# Patient Record
Sex: MALE | Race: WHITE | NOT HISPANIC OR LATINO | Employment: OTHER | URBAN - METROPOLITAN AREA
[De-identification: names, ages, dates, MRNs, and addresses within clinical notes are randomized per-mention and may not be internally consistent; named-entity substitution may affect disease eponyms.]

---

## 2019-02-18 ENCOUNTER — OFFICE VISIT CONVERTED (OUTPATIENT)
Dept: ORTHOPEDIC SURGERY | Facility: CLINIC | Age: 39
End: 2019-02-18
Attending: PHYSICIAN ASSISTANT

## 2019-04-01 ENCOUNTER — OFFICE VISIT CONVERTED (OUTPATIENT)
Dept: NEUROSURGERY | Facility: CLINIC | Age: 39
End: 2019-04-01
Attending: PHYSICIAN ASSISTANT

## 2019-04-01 ENCOUNTER — CONVERSION ENCOUNTER (OUTPATIENT)
Dept: NEUROLOGY | Facility: CLINIC | Age: 39
End: 2019-04-01

## 2019-04-08 ENCOUNTER — HOSPITAL ENCOUNTER (OUTPATIENT)
Dept: CARDIOLOGY | Facility: HOSPITAL | Age: 39
Discharge: HOME | End: 2019-04-08
Attending: PHYSICIAN ASSISTANT

## 2019-08-14 ENCOUNTER — APPOINTMENT (OUTPATIENT)
Dept: GENERAL RADIOLOGY | Facility: HOSPITAL | Age: 39
End: 2019-08-14

## 2019-08-14 ENCOUNTER — HOSPITAL ENCOUNTER (EMERGENCY)
Facility: HOSPITAL | Age: 39
Discharge: HOME | End: 2019-08-14
Admitting: EMERGENCY MEDICINE

## 2019-08-14 VITALS
RESPIRATION RATE: 16 BRPM | OXYGEN SATURATION: 99 % | TEMPERATURE: 98.4 F | BODY MASS INDEX: 25.86 KG/M2 | WEIGHT: 174.6 LBS | DIASTOLIC BLOOD PRESSURE: 102 MMHG | SYSTOLIC BLOOD PRESSURE: 148 MMHG | HEART RATE: 73 BPM | HEIGHT: 69 IN

## 2019-08-14 DIAGNOSIS — T67.5XXA HEAT EXHAUSTION, INITIAL ENCOUNTER: Primary | ICD-10-CM

## 2019-08-14 LAB
ALBUMIN SERPL-MCNC: 4 G/DL (ref 3.5–4.8)
ALBUMIN/GLOB SERPL: 1.1 G/DL (ref 1–1.7)
ALP SERPL-CCNC: 71 U/L (ref 32–91)
ALT SERPL W P-5'-P-CCNC: 30 U/L (ref 17–63)
AMPHET+METHAMPHET UR QL: NEGATIVE
ANION GAP SERPL CALCULATED.3IONS-SCNC: 17.7 MMOL/L (ref 5–15)
AST SERPL-CCNC: 28 U/L (ref 15–41)
BARBITURATES UR QL SCN: NEGATIVE
BASOPHILS # BLD AUTO: 0.1 10*3/MM3 (ref 0–0.2)
BASOPHILS NFR BLD AUTO: 1 % (ref 0–1.5)
BENZODIAZ UR QL SCN: NEGATIVE
BILIRUB SERPL-MCNC: 0.8 MG/DL (ref 0.3–1.2)
BILIRUB UR QL STRIP: NEGATIVE
BUN BLD-MCNC: 7 MG/DL (ref 8–20)
BUN/CREAT SERPL: 7 (ref 6.2–20.3)
CALCIUM SPEC-SCNC: 9.3 MG/DL (ref 8.9–10.3)
CANNABINOIDS SERPL QL: NEGATIVE
CHLORIDE SERPL-SCNC: 105 MMOL/L (ref 101–111)
CK SERPL-CCNC: 94 U/L (ref 20–200)
CLARITY UR: CLEAR
CO2 SERPL-SCNC: 19 MMOL/L (ref 22–32)
COCAINE UR QL: NEGATIVE
COLOR UR: ABNORMAL
CREAT BLD-MCNC: 1 MG/DL (ref 0.7–1.2)
CREAT UR-MCNC: 228.6 MG/DL
DEPRECATED RDW RBC AUTO: 44.2 FL (ref 37–54)
EOSINOPHIL # BLD AUTO: 0.1 10*3/MM3 (ref 0–0.4)
EOSINOPHIL NFR BLD AUTO: 0.8 % (ref 0.3–6.2)
ERYTHROCYTE [DISTWIDTH] IN BLOOD BY AUTOMATED COUNT: 13.9 % (ref 12.3–15.4)
GFR SERPL CREATININE-BSD FRML MDRD: 84 ML/MIN/1.73
GLOBULIN UR ELPH-MCNC: 3.5 GM/DL (ref 2.5–3.8)
GLUCOSE BLD-MCNC: 108 MG/DL (ref 65–99)
GLUCOSE BLDC GLUCOMTR-MCNC: 140 MG/DL (ref 70–105)
GLUCOSE UR STRIP-MCNC: NEGATIVE MG/DL
HCT VFR BLD AUTO: 44 % (ref 37.5–51)
HGB BLD-MCNC: 15.6 G/DL (ref 13–17.7)
HGB UR QL STRIP.AUTO: NEGATIVE
KETONES UR QL STRIP: NEGATIVE
LEUKOCYTE ESTERASE UR QL STRIP.AUTO: NEGATIVE
LYMPHOCYTES # BLD AUTO: 2.9 10*3/MM3 (ref 0.7–3.1)
LYMPHOCYTES NFR BLD AUTO: 24.2 % (ref 19.6–45.3)
MCH RBC QN AUTO: 32.1 PG (ref 26.6–33)
MCHC RBC AUTO-ENTMCNC: 35.4 G/DL (ref 31.5–35.7)
MCV RBC AUTO: 90.5 FL (ref 79–97)
METHADONE UR QL SCN: NEGATIVE
MONOCYTES # BLD AUTO: 0.5 10*3/MM3 (ref 0.1–0.9)
MONOCYTES NFR BLD AUTO: 4 % (ref 5–12)
NEUTROPHILS # BLD AUTO: 8.3 10*3/MM3 (ref 1.7–7)
NEUTROPHILS NFR BLD AUTO: 70 % (ref 42.7–76)
NITRITE UR QL STRIP: NEGATIVE
NRBC BLD AUTO-RTO: 0.1 /100 WBC (ref 0–0.2)
OPIATES UR QL: NEGATIVE
PCP UR QL SCN: NEGATIVE
PH UR STRIP.AUTO: 6.5 [PH] (ref 5–8)
PLAT MORPH BLD: NORMAL
PLATELET # BLD AUTO: 223 10*3/MM3 (ref 140–450)
PMV BLD AUTO: 9.1 FL (ref 6–12)
POTASSIUM BLD-SCNC: 3.7 MMOL/L (ref 3.6–5.1)
PROT SERPL-MCNC: 7.5 G/DL (ref 6.1–7.9)
PROT UR QL STRIP: NEGATIVE
RBC # BLD AUTO: 4.86 10*6/MM3 (ref 4.14–5.8)
RBC MORPH BLD: NORMAL
SODIUM BLD-SCNC: 138 MMOL/L (ref 136–144)
SP GR UR STRIP: 1.03 (ref 1–1.03)
TROPONIN I SERPL-MCNC: <0.03 NG/ML (ref 0–0.03)
UROBILINOGEN UR QL STRIP: ABNORMAL
WBC MORPH BLD: NORMAL
WBC NRBC COR # BLD: 11.9 10*3/MM3 (ref 3.4–10.8)

## 2019-08-14 PROCEDURE — 71045 X-RAY EXAM CHEST 1 VIEW: CPT

## 2019-08-14 PROCEDURE — 80307 DRUG TEST PRSMV CHEM ANLYZR: CPT | Performed by: NURSE PRACTITIONER

## 2019-08-14 PROCEDURE — 99284 EMERGENCY DEPT VISIT MOD MDM: CPT

## 2019-08-14 PROCEDURE — 85007 BL SMEAR W/DIFF WBC COUNT: CPT | Performed by: NURSE PRACTITIONER

## 2019-08-14 PROCEDURE — 93005 ELECTROCARDIOGRAM TRACING: CPT | Performed by: NURSE PRACTITIONER

## 2019-08-14 PROCEDURE — 80053 COMPREHEN METABOLIC PANEL: CPT | Performed by: NURSE PRACTITIONER

## 2019-08-14 PROCEDURE — 82570 ASSAY OF URINE CREATININE: CPT | Performed by: NURSE PRACTITIONER

## 2019-08-14 PROCEDURE — 82550 ASSAY OF CK (CPK): CPT | Performed by: NURSE PRACTITIONER

## 2019-08-14 PROCEDURE — 81003 URINALYSIS AUTO W/O SCOPE: CPT | Performed by: NURSE PRACTITIONER

## 2019-08-14 PROCEDURE — 84484 ASSAY OF TROPONIN QUANT: CPT | Performed by: NURSE PRACTITIONER

## 2019-08-14 PROCEDURE — 82962 GLUCOSE BLOOD TEST: CPT

## 2019-08-14 PROCEDURE — 85025 COMPLETE CBC W/AUTO DIFF WBC: CPT | Performed by: NURSE PRACTITIONER

## 2019-08-14 RX ORDER — CLONIDINE HYDROCHLORIDE 0.1 MG/1
TABLET ORAL
Status: COMPLETED
Start: 2019-08-14 | End: 2019-08-14

## 2019-08-14 RX ORDER — CLONIDINE HYDROCHLORIDE 0.1 MG/1
0.2 TABLET ORAL ONCE
Status: COMPLETED | OUTPATIENT
Start: 2019-08-14 | End: 2019-08-14

## 2019-08-14 RX ADMIN — CLONIDINE HYDROCHLORIDE 0.2 MG: 0.1 TABLET ORAL at 20:21

## 2019-08-14 RX ADMIN — SODIUM CHLORIDE 1000 ML: 900 INJECTION, SOLUTION INTRAVENOUS at 18:16

## 2019-08-14 NOTE — ED PROVIDER NOTES
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  He had stopped and was walking to a store to get something to drink when he suddenly became very lightheaded and dizzy and he was having trouble breathing.            Review of Systems   Constitutional: Positive for diaphoresis.   Respiratory: Positive for shortness of breath.    Cardiovascular: Negative for chest pain.   Gastrointestinal: Negative for abdominal pain.   Neurological: Positive for speech difficulty and light-headedness.       History reviewed. No pertinent past medical history.    No Known Allergies    History reviewed. No pertinent surgical history.    History reviewed. No pertinent family history.    Social History     Socioeconomic History   • Marital status:      Spouse name: Not on file   • Number of children: Not on file   • Years of education: Not on file   • Highest education level: Not on file   Tobacco Use   • Smoking status: Current Every Day Smoker     Packs/day: 1.00   Substance and Sexual Activity   • Alcohol use: No     Frequency: Never   • Drug use: No           Objective   Physical Exam  38-year-old gentleman sitting up in the bed very pale but awake and alert and sweat soaked close.  On examination pupils are equal and reactive with good EOM the pharynx is clear airway patent mouth is moist neck is supple sounds clear and equal bilaterally heart sounds S1-S2 no murmur abdomen soft nontender skin has no rash or lesions neurologically patient's awake alert oriented x4 no focal deficits  Procedures           ED Course      Results for orders placed or performed during the hospital encounter of 08/14/19   Comprehensive Metabolic Panel   Result Value Ref Range    Glucose 108 (H) 65 - 99 mg/dL    BUN 7 (L) 8 - 20 mg/dL    Creatinine 1.00 0.70 - 1.20 mg/dL    Sodium 138 136 - 144 mmol/L    Potassium 3.7 3.6 - 5.1 mmol/L     Chloride 105 101 - 111 mmol/L    CO2 19.0 (L) 22.0 - 32.0 mmol/L    Calcium 9.3 8.9 - 10.3 mg/dL    Total Protein 7.5 6.1 - 7.9 g/dL    Albumin 4.00 3.50 - 4.80 g/dL    ALT (SGPT) 30 17 - 63 U/L    AST (SGOT) 28 15 - 41 U/L    Alkaline Phosphatase 71 32 - 91 U/L    Total Bilirubin 0.8 0.3 - 1.2 mg/dL    eGFR Non African Amer 84 >60 mL/min/1.73    Globulin 3.5 2.5 - 3.8 gm/dL    A/G Ratio 1.1 1.0 - 1.7 g/dL    BUN/Creatinine Ratio 7.0 6.2 - 20.3    Anion Gap 17.7 (H) 5.0 - 15.0 mmol/L   Urine Drug Screen - Urine, Random Void   Result Value Ref Range    Barbiturates Screen, Urine Negative Negative    Benzodiazepine Screen, Urine Negative Negative    Cocaine Screen, Urine Negative Negative    Opiate Screen Negative Negative    THC, Screen, Urine Negative Negative    Methadone Screen, Urine Negative Negative    Amphetamine Screen, Urine Negative Negative    Creatinine, Urine 228.6 mg/dL    Phencyclidine (PCP), Urine Negative Negative   Urinalysis With Culture If Indicated - Urine, Random Void   Result Value Ref Range    Color, UA Dark Yellow (A) Yellow, Straw    Appearance, UA Clear Clear    pH, UA 6.5 5.0 - 8.0    Specific Gravity, UA 1.027 1.005 - 1.030    Glucose, UA Negative Negative    Ketones, UA Negative Negative    Bilirubin, UA Negative Negative    Blood, UA Negative Negative    Protein, UA Negative Negative    Leuk Esterase, UA Negative Negative    Nitrite, UA Negative Negative    Urobilinogen, UA 0.2 E.U./dL 0.2 - 1.0 E.U./dL   CBC Auto Differential   Result Value Ref Range    WBC 11.90 (H) 3.40 - 10.80 10*3/mm3    RBC 4.86 4.14 - 5.80 10*6/mm3    Hemoglobin 15.6 13.0 - 17.7 g/dL    Hematocrit 44.0 37.5 - 51.0 %    MCV 90.5 79.0 - 97.0 fL    MCH 32.1 26.6 - 33.0 pg    MCHC 35.4 31.5 - 35.7 g/dL    RDW 13.9 12.3 - 15.4 %    RDW-SD 44.2 37.0 - 54.0 fl    MPV 9.1 6.0 - 12.0 fL    Platelets 223 140 - 450 10*3/mm3    Neutrophil % 70.0 42.7 - 76.0 %    Lymphocyte % 24.2 19.6 - 45.3 %    Monocyte % 4.0 (L) 5.0 -  "12.0 %    Eosinophil % 0.8 0.3 - 6.2 %    Basophil % 1.0 0.0 - 1.5 %    Neutrophils, Absolute 8.30 (H) 1.70 - 7.00 10*3/mm3    Lymphocytes, Absolute 2.90 0.70 - 3.10 10*3/mm3    Monocytes, Absolute 0.50 0.10 - 0.90 10*3/mm3    Eosinophils, Absolute 0.10 0.00 - 0.40 10*3/mm3    Basophils, Absolute 0.10 0.00 - 0.20 10*3/mm3    nRBC 0.1 0.0 - 0.2 /100 WBC   CK   Result Value Ref Range    Creatine Kinase 94 20 - 200 U/L   Troponin   Result Value Ref Range    Troponin I <0.030 0.000 - 0.030 ng/mL   Scan Slide   Result Value Ref Range    RBC Morphology Normal Normal    WBC Morphology Normal Normal    Platelet Morphology Normal Normal   POC Glucose Once   Result Value Ref Range    Glucose 140 (H) 70 - 105 mg/dL     Xr Chest 1 View    Result Date: 8/14/2019  No radiographic findings of acute cardiopulmonary abnormality.  Electronically Signed By-DR. Lavelle Medeiros MD On:8/14/2019 6:51 PM This report was finalized on [id number] by DR. Lavelle Medeiros MD.    On reexamination patient is drinking p.o. fluids without difficulty after 2 L of fluid he states that he feels \"much better\".  He ambulated around the ER without difficulty.  Discussed with Dr. oCndon.  Patient be discharged home and encouraged to avoid heat for the next day or so drink plenty of fluids              MDM      Final diagnoses:   Heat exhaustion, initial encounter            Guido Medina NP  08/14/19 2018    "

## 2021-03-23 ENCOUNTER — HOSPITAL ENCOUNTER (OUTPATIENT)
Dept: CARDIOLOGY | Facility: HOSPITAL | Age: 41
Discharge: HOME | End: 2021-03-23
Attending: SURGERY

## 2021-04-09 ENCOUNTER — HOSPITAL ENCOUNTER (OUTPATIENT)
Dept: PREADMISSION TESTING | Facility: HOSPITAL | Age: 41
Discharge: HOME | End: 2021-04-09
Attending: SURGERY

## 2021-04-10 LAB — SARS-COV-2 RNA SPEC QL NAA+PROBE: NOT DETECTED

## 2021-04-29 ENCOUNTER — HOSPITAL ENCOUNTER (OUTPATIENT)
Dept: CARDIOLOGY | Facility: HOSPITAL | Age: 41
Discharge: HOME | End: 2021-04-29
Attending: SURGERY

## 2021-05-15 VITALS — HEIGHT: 71 IN | HEART RATE: 76 BPM | WEIGHT: 177 LBS | BODY MASS INDEX: 24.78 KG/M2

## 2021-05-15 VITALS — WEIGHT: 170 LBS | HEART RATE: 79 BPM | BODY MASS INDEX: 23.8 KG/M2 | HEIGHT: 71 IN | OXYGEN SATURATION: 99 %

## 2021-06-01 ENCOUNTER — HOSPITAL ENCOUNTER (OUTPATIENT)
Dept: CARDIOLOGY | Facility: HOSPITAL | Age: 41
Discharge: HOME | End: 2021-06-01
Attending: SURGERY

## 2021-11-16 ENCOUNTER — HOSPITAL ENCOUNTER (INPATIENT)
Facility: HOSPITAL | Age: 41
LOS: 12 days | Discharge: HOME | End: 2021-11-28
Attending: EMERGENCY MEDICINE

## 2021-11-16 ENCOUNTER — APPOINTMENT (OUTPATIENT)
Dept: CT IMAGING | Facility: HOSPITAL | Age: 41
End: 2021-11-16

## 2021-11-16 DIAGNOSIS — Z98.890 S/P EXPLORATORY LAPAROTOMY: ICD-10-CM

## 2021-11-16 DIAGNOSIS — K56.609 SMALL BOWEL OBSTRUCTION (HCC): Primary | ICD-10-CM

## 2021-11-16 DIAGNOSIS — K56.609 SBO (SMALL BOWEL OBSTRUCTION) (HCC): ICD-10-CM

## 2021-11-16 DIAGNOSIS — Z90.49 S/P APPENDECTOMY: ICD-10-CM

## 2021-11-16 LAB
ALBUMIN SERPL-MCNC: 5.1 G/DL (ref 3.5–5.2)
ALBUMIN/GLOB SERPL: 1.4 G/DL
ALP SERPL-CCNC: 97 U/L (ref 39–117)
ALT SERPL W P-5'-P-CCNC: 46 U/L (ref 1–41)
ANION GAP SERPL CALCULATED.3IONS-SCNC: 15.6 MMOL/L (ref 5–15)
AST SERPL-CCNC: 39 U/L (ref 1–40)
BASOPHILS # BLD AUTO: 0.06 10*3/MM3 (ref 0–0.2)
BASOPHILS NFR BLD AUTO: 0.3 % (ref 0–1.5)
BILIRUB SERPL-MCNC: 0.3 MG/DL (ref 0–1.2)
BILIRUB UR QL STRIP: NEGATIVE
BUN SERPL-MCNC: 11 MG/DL (ref 6–20)
BUN/CREAT SERPL: 11.5 (ref 7–25)
CALCIUM SPEC-SCNC: 10.1 MG/DL (ref 8.6–10.5)
CHLORIDE SERPL-SCNC: 103 MMOL/L (ref 98–107)
CLARITY UR: CLEAR
CO2 SERPL-SCNC: 23.4 MMOL/L (ref 22–29)
COLOR UR: YELLOW
CREAT SERPL-MCNC: 0.96 MG/DL (ref 0.76–1.27)
DEPRECATED RDW RBC AUTO: 48.7 FL (ref 37–54)
EOSINOPHIL # BLD AUTO: 0.04 10*3/MM3 (ref 0–0.4)
EOSINOPHIL NFR BLD AUTO: 0.2 % (ref 0.3–6.2)
ERYTHROCYTE [DISTWIDTH] IN BLOOD BY AUTOMATED COUNT: 14.3 % (ref 12.3–15.4)
GFR SERPL CREATININE-BSD FRML MDRD: 86 ML/MIN/1.73
GLOBULIN UR ELPH-MCNC: 3.6 GM/DL
GLUCOSE SERPL-MCNC: 158 MG/DL (ref 65–99)
GLUCOSE UR STRIP-MCNC: ABNORMAL MG/DL
HCT VFR BLD AUTO: 49.1 % (ref 37.5–51)
HGB BLD-MCNC: 15.8 G/DL (ref 13–17.7)
HGB UR QL STRIP.AUTO: NEGATIVE
HOLD SPECIMEN: NORMAL
HOLD SPECIMEN: NORMAL
IMM GRANULOCYTES # BLD AUTO: 0.11 10*3/MM3 (ref 0–0.05)
IMM GRANULOCYTES NFR BLD AUTO: 0.6 % (ref 0–0.5)
KETONES UR QL STRIP: NEGATIVE
LEUKOCYTE ESTERASE UR QL STRIP.AUTO: NEGATIVE
LIPASE SERPL-CCNC: 19 U/L (ref 13–60)
LYMPHOCYTES # BLD AUTO: 1.73 10*3/MM3 (ref 0.7–3.1)
LYMPHOCYTES NFR BLD AUTO: 10 % (ref 19.6–45.3)
MCH RBC QN AUTO: 29.6 PG (ref 26.6–33)
MCHC RBC AUTO-ENTMCNC: 32.2 G/DL (ref 31.5–35.7)
MCV RBC AUTO: 92.1 FL (ref 79–97)
MONOCYTES # BLD AUTO: 0.35 10*3/MM3 (ref 0.1–0.9)
MONOCYTES NFR BLD AUTO: 2 % (ref 5–12)
NEUTROPHILS NFR BLD AUTO: 15.09 10*3/MM3 (ref 1.7–7)
NEUTROPHILS NFR BLD AUTO: 86.9 % (ref 42.7–76)
NITRITE UR QL STRIP: NEGATIVE
NRBC BLD AUTO-RTO: 0 /100 WBC (ref 0–0.2)
PH UR STRIP.AUTO: 8 [PH] (ref 5–8)
PLATELET # BLD AUTO: 243 10*3/MM3 (ref 140–450)
PMV BLD AUTO: 10.3 FL (ref 6–12)
POTASSIUM SERPL-SCNC: 3.9 MMOL/L (ref 3.5–5.2)
PROT SERPL-MCNC: 8.7 G/DL (ref 6–8.5)
PROT UR QL STRIP: ABNORMAL
RBC # BLD AUTO: 5.33 10*6/MM3 (ref 4.14–5.8)
SODIUM SERPL-SCNC: 142 MMOL/L (ref 136–145)
SP GR UR STRIP: >1.03 (ref 1–1.03)
UROBILINOGEN UR QL STRIP: ABNORMAL
WBC # BLD AUTO: 17.38 10*3/MM3 (ref 3.4–10.8)
WHOLE BLOOD HOLD SPECIMEN: NORMAL
WHOLE BLOOD HOLD SPECIMEN: NORMAL

## 2021-11-16 PROCEDURE — 80053 COMPREHEN METABOLIC PANEL: CPT | Performed by: EMERGENCY MEDICINE

## 2021-11-16 PROCEDURE — 36415 COLL VENOUS BLD VENIPUNCTURE: CPT

## 2021-11-16 PROCEDURE — 74177 CT ABD & PELVIS W/CONTRAST: CPT

## 2021-11-16 PROCEDURE — 85025 COMPLETE CBC W/AUTO DIFF WBC: CPT | Performed by: EMERGENCY MEDICINE

## 2021-11-16 PROCEDURE — 99283 EMERGENCY DEPT VISIT LOW MDM: CPT

## 2021-11-16 PROCEDURE — 0 IOPAMIDOL PER 1 ML: Performed by: EMERGENCY MEDICINE

## 2021-11-16 PROCEDURE — 83690 ASSAY OF LIPASE: CPT | Performed by: EMERGENCY MEDICINE

## 2021-11-16 PROCEDURE — 25010000002 ONDANSETRON PER 1 MG: Performed by: EMERGENCY MEDICINE

## 2021-11-16 PROCEDURE — 25010000002 HYDROMORPHONE 1 MG/ML SOLUTION: Performed by: EMERGENCY MEDICINE

## 2021-11-16 PROCEDURE — 99222 1ST HOSP IP/OBS MODERATE 55: CPT | Performed by: HOSPITALIST

## 2021-11-16 PROCEDURE — 81003 URINALYSIS AUTO W/O SCOPE: CPT | Performed by: EMERGENCY MEDICINE

## 2021-11-16 PROCEDURE — 99284 EMERGENCY DEPT VISIT MOD MDM: CPT

## 2021-11-16 RX ORDER — ASPIRIN 81 MG/1
81 TABLET ORAL NIGHTLY
COMMUNITY

## 2021-11-16 RX ORDER — SODIUM CHLORIDE 0.9 % (FLUSH) 0.9 %
10 SYRINGE (ML) INJECTION AS NEEDED
Status: DISCONTINUED | OUTPATIENT
Start: 2021-11-16 | End: 2021-11-28 | Stop reason: HOSPADM

## 2021-11-16 RX ORDER — ONDANSETRON 2 MG/ML
4 INJECTION INTRAMUSCULAR; INTRAVENOUS ONCE
Status: COMPLETED | OUTPATIENT
Start: 2021-11-16 | End: 2021-11-16

## 2021-11-16 RX ORDER — ROSUVASTATIN CALCIUM 10 MG/1
10 TABLET, COATED ORAL NIGHTLY
COMMUNITY

## 2021-11-16 RX ADMIN — SODIUM CHLORIDE 1000 ML: 9 INJECTION, SOLUTION INTRAVENOUS at 21:23

## 2021-11-16 RX ADMIN — IOPAMIDOL 100 ML: 755 INJECTION, SOLUTION INTRAVENOUS at 21:57

## 2021-11-16 RX ADMIN — HYDROMORPHONE HYDROCHLORIDE 1 MG: 1 INJECTION, SOLUTION INTRAMUSCULAR; INTRAVENOUS; SUBCUTANEOUS at 23:55

## 2021-11-16 RX ADMIN — HYDROMORPHONE HYDROCHLORIDE 1 MG: 1 INJECTION, SOLUTION INTRAMUSCULAR; INTRAVENOUS; SUBCUTANEOUS at 21:23

## 2021-11-16 RX ADMIN — ONDANSETRON 4 MG: 2 INJECTION INTRAMUSCULAR; INTRAVENOUS at 21:23

## 2021-11-17 ENCOUNTER — APPOINTMENT (OUTPATIENT)
Dept: GENERAL RADIOLOGY | Facility: HOSPITAL | Age: 41
End: 2021-11-17

## 2021-11-17 LAB
ANION GAP SERPL CALCULATED.3IONS-SCNC: 11.8 MMOL/L (ref 5–15)
BASOPHILS # BLD AUTO: 0.03 10*3/MM3 (ref 0–0.2)
BASOPHILS NFR BLD AUTO: 0.2 % (ref 0–1.5)
BUN SERPL-MCNC: 9 MG/DL (ref 6–20)
BUN/CREAT SERPL: 10.5 (ref 7–25)
CALCIUM SPEC-SCNC: 9.4 MG/DL (ref 8.6–10.5)
CHLORIDE SERPL-SCNC: 105 MMOL/L (ref 98–107)
CO2 SERPL-SCNC: 22.2 MMOL/L (ref 22–29)
CREAT SERPL-MCNC: 0.86 MG/DL (ref 0.76–1.27)
DEPRECATED RDW RBC AUTO: 48.1 FL (ref 37–54)
EOSINOPHIL # BLD AUTO: 0 10*3/MM3 (ref 0–0.4)
EOSINOPHIL NFR BLD AUTO: 0 % (ref 0.3–6.2)
ERYTHROCYTE [DISTWIDTH] IN BLOOD BY AUTOMATED COUNT: 14.3 % (ref 12.3–15.4)
GFR SERPL CREATININE-BSD FRML MDRD: 98 ML/MIN/1.73
GLUCOSE SERPL-MCNC: 140 MG/DL (ref 65–99)
HCT VFR BLD AUTO: 49.1 % (ref 37.5–51)
HGB BLD-MCNC: 16.2 G/DL (ref 13–17.7)
IMM GRANULOCYTES # BLD AUTO: 0.09 10*3/MM3 (ref 0–0.05)
IMM GRANULOCYTES NFR BLD AUTO: 0.6 % (ref 0–0.5)
LYMPHOCYTES # BLD AUTO: 1.65 10*3/MM3 (ref 0.7–3.1)
LYMPHOCYTES NFR BLD AUTO: 10.3 % (ref 19.6–45.3)
MAGNESIUM SERPL-MCNC: 2.4 MG/DL (ref 1.6–2.6)
MCH RBC QN AUTO: 30.1 PG (ref 26.6–33)
MCHC RBC AUTO-ENTMCNC: 33 G/DL (ref 31.5–35.7)
MCV RBC AUTO: 91.1 FL (ref 79–97)
MONOCYTES # BLD AUTO: 0.48 10*3/MM3 (ref 0.1–0.9)
MONOCYTES NFR BLD AUTO: 3 % (ref 5–12)
NEUTROPHILS NFR BLD AUTO: 13.73 10*3/MM3 (ref 1.7–7)
NEUTROPHILS NFR BLD AUTO: 85.9 % (ref 42.7–76)
NRBC BLD AUTO-RTO: 0 /100 WBC (ref 0–0.2)
PLATELET # BLD AUTO: 228 10*3/MM3 (ref 140–450)
PMV BLD AUTO: 10.3 FL (ref 6–12)
POTASSIUM SERPL-SCNC: 4.8 MMOL/L (ref 3.5–5.2)
RBC # BLD AUTO: 5.39 10*6/MM3 (ref 4.14–5.8)
SODIUM SERPL-SCNC: 139 MMOL/L (ref 136–145)
WBC # BLD AUTO: 15.98 10*3/MM3 (ref 3.4–10.8)

## 2021-11-17 PROCEDURE — 99233 SBSQ HOSP IP/OBS HIGH 50: CPT | Performed by: INTERNAL MEDICINE

## 2021-11-17 PROCEDURE — 25010000002 MORPHINE PER 10 MG: Performed by: NURSE PRACTITIONER

## 2021-11-17 PROCEDURE — 74018 RADEX ABDOMEN 1 VIEW: CPT

## 2021-11-17 PROCEDURE — 99252 IP/OBS CONSLTJ NEW/EST SF 35: CPT | Performed by: NURSE PRACTITIONER

## 2021-11-17 PROCEDURE — 85025 COMPLETE CBC W/AUTO DIFF WBC: CPT | Performed by: PHYSICIAN ASSISTANT

## 2021-11-17 PROCEDURE — 25010000002 MORPHINE PER 10 MG: Performed by: PHYSICIAN ASSISTANT

## 2021-11-17 PROCEDURE — 36415 COLL VENOUS BLD VENIPUNCTURE: CPT | Performed by: PHYSICIAN ASSISTANT

## 2021-11-17 PROCEDURE — 25010000002 ONDANSETRON PER 1 MG: Performed by: PHYSICIAN ASSISTANT

## 2021-11-17 PROCEDURE — 83735 ASSAY OF MAGNESIUM: CPT | Performed by: PHYSICIAN ASSISTANT

## 2021-11-17 PROCEDURE — 80048 BASIC METABOLIC PNL TOTAL CA: CPT | Performed by: PHYSICIAN ASSISTANT

## 2021-11-17 PROCEDURE — 74019 RADEX ABDOMEN 2 VIEWS: CPT

## 2021-11-17 RX ORDER — ALUMINA, MAGNESIA, AND SIMETHICONE 2400; 2400; 240 MG/30ML; MG/30ML; MG/30ML
15 SUSPENSION ORAL EVERY 6 HOURS PRN
Status: DISCONTINUED | OUTPATIENT
Start: 2021-11-17 | End: 2021-11-28 | Stop reason: HOSPADM

## 2021-11-17 RX ORDER — VALSARTAN 160 MG/1
160 TABLET ORAL NIGHTLY
COMMUNITY

## 2021-11-17 RX ORDER — MORPHINE SULFATE 2 MG/ML
INJECTION, SOLUTION INTRAMUSCULAR; INTRAVENOUS
Status: DISPENSED
Start: 2021-11-17 | End: 2021-11-17

## 2021-11-17 RX ORDER — SODIUM CHLORIDE 0.9 % (FLUSH) 0.9 %
10 SYRINGE (ML) INJECTION AS NEEDED
Status: DISCONTINUED | OUTPATIENT
Start: 2021-11-17 | End: 2021-11-28 | Stop reason: HOSPADM

## 2021-11-17 RX ORDER — LABETALOL HYDROCHLORIDE 5 MG/ML
10 INJECTION, SOLUTION INTRAVENOUS EVERY 4 HOURS PRN
Status: DISCONTINUED | OUTPATIENT
Start: 2021-11-17 | End: 2021-11-28 | Stop reason: HOSPADM

## 2021-11-17 RX ORDER — MORPHINE SULFATE 2 MG/ML
2 INJECTION, SOLUTION INTRAMUSCULAR; INTRAVENOUS
Status: ACTIVE | OUTPATIENT
Start: 2021-11-17 | End: 2021-11-24

## 2021-11-17 RX ORDER — FAMOTIDINE 10 MG/ML
20 INJECTION, SOLUTION INTRAVENOUS EVERY 12 HOURS SCHEDULED
Status: DISCONTINUED | OUTPATIENT
Start: 2021-11-17 | End: 2021-11-25

## 2021-11-17 RX ORDER — SODIUM CHLORIDE 0.9 % (FLUSH) 0.9 %
10 SYRINGE (ML) INJECTION EVERY 12 HOURS SCHEDULED
Status: DISCONTINUED | OUTPATIENT
Start: 2021-11-17 | End: 2021-11-28 | Stop reason: HOSPADM

## 2021-11-17 RX ORDER — MORPHINE SULFATE 2 MG/ML
2 INJECTION, SOLUTION INTRAMUSCULAR; INTRAVENOUS EVERY 4 HOURS PRN
Status: DISCONTINUED | OUTPATIENT
Start: 2021-11-17 | End: 2021-11-17

## 2021-11-17 RX ORDER — SODIUM CHLORIDE, SODIUM LACTATE, POTASSIUM CHLORIDE, CALCIUM CHLORIDE 600; 310; 30; 20 MG/100ML; MG/100ML; MG/100ML; MG/100ML
150 INJECTION, SOLUTION INTRAVENOUS CONTINUOUS
Status: DISCONTINUED | OUTPATIENT
Start: 2021-11-17 | End: 2021-11-25

## 2021-11-17 RX ORDER — ONDANSETRON 2 MG/ML
4 INJECTION INTRAMUSCULAR; INTRAVENOUS EVERY 4 HOURS PRN
Status: DISCONTINUED | OUTPATIENT
Start: 2021-11-17 | End: 2021-11-28 | Stop reason: HOSPADM

## 2021-11-17 RX ORDER — MORPHINE SULFATE 2 MG/ML
2 INJECTION, SOLUTION INTRAMUSCULAR; INTRAVENOUS
Status: DISCONTINUED | OUTPATIENT
Start: 2021-11-17 | End: 2021-11-17

## 2021-11-17 RX ORDER — CHOLECALCIFEROL (VITAMIN D3) 125 MCG
5 CAPSULE ORAL NIGHTLY PRN
Status: DISCONTINUED | OUTPATIENT
Start: 2021-11-17 | End: 2021-11-28 | Stop reason: HOSPADM

## 2021-11-17 RX ADMIN — SODIUM CHLORIDE, POTASSIUM CHLORIDE, SODIUM LACTATE AND CALCIUM CHLORIDE 100 ML/HR: 600; 310; 30; 20 INJECTION, SOLUTION INTRAVENOUS at 20:10

## 2021-11-17 RX ADMIN — MORPHINE SULFATE 2 MG: 2 INJECTION, SOLUTION INTRAMUSCULAR; INTRAVENOUS at 07:34

## 2021-11-17 RX ADMIN — SODIUM CHLORIDE, POTASSIUM CHLORIDE, SODIUM LACTATE AND CALCIUM CHLORIDE 100 ML/HR: 600; 310; 30; 20 INJECTION, SOLUTION INTRAVENOUS at 05:10

## 2021-11-17 RX ADMIN — MORPHINE SULFATE 4 MG: 4 INJECTION INTRAVENOUS at 13:51

## 2021-11-17 RX ADMIN — MORPHINE SULFATE 4 MG: 4 INJECTION INTRAVENOUS at 20:40

## 2021-11-17 RX ADMIN — MORPHINE SULFATE 2 MG: 2 INJECTION, SOLUTION INTRAMUSCULAR; INTRAVENOUS at 03:35

## 2021-11-17 RX ADMIN — SODIUM CHLORIDE, PRESERVATIVE FREE 10 ML: 5 INJECTION INTRAVENOUS at 09:54

## 2021-11-17 RX ADMIN — ONDANSETRON 4 MG: 2 INJECTION INTRAMUSCULAR; INTRAVENOUS at 18:34

## 2021-11-17 RX ADMIN — SODIUM CHLORIDE, POTASSIUM CHLORIDE, SODIUM LACTATE AND CALCIUM CHLORIDE 100 ML/HR: 600; 310; 30; 20 INJECTION, SOLUTION INTRAVENOUS at 09:53

## 2021-11-17 RX ADMIN — SODIUM CHLORIDE, PRESERVATIVE FREE 10 ML: 5 INJECTION INTRAVENOUS at 20:10

## 2021-11-17 RX ADMIN — FAMOTIDINE 20 MG: 10 INJECTION INTRAVENOUS at 18:17

## 2021-11-17 RX ADMIN — MORPHINE SULFATE 4 MG: 4 INJECTION INTRAVENOUS at 10:50

## 2021-11-18 ENCOUNTER — APPOINTMENT (OUTPATIENT)
Dept: GENERAL RADIOLOGY | Facility: HOSPITAL | Age: 41
End: 2021-11-18

## 2021-11-18 LAB
ANION GAP SERPL CALCULATED.3IONS-SCNC: 8.8 MMOL/L (ref 5–15)
BASOPHILS # BLD AUTO: 0.03 10*3/MM3 (ref 0–0.2)
BASOPHILS NFR BLD AUTO: 0.2 % (ref 0–1.5)
BUN SERPL-MCNC: 10 MG/DL (ref 6–20)
BUN/CREAT SERPL: 11.5 (ref 7–25)
CALCIUM SPEC-SCNC: 9.2 MG/DL (ref 8.6–10.5)
CHLORIDE SERPL-SCNC: 105 MMOL/L (ref 98–107)
CO2 SERPL-SCNC: 26.2 MMOL/L (ref 22–29)
CREAT SERPL-MCNC: 0.87 MG/DL (ref 0.76–1.27)
DEPRECATED RDW RBC AUTO: 47.5 FL (ref 37–54)
EOSINOPHIL # BLD AUTO: 0.01 10*3/MM3 (ref 0–0.4)
EOSINOPHIL NFR BLD AUTO: 0.1 % (ref 0.3–6.2)
ERYTHROCYTE [DISTWIDTH] IN BLOOD BY AUTOMATED COUNT: 14.2 % (ref 12.3–15.4)
GFR SERPL CREATININE-BSD FRML MDRD: 97 ML/MIN/1.73
GLUCOSE SERPL-MCNC: 111 MG/DL (ref 65–99)
HCT VFR BLD AUTO: 45.9 % (ref 37.5–51)
HGB BLD-MCNC: 15.1 G/DL (ref 13–17.7)
IMM GRANULOCYTES # BLD AUTO: 0.03 10*3/MM3 (ref 0–0.05)
IMM GRANULOCYTES NFR BLD AUTO: 0.2 % (ref 0–0.5)
LYMPHOCYTES # BLD AUTO: 3.01 10*3/MM3 (ref 0.7–3.1)
LYMPHOCYTES NFR BLD AUTO: 24.4 % (ref 19.6–45.3)
MAGNESIUM SERPL-MCNC: 2.3 MG/DL (ref 1.6–2.6)
MCH RBC QN AUTO: 29.7 PG (ref 26.6–33)
MCHC RBC AUTO-ENTMCNC: 32.9 G/DL (ref 31.5–35.7)
MCV RBC AUTO: 90.4 FL (ref 79–97)
MONOCYTES # BLD AUTO: 0.73 10*3/MM3 (ref 0.1–0.9)
MONOCYTES NFR BLD AUTO: 5.9 % (ref 5–12)
NEUTROPHILS NFR BLD AUTO: 69.2 % (ref 42.7–76)
NEUTROPHILS NFR BLD AUTO: 8.55 10*3/MM3 (ref 1.7–7)
NRBC BLD AUTO-RTO: 0 /100 WBC (ref 0–0.2)
PHOSPHATE SERPL-MCNC: 3.2 MG/DL (ref 2.5–4.5)
PLATELET # BLD AUTO: 226 10*3/MM3 (ref 140–450)
PMV BLD AUTO: 10.3 FL (ref 6–12)
POTASSIUM SERPL-SCNC: 4.6 MMOL/L (ref 3.5–5.2)
RBC # BLD AUTO: 5.08 10*6/MM3 (ref 4.14–5.8)
SODIUM SERPL-SCNC: 140 MMOL/L (ref 136–145)
WBC NRBC COR # BLD: 12.36 10*3/MM3 (ref 3.4–10.8)

## 2021-11-18 PROCEDURE — 25010000002 MORPHINE PER 10 MG: Performed by: NURSE PRACTITIONER

## 2021-11-18 PROCEDURE — 83735 ASSAY OF MAGNESIUM: CPT | Performed by: INTERNAL MEDICINE

## 2021-11-18 PROCEDURE — 99233 SBSQ HOSP IP/OBS HIGH 50: CPT | Performed by: INTERNAL MEDICINE

## 2021-11-18 PROCEDURE — 85025 COMPLETE CBC W/AUTO DIFF WBC: CPT | Performed by: INTERNAL MEDICINE

## 2021-11-18 PROCEDURE — 80048 BASIC METABOLIC PNL TOTAL CA: CPT | Performed by: INTERNAL MEDICINE

## 2021-11-18 PROCEDURE — 99231 SBSQ HOSP IP/OBS SF/LOW 25: CPT | Performed by: NURSE PRACTITIONER

## 2021-11-18 PROCEDURE — 74019 RADEX ABDOMEN 2 VIEWS: CPT

## 2021-11-18 PROCEDURE — 84100 ASSAY OF PHOSPHORUS: CPT | Performed by: SURGERY

## 2021-11-18 RX ADMIN — SODIUM CHLORIDE, PRESERVATIVE FREE 10 ML: 5 INJECTION INTRAVENOUS at 20:18

## 2021-11-18 RX ADMIN — MORPHINE SULFATE 4 MG: 4 INJECTION INTRAVENOUS at 16:04

## 2021-11-18 RX ADMIN — MORPHINE SULFATE 4 MG: 4 INJECTION INTRAVENOUS at 18:30

## 2021-11-18 RX ADMIN — MORPHINE SULFATE 4 MG: 4 INJECTION INTRAVENOUS at 12:39

## 2021-11-18 RX ADMIN — MORPHINE SULFATE 4 MG: 4 INJECTION INTRAVENOUS at 04:52

## 2021-11-18 RX ADMIN — SODIUM CHLORIDE, POTASSIUM CHLORIDE, SODIUM LACTATE AND CALCIUM CHLORIDE 100 ML/HR: 600; 310; 30; 20 INJECTION, SOLUTION INTRAVENOUS at 16:54

## 2021-11-18 RX ADMIN — SODIUM CHLORIDE, POTASSIUM CHLORIDE, SODIUM LACTATE AND CALCIUM CHLORIDE 100 ML/HR: 600; 310; 30; 20 INJECTION, SOLUTION INTRAVENOUS at 05:57

## 2021-11-18 RX ADMIN — MORPHINE SULFATE 4 MG: 4 INJECTION INTRAVENOUS at 09:32

## 2021-11-18 RX ADMIN — MORPHINE SULFATE 4 MG: 4 INJECTION INTRAVENOUS at 00:52

## 2021-11-18 RX ADMIN — MORPHINE SULFATE 4 MG: 4 INJECTION INTRAVENOUS at 20:50

## 2021-11-19 ENCOUNTER — APPOINTMENT (OUTPATIENT)
Dept: GENERAL RADIOLOGY | Facility: HOSPITAL | Age: 41
End: 2021-11-19

## 2021-11-19 LAB
ANION GAP SERPL CALCULATED.3IONS-SCNC: 10.9 MMOL/L (ref 5–15)
BASOPHILS # BLD AUTO: 0.03 10*3/MM3 (ref 0–0.2)
BASOPHILS NFR BLD AUTO: 0.2 % (ref 0–1.5)
BUN SERPL-MCNC: 14 MG/DL (ref 6–20)
BUN/CREAT SERPL: 16.1 (ref 7–25)
CALCIUM SPEC-SCNC: 9.2 MG/DL (ref 8.6–10.5)
CHLORIDE SERPL-SCNC: 100 MMOL/L (ref 98–107)
CO2 SERPL-SCNC: 30.1 MMOL/L (ref 22–29)
CREAT SERPL-MCNC: 0.87 MG/DL (ref 0.76–1.27)
DEPRECATED RDW RBC AUTO: 47.5 FL (ref 37–54)
EOSINOPHIL # BLD AUTO: 0.02 10*3/MM3 (ref 0–0.4)
EOSINOPHIL NFR BLD AUTO: 0.2 % (ref 0.3–6.2)
ERYTHROCYTE [DISTWIDTH] IN BLOOD BY AUTOMATED COUNT: 14 % (ref 12.3–15.4)
GFR SERPL CREATININE-BSD FRML MDRD: 97 ML/MIN/1.73
GLUCOSE SERPL-MCNC: 121 MG/DL (ref 65–99)
HCT VFR BLD AUTO: 46.1 % (ref 37.5–51)
HGB BLD-MCNC: 14.9 G/DL (ref 13–17.7)
IMM GRANULOCYTES # BLD AUTO: 0.04 10*3/MM3 (ref 0–0.05)
IMM GRANULOCYTES NFR BLD AUTO: 0.3 % (ref 0–0.5)
LYMPHOCYTES # BLD AUTO: 2.13 10*3/MM3 (ref 0.7–3.1)
LYMPHOCYTES NFR BLD AUTO: 16.3 % (ref 19.6–45.3)
MAGNESIUM SERPL-MCNC: 2.3 MG/DL (ref 1.6–2.6)
MCH RBC QN AUTO: 30 PG (ref 26.6–33)
MCHC RBC AUTO-ENTMCNC: 32.3 G/DL (ref 31.5–35.7)
MCV RBC AUTO: 92.8 FL (ref 79–97)
MONOCYTES # BLD AUTO: 1.33 10*3/MM3 (ref 0.1–0.9)
MONOCYTES NFR BLD AUTO: 10.2 % (ref 5–12)
NEUTROPHILS NFR BLD AUTO: 72.8 % (ref 42.7–76)
NEUTROPHILS NFR BLD AUTO: 9.51 10*3/MM3 (ref 1.7–7)
NRBC BLD AUTO-RTO: 0 /100 WBC (ref 0–0.2)
PLATELET # BLD AUTO: 210 10*3/MM3 (ref 140–450)
PMV BLD AUTO: 10.3 FL (ref 6–12)
POTASSIUM SERPL-SCNC: 4 MMOL/L (ref 3.5–5.2)
RBC # BLD AUTO: 4.97 10*6/MM3 (ref 4.14–5.8)
SODIUM SERPL-SCNC: 141 MMOL/L (ref 136–145)
WBC NRBC COR # BLD: 13.06 10*3/MM3 (ref 3.4–10.8)

## 2021-11-19 PROCEDURE — 74019 RADEX ABDOMEN 2 VIEWS: CPT

## 2021-11-19 PROCEDURE — 99231 SBSQ HOSP IP/OBS SF/LOW 25: CPT | Performed by: NURSE PRACTITIONER

## 2021-11-19 PROCEDURE — 0 IOHEXOL 12 MG/ML SOLUTION: Performed by: SURGERY

## 2021-11-19 PROCEDURE — 99232 SBSQ HOSP IP/OBS MODERATE 35: CPT | Performed by: INTERNAL MEDICINE

## 2021-11-19 PROCEDURE — 80048 BASIC METABOLIC PNL TOTAL CA: CPT | Performed by: INTERNAL MEDICINE

## 2021-11-19 PROCEDURE — 83735 ASSAY OF MAGNESIUM: CPT | Performed by: INTERNAL MEDICINE

## 2021-11-19 PROCEDURE — 85025 COMPLETE CBC W/AUTO DIFF WBC: CPT | Performed by: INTERNAL MEDICINE

## 2021-11-19 PROCEDURE — 25010000002 MORPHINE PER 10 MG: Performed by: NURSE PRACTITIONER

## 2021-11-19 RX ADMIN — MORPHINE SULFATE 4 MG: 4 INJECTION INTRAVENOUS at 23:21

## 2021-11-19 RX ADMIN — MORPHINE SULFATE 4 MG: 4 INJECTION INTRAVENOUS at 03:07

## 2021-11-19 RX ADMIN — MORPHINE SULFATE 4 MG: 4 INJECTION INTRAVENOUS at 12:17

## 2021-11-19 RX ADMIN — SODIUM CHLORIDE, POTASSIUM CHLORIDE, SODIUM LACTATE AND CALCIUM CHLORIDE 100 ML/HR: 600; 310; 30; 20 INJECTION, SOLUTION INTRAVENOUS at 20:05

## 2021-11-19 RX ADMIN — SODIUM CHLORIDE, POTASSIUM CHLORIDE, SODIUM LACTATE AND CALCIUM CHLORIDE 100 ML/HR: 600; 310; 30; 20 INJECTION, SOLUTION INTRAVENOUS at 02:56

## 2021-11-19 RX ADMIN — MORPHINE SULFATE 4 MG: 4 INJECTION INTRAVENOUS at 20:15

## 2021-11-19 RX ADMIN — MORPHINE SULFATE 4 MG: 4 INJECTION INTRAVENOUS at 05:42

## 2021-11-19 RX ADMIN — SODIUM CHLORIDE, PRESERVATIVE FREE 10 ML: 5 INJECTION INTRAVENOUS at 08:11

## 2021-11-19 RX ADMIN — MORPHINE SULFATE 4 MG: 4 INJECTION INTRAVENOUS at 08:11

## 2021-11-19 RX ADMIN — IOHEXOL 250 ML: 12 SOLUTION ORAL at 09:05

## 2021-11-19 RX ADMIN — FAMOTIDINE 20 MG: 10 INJECTION INTRAVENOUS at 20:05

## 2021-11-19 RX ADMIN — FAMOTIDINE 20 MG: 10 INJECTION INTRAVENOUS at 12:05

## 2021-11-19 RX ADMIN — MORPHINE SULFATE 4 MG: 4 INJECTION INTRAVENOUS at 17:23

## 2021-11-19 RX ADMIN — SODIUM CHLORIDE, PRESERVATIVE FREE 10 ML: 5 INJECTION INTRAVENOUS at 20:05

## 2021-11-19 RX ADMIN — MORPHINE SULFATE 4 MG: 4 INJECTION INTRAVENOUS at 00:15

## 2021-11-20 ENCOUNTER — APPOINTMENT (OUTPATIENT)
Dept: GENERAL RADIOLOGY | Facility: HOSPITAL | Age: 41
End: 2021-11-20

## 2021-11-20 LAB
ALBUMIN SERPL-MCNC: 3.9 G/DL (ref 3.5–5.2)
ALBUMIN/GLOB SERPL: 1.3 G/DL
ALP SERPL-CCNC: 62 U/L (ref 39–117)
ALT SERPL W P-5'-P-CCNC: 19 U/L (ref 1–41)
ANION GAP SERPL CALCULATED.3IONS-SCNC: 10.2 MMOL/L (ref 5–15)
AST SERPL-CCNC: 17 U/L (ref 1–40)
BILIRUB SERPL-MCNC: 0.7 MG/DL (ref 0–1.2)
BUN SERPL-MCNC: 15 MG/DL (ref 6–20)
BUN/CREAT SERPL: 17.6 (ref 7–25)
CALCIUM SPEC-SCNC: 9.1 MG/DL (ref 8.6–10.5)
CHLORIDE SERPL-SCNC: 98 MMOL/L (ref 98–107)
CO2 SERPL-SCNC: 27.8 MMOL/L (ref 22–29)
CREAT SERPL-MCNC: 0.85 MG/DL (ref 0.76–1.27)
DEPRECATED RDW RBC AUTO: 46.5 FL (ref 37–54)
ERYTHROCYTE [DISTWIDTH] IN BLOOD BY AUTOMATED COUNT: 13.7 % (ref 12.3–15.4)
GFR SERPL CREATININE-BSD FRML MDRD: 99 ML/MIN/1.73
GLOBULIN UR ELPH-MCNC: 2.9 GM/DL
GLUCOSE SERPL-MCNC: 111 MG/DL (ref 65–99)
HCT VFR BLD AUTO: 45.5 % (ref 37.5–51)
HGB BLD-MCNC: 15.1 G/DL (ref 13–17.7)
MAGNESIUM SERPL-MCNC: 2.1 MG/DL (ref 1.6–2.6)
MCH RBC QN AUTO: 30.6 PG (ref 26.6–33)
MCHC RBC AUTO-ENTMCNC: 33.2 G/DL (ref 31.5–35.7)
MCV RBC AUTO: 92.3 FL (ref 79–97)
PHOSPHATE SERPL-MCNC: 3.9 MG/DL (ref 2.5–4.5)
PLATELET # BLD AUTO: 218 10*3/MM3 (ref 140–450)
PMV BLD AUTO: 10.3 FL (ref 6–12)
POTASSIUM SERPL-SCNC: 4.3 MMOL/L (ref 3.5–5.2)
PROT SERPL-MCNC: 6.8 G/DL (ref 6–8.5)
RBC # BLD AUTO: 4.93 10*6/MM3 (ref 4.14–5.8)
SODIUM SERPL-SCNC: 136 MMOL/L (ref 136–145)
WBC NRBC COR # BLD: 10.52 10*3/MM3 (ref 3.4–10.8)

## 2021-11-20 PROCEDURE — 80053 COMPREHEN METABOLIC PANEL: CPT | Performed by: INTERNAL MEDICINE

## 2021-11-20 PROCEDURE — 84100 ASSAY OF PHOSPHORUS: CPT | Performed by: INTERNAL MEDICINE

## 2021-11-20 PROCEDURE — 74019 RADEX ABDOMEN 2 VIEWS: CPT

## 2021-11-20 PROCEDURE — 25010000002 MORPHINE PER 10 MG: Performed by: NURSE PRACTITIONER

## 2021-11-20 PROCEDURE — 25010000002 ONDANSETRON PER 1 MG: Performed by: PHYSICIAN ASSISTANT

## 2021-11-20 PROCEDURE — 85027 COMPLETE CBC AUTOMATED: CPT | Performed by: INTERNAL MEDICINE

## 2021-11-20 PROCEDURE — 99233 SBSQ HOSP IP/OBS HIGH 50: CPT | Performed by: INTERNAL MEDICINE

## 2021-11-20 PROCEDURE — 83735 ASSAY OF MAGNESIUM: CPT | Performed by: INTERNAL MEDICINE

## 2021-11-20 PROCEDURE — 99232 SBSQ HOSP IP/OBS MODERATE 35: CPT | Performed by: SURGERY

## 2021-11-20 RX ADMIN — MORPHINE SULFATE 4 MG: 4 INJECTION INTRAVENOUS at 19:23

## 2021-11-20 RX ADMIN — MORPHINE SULFATE 4 MG: 4 INJECTION INTRAVENOUS at 05:43

## 2021-11-20 RX ADMIN — MORPHINE SULFATE 4 MG: 4 INJECTION INTRAVENOUS at 13:25

## 2021-11-20 RX ADMIN — ONDANSETRON 4 MG: 2 INJECTION INTRAMUSCULAR; INTRAVENOUS at 22:32

## 2021-11-20 RX ADMIN — MORPHINE SULFATE 4 MG: 4 INJECTION INTRAVENOUS at 09:30

## 2021-11-20 RX ADMIN — SODIUM CHLORIDE, POTASSIUM CHLORIDE, SODIUM LACTATE AND CALCIUM CHLORIDE 100 ML/HR: 600; 310; 30; 20 INJECTION, SOLUTION INTRAVENOUS at 19:28

## 2021-11-20 RX ADMIN — MORPHINE SULFATE 4 MG: 4 INJECTION INTRAVENOUS at 16:40

## 2021-11-20 RX ADMIN — FAMOTIDINE 20 MG: 10 INJECTION INTRAVENOUS at 19:23

## 2021-11-20 RX ADMIN — MORPHINE SULFATE 4 MG: 4 INJECTION INTRAVENOUS at 22:33

## 2021-11-20 RX ADMIN — FAMOTIDINE 20 MG: 10 INJECTION INTRAVENOUS at 09:26

## 2021-11-20 RX ADMIN — SODIUM CHLORIDE, PRESERVATIVE FREE 10 ML: 5 INJECTION INTRAVENOUS at 09:26

## 2021-11-20 RX ADMIN — MORPHINE SULFATE 4 MG: 4 INJECTION INTRAVENOUS at 02:18

## 2021-11-21 ENCOUNTER — ANESTHESIA EVENT (OUTPATIENT)
Dept: PERIOP | Facility: HOSPITAL | Age: 41
End: 2021-11-21

## 2021-11-21 ENCOUNTER — ANESTHESIA (OUTPATIENT)
Dept: PERIOP | Facility: HOSPITAL | Age: 41
End: 2021-11-21

## 2021-11-21 PROBLEM — K56.609 SBO (SMALL BOWEL OBSTRUCTION) (HCC): Status: RESOLVED | Noted: 2021-11-16 | Resolved: 2021-11-21

## 2021-11-21 PROBLEM — K56.609 SMALL BOWEL OBSTRUCTION: Status: RESOLVED | Noted: 2021-11-16 | Resolved: 2021-11-21

## 2021-11-21 LAB
ALBUMIN SERPL-MCNC: 3.8 G/DL (ref 3.5–5.2)
ALBUMIN/GLOB SERPL: 1.5 G/DL
ALP SERPL-CCNC: 74 U/L (ref 39–117)
ALT SERPL W P-5'-P-CCNC: 24 U/L (ref 1–41)
ANION GAP SERPL CALCULATED.3IONS-SCNC: 8.5 MMOL/L (ref 5–15)
AST SERPL-CCNC: 23 U/L (ref 1–40)
BILIRUB SERPL-MCNC: 1.1 MG/DL (ref 0–1.2)
BUN SERPL-MCNC: 15 MG/DL (ref 6–20)
BUN/CREAT SERPL: 20 (ref 7–25)
CALCIUM SPEC-SCNC: 8.6 MG/DL (ref 8.6–10.5)
CHLORIDE SERPL-SCNC: 101 MMOL/L (ref 98–107)
CO2 SERPL-SCNC: 26.5 MMOL/L (ref 22–29)
CREAT SERPL-MCNC: 0.75 MG/DL (ref 0.76–1.27)
DEPRECATED RDW RBC AUTO: 44.2 FL (ref 37–54)
ERYTHROCYTE [DISTWIDTH] IN BLOOD BY AUTOMATED COUNT: 13.5 % (ref 12.3–15.4)
GFR SERPL CREATININE-BSD FRML MDRD: 115 ML/MIN/1.73
GLOBULIN UR ELPH-MCNC: 2.6 GM/DL
GLUCOSE SERPL-MCNC: 108 MG/DL (ref 65–99)
HCT VFR BLD AUTO: 41.5 % (ref 37.5–51)
HGB BLD-MCNC: 13.9 G/DL (ref 13–17.7)
MAGNESIUM SERPL-MCNC: 2.1 MG/DL (ref 1.6–2.6)
MCH RBC QN AUTO: 30 PG (ref 26.6–33)
MCHC RBC AUTO-ENTMCNC: 33.5 G/DL (ref 31.5–35.7)
MCV RBC AUTO: 89.4 FL (ref 79–97)
PHOSPHATE SERPL-MCNC: 3.5 MG/DL (ref 2.5–4.5)
PLATELET # BLD AUTO: 206 10*3/MM3 (ref 140–450)
PMV BLD AUTO: 10.5 FL (ref 6–12)
POTASSIUM SERPL-SCNC: 3.9 MMOL/L (ref 3.5–5.2)
PROT SERPL-MCNC: 6.4 G/DL (ref 6–8.5)
RBC # BLD AUTO: 4.64 10*6/MM3 (ref 4.14–5.8)
SODIUM SERPL-SCNC: 136 MMOL/L (ref 136–145)
WBC NRBC COR # BLD: 9.15 10*3/MM3 (ref 3.4–10.8)

## 2021-11-21 PROCEDURE — 85027 COMPLETE CBC AUTOMATED: CPT | Performed by: INTERNAL MEDICINE

## 2021-11-21 PROCEDURE — 0 MEPERIDINE PER 100 MG: Performed by: ANESTHESIOLOGY

## 2021-11-21 PROCEDURE — 25010000002 DEXAMETHASONE PER 1 MG: Performed by: ANESTHESIOLOGY

## 2021-11-21 PROCEDURE — 0W9G0ZZ DRAINAGE OF PERITONEAL CAVITY, OPEN APPROACH: ICD-10-PCS | Performed by: SURGERY

## 2021-11-21 PROCEDURE — 0DTJ0ZZ RESECTION OF APPENDIX, OPEN APPROACH: ICD-10-PCS | Performed by: SURGERY

## 2021-11-21 PROCEDURE — 25010000002 ONDANSETRON PER 1 MG: Performed by: ANESTHESIOLOGY

## 2021-11-21 PROCEDURE — 84100 ASSAY OF PHOSPHORUS: CPT | Performed by: SURGERY

## 2021-11-21 PROCEDURE — 99233 SBSQ HOSP IP/OBS HIGH 50: CPT | Performed by: INTERNAL MEDICINE

## 2021-11-21 PROCEDURE — 80053 COMPREHEN METABOLIC PANEL: CPT | Performed by: INTERNAL MEDICINE

## 2021-11-21 PROCEDURE — 25010000002 HYDROMORPHONE PER 4 MG: Performed by: ANESTHESIOLOGY

## 2021-11-21 PROCEDURE — 88302 TISSUE EXAM BY PATHOLOGIST: CPT | Performed by: SURGERY

## 2021-11-21 PROCEDURE — 25010000002 HYDROMORPHONE 1 MG/ML SOLUTION: Performed by: ANESTHESIOLOGY

## 2021-11-21 PROCEDURE — 83735 ASSAY OF MAGNESIUM: CPT | Performed by: INTERNAL MEDICINE

## 2021-11-21 PROCEDURE — C1765 ADHESION BARRIER: HCPCS | Performed by: SURGERY

## 2021-11-21 PROCEDURE — 25010000002 HYDROMORPHONE 1 MG/ML SOLUTION: Performed by: INTERNAL MEDICINE

## 2021-11-21 PROCEDURE — 25010000002 MORPHINE PER 10 MG: Performed by: SURGERY

## 2021-11-21 PROCEDURE — 0DNB0ZZ RELEASE ILEUM, OPEN APPROACH: ICD-10-PCS | Performed by: SURGERY

## 2021-11-21 PROCEDURE — 44005 FREEING OF BOWEL ADHESION: CPT | Performed by: SURGERY

## 2021-11-21 PROCEDURE — 0DN80ZZ RELEASE SMALL INTESTINE, OPEN APPROACH: ICD-10-PCS | Performed by: SURGERY

## 2021-11-21 PROCEDURE — 25010000002 MORPHINE PER 10 MG: Performed by: NURSE PRACTITIONER

## 2021-11-21 PROCEDURE — 25010000002 PROPOFOL 10 MG/ML EMULSION: Performed by: ANESTHESIOLOGY

## 2021-11-21 PROCEDURE — C1889 IMPLANT/INSERT DEVICE, NOC: HCPCS | Performed by: SURGERY

## 2021-11-21 DEVICE — PROXIMATE RELOADABLE LINEAR CUTTER WITH SAFETY LOCK-OUT.  55MM LINEAR CUTTER.
Type: IMPLANTABLE DEVICE | Site: ABDOMEN | Status: FUNCTIONAL
Brand: PROXIMATE

## 2021-11-21 RX ORDER — SODIUM CHLORIDE 0.9 % (FLUSH) 0.9 %
10 SYRINGE (ML) INJECTION AS NEEDED
Status: DISCONTINUED | OUTPATIENT
Start: 2021-11-21 | End: 2021-11-21 | Stop reason: HOSPADM

## 2021-11-21 RX ORDER — DEXAMETHASONE SODIUM PHOSPHATE 4 MG/ML
INJECTION, SOLUTION INTRA-ARTICULAR; INTRALESIONAL; INTRAMUSCULAR; INTRAVENOUS; SOFT TISSUE AS NEEDED
Status: DISCONTINUED | OUTPATIENT
Start: 2021-11-21 | End: 2021-11-21 | Stop reason: SURG

## 2021-11-21 RX ORDER — PROMETHAZINE HYDROCHLORIDE 25 MG/1
25 SUPPOSITORY RECTAL ONCE AS NEEDED
Status: DISCONTINUED | OUTPATIENT
Start: 2021-11-21 | End: 2021-11-21 | Stop reason: HOSPADM

## 2021-11-21 RX ORDER — KETAMINE HYDROCHLORIDE 50 MG/ML
INJECTION, SOLUTION, CONCENTRATE INTRAMUSCULAR; INTRAVENOUS AS NEEDED
Status: DISCONTINUED | OUTPATIENT
Start: 2021-11-21 | End: 2021-11-21 | Stop reason: SURG

## 2021-11-21 RX ORDER — ONDANSETRON 2 MG/ML
4 INJECTION INTRAMUSCULAR; INTRAVENOUS ONCE AS NEEDED
Status: DISCONTINUED | OUTPATIENT
Start: 2021-11-21 | End: 2021-11-21 | Stop reason: HOSPADM

## 2021-11-21 RX ORDER — MEPERIDINE HYDROCHLORIDE 25 MG/ML
12.5 INJECTION INTRAMUSCULAR; INTRAVENOUS; SUBCUTANEOUS
Status: COMPLETED | OUTPATIENT
Start: 2021-11-21 | End: 2021-11-21

## 2021-11-21 RX ORDER — PROPOFOL 10 MG/ML
VIAL (ML) INTRAVENOUS AS NEEDED
Status: DISCONTINUED | OUTPATIENT
Start: 2021-11-21 | End: 2021-11-21 | Stop reason: SURG

## 2021-11-21 RX ORDER — HYDROMORPHONE HCL 110MG/55ML
PATIENT CONTROLLED ANALGESIA SYRINGE INTRAVENOUS AS NEEDED
Status: DISCONTINUED | OUTPATIENT
Start: 2021-11-21 | End: 2021-11-21 | Stop reason: SURG

## 2021-11-21 RX ORDER — PROMETHAZINE HYDROCHLORIDE 12.5 MG/1
25 TABLET ORAL ONCE AS NEEDED
Status: DISCONTINUED | OUTPATIENT
Start: 2021-11-21 | End: 2021-11-21 | Stop reason: HOSPADM

## 2021-11-21 RX ORDER — SODIUM CHLORIDE, SODIUM LACTATE, POTASSIUM CHLORIDE, CALCIUM CHLORIDE 600; 310; 30; 20 MG/100ML; MG/100ML; MG/100ML; MG/100ML
9 INJECTION, SOLUTION INTRAVENOUS CONTINUOUS PRN
Status: DISCONTINUED | OUTPATIENT
Start: 2021-11-21 | End: 2021-11-28 | Stop reason: HOSPADM

## 2021-11-21 RX ORDER — MAGNESIUM HYDROXIDE 1200 MG/15ML
LIQUID ORAL AS NEEDED
Status: DISCONTINUED | OUTPATIENT
Start: 2021-11-21 | End: 2021-11-21 | Stop reason: HOSPADM

## 2021-11-21 RX ORDER — LIDOCAINE HYDROCHLORIDE 20 MG/ML
INJECTION, SOLUTION INFILTRATION; PERINEURAL AS NEEDED
Status: DISCONTINUED | OUTPATIENT
Start: 2021-11-21 | End: 2021-11-21 | Stop reason: SURG

## 2021-11-21 RX ORDER — SUCCINYLCHOLINE/SOD CL,ISO/PF 100 MG/5ML
SYRINGE (ML) INTRAVENOUS AS NEEDED
Status: DISCONTINUED | OUTPATIENT
Start: 2021-11-21 | End: 2021-11-21 | Stop reason: SURG

## 2021-11-21 RX ORDER — SODIUM CHLORIDE 0.9 % (FLUSH) 0.9 %
10 SYRINGE (ML) INJECTION EVERY 12 HOURS SCHEDULED
Status: DISCONTINUED | OUTPATIENT
Start: 2021-11-21 | End: 2021-11-21 | Stop reason: HOSPADM

## 2021-11-21 RX ORDER — ONDANSETRON 2 MG/ML
INJECTION INTRAMUSCULAR; INTRAVENOUS AS NEEDED
Status: DISCONTINUED | OUTPATIENT
Start: 2021-11-21 | End: 2021-11-21 | Stop reason: SURG

## 2021-11-21 RX ORDER — OXYCODONE HYDROCHLORIDE 5 MG/1
5 TABLET ORAL
Status: DISCONTINUED | OUTPATIENT
Start: 2021-11-21 | End: 2021-11-21 | Stop reason: HOSPADM

## 2021-11-21 RX ORDER — ROCURONIUM BROMIDE 10 MG/ML
INJECTION, SOLUTION INTRAVENOUS AS NEEDED
Status: DISCONTINUED | OUTPATIENT
Start: 2021-11-21 | End: 2021-11-21 | Stop reason: SURG

## 2021-11-21 RX ADMIN — MEPERIDINE HYDROCHLORIDE 12.5 MG: 25 INJECTION INTRAMUSCULAR; INTRAVENOUS; SUBCUTANEOUS at 10:18

## 2021-11-21 RX ADMIN — PROPOFOL 200 MG: 10 INJECTION, EMULSION INTRAVENOUS at 07:51

## 2021-11-21 RX ADMIN — MORPHINE SULFATE 4 MG: 4 INJECTION INTRAVENOUS at 11:41

## 2021-11-21 RX ADMIN — ROCURONIUM BROMIDE 20 MG: 10 INJECTION INTRAVENOUS at 09:05

## 2021-11-21 RX ADMIN — SUGAMMADEX 200 MG: 100 INJECTION, SOLUTION INTRAVENOUS at 09:23

## 2021-11-21 RX ADMIN — LIDOCAINE HYDROCHLORIDE 100 MG: 20 INJECTION, SOLUTION INFILTRATION; PERINEURAL at 07:51

## 2021-11-21 RX ADMIN — Medication 100 MG: at 07:51

## 2021-11-21 RX ADMIN — HYDROMORPHONE HYDROCHLORIDE 0.5 MG: 1 INJECTION, SOLUTION INTRAMUSCULAR; INTRAVENOUS; SUBCUTANEOUS at 09:51

## 2021-11-21 RX ADMIN — MORPHINE SULFATE 4 MG: 4 INJECTION INTRAVENOUS at 13:45

## 2021-11-21 RX ADMIN — HYDROMORPHONE HYDROCHLORIDE 1 MG: 2 INJECTION, SOLUTION INTRAMUSCULAR; INTRAVENOUS; SUBCUTANEOUS at 08:06

## 2021-11-21 RX ADMIN — HYDROMORPHONE HYDROCHLORIDE 1 MG: 2 INJECTION, SOLUTION INTRAMUSCULAR; INTRAVENOUS; SUBCUTANEOUS at 07:46

## 2021-11-21 RX ADMIN — SODIUM CHLORIDE, PRESERVATIVE FREE 10 ML: 5 INJECTION INTRAVENOUS at 21:39

## 2021-11-21 RX ADMIN — MEPERIDINE HYDROCHLORIDE 12.5 MG: 25 INJECTION INTRAMUSCULAR; INTRAVENOUS; SUBCUTANEOUS at 09:47

## 2021-11-21 RX ADMIN — HYDROMORPHONE HYDROCHLORIDE 0.5 MG: 1 INJECTION, SOLUTION INTRAMUSCULAR; INTRAVENOUS; SUBCUTANEOUS at 09:45

## 2021-11-21 RX ADMIN — MORPHINE SULFATE 4 MG: 4 INJECTION INTRAVENOUS at 02:35

## 2021-11-21 RX ADMIN — ROCURONIUM BROMIDE 10 MG: 10 INJECTION INTRAVENOUS at 07:51

## 2021-11-21 RX ADMIN — ROCURONIUM BROMIDE 20 MG: 10 INJECTION INTRAVENOUS at 08:29

## 2021-11-21 RX ADMIN — HYDROMORPHONE HYDROCHLORIDE 0.5 MG: 1 INJECTION, SOLUTION INTRAMUSCULAR; INTRAVENOUS; SUBCUTANEOUS at 21:39

## 2021-11-21 RX ADMIN — SODIUM CHLORIDE, POTASSIUM CHLORIDE, SODIUM LACTATE AND CALCIUM CHLORIDE: 600; 310; 30; 20 INJECTION, SOLUTION INTRAVENOUS at 08:56

## 2021-11-21 RX ADMIN — HYDROMORPHONE HYDROCHLORIDE 0.5 MG: 1 INJECTION, SOLUTION INTRAMUSCULAR; INTRAVENOUS; SUBCUTANEOUS at 17:42

## 2021-11-21 RX ADMIN — FAMOTIDINE 20 MG: 10 INJECTION INTRAVENOUS at 20:31

## 2021-11-21 RX ADMIN — HYDROMORPHONE HYDROCHLORIDE 0.5 MG: 1 INJECTION, SOLUTION INTRAMUSCULAR; INTRAVENOUS; SUBCUTANEOUS at 15:37

## 2021-11-21 RX ADMIN — ONDANSETRON 4 MG: 2 INJECTION INTRAMUSCULAR; INTRAVENOUS at 09:14

## 2021-11-21 RX ADMIN — HYDROMORPHONE HYDROCHLORIDE 0.5 MG: 1 INJECTION, SOLUTION INTRAMUSCULAR; INTRAVENOUS; SUBCUTANEOUS at 23:39

## 2021-11-21 RX ADMIN — SODIUM CHLORIDE, POTASSIUM CHLORIDE, SODIUM LACTATE AND CALCIUM CHLORIDE 100 ML/HR: 600; 310; 30; 20 INJECTION, SOLUTION INTRAVENOUS at 19:00

## 2021-11-21 RX ADMIN — HYDROMORPHONE HYDROCHLORIDE 0.5 MG: 1 INJECTION, SOLUTION INTRAMUSCULAR; INTRAVENOUS; SUBCUTANEOUS at 10:18

## 2021-11-21 RX ADMIN — KETAMINE HYDROCHLORIDE 20 MG: 50 INJECTION, SOLUTION INTRAMUSCULAR; INTRAVENOUS at 08:29

## 2021-11-21 RX ADMIN — ROCURONIUM BROMIDE 20 MG: 10 INJECTION INTRAVENOUS at 08:04

## 2021-11-21 RX ADMIN — DEXAMETHASONE SODIUM PHOSPHATE 4 MG: 4 INJECTION INTRA-ARTICULAR; INTRALESIONAL; INTRAMUSCULAR; INTRAVENOUS; SOFT TISSUE at 09:14

## 2021-11-21 RX ADMIN — HYDROMORPHONE HYDROCHLORIDE 0.5 MG: 1 INJECTION, SOLUTION INTRAMUSCULAR; INTRAVENOUS; SUBCUTANEOUS at 19:36

## 2021-11-21 RX ADMIN — HYDROMORPHONE HYDROCHLORIDE 0.5 MG: 1 INJECTION, SOLUTION INTRAMUSCULAR; INTRAVENOUS; SUBCUTANEOUS at 10:02

## 2021-11-21 NOTE — ANESTHESIA PROCEDURE NOTES
Airway  Date/Time: 11/21/2021 7:52 AM    General Information and Staff    Patient location during procedure: OR  CRNA: Olivia Phillips CRNA    Indications and Patient Condition  Indications for airway management: airway protection    Preoxygenated: yes  MILS maintained throughout  Mask difficulty assessment: 1 - vent by mask    Final Airway Details  Final airway type: endotracheal airway      Successful airway: ETT  Cuffed: yes   Successful intubation technique: video laryngoscopy  Facilitating devices/methods: intubating stylet  Endotracheal tube insertion site: oral  Blade: Nirmala  Blade size: 3  ETT size (mm): 8.0  Cormack-Lehane Classification: grade I - full view of glottis  Placement verified by: chest auscultation and capnometry   Measured from: lips  ETT/EBT  to lips (cm): 22  Number of attempts at approach: 1  Assessment: lips, teeth, and gum same as pre-op and atraumatic intubation

## 2021-11-21 NOTE — ANESTHESIA POSTPROCEDURE EVALUATION
Patient: Luis Angel Jade    Procedure Summary     Date: 11/21/21 Room / Location: [facility] OR 05 / [facility] MAIN OR    Anesthesia Start: 0743 Anesthesia Stop: 0936    Procedure: EXPLORATORY LAPAROTOMY, LYSIS OF ADHESIONS,  APPENDECTOMY, (N/A Abdomen) Diagnosis:       SBO (small bowel obstruction) (HCC)      (SBO (small bowel obstruction) (HCC) [K56.609])    Surgeons: Siddhartha Adams MD Provider: Marilyn Horn MD    Anesthesia Type: general ASA Status: 3 - Emergent          Anesthesia Type: general    Vitals  Vitals Value Taken Time   /82 11/21/21 1014   Temp 36.1 °C (97 °F) 11/21/21 0940   Pulse 104 11/21/21 1019   Resp 15 11/21/21 1005   SpO2 98 % 11/21/21 1019   Vitals shown include unvalidated device data.        Post Anesthesia Care and Evaluation    Patient location during evaluation: bedside  Patient participation: complete - patient participated  Level of consciousness: awake  Pain score: 0  Pain management: adequate  Airway patency: patent  Anesthetic complications: No anesthetic complications  PONV Status: none  Cardiovascular status: acceptable and stable  Respiratory status: acceptable and room air  Hydration status: acceptable    Comments: An Anesthesiologist personally participated in the most demanding procedures (including induction and emergence if applicable) in the anesthesia plan, monitored the course of anesthesia administration at frequent intervals and remained physically present and available for immediate diagnosis and treatment of emergencies.

## 2021-11-21 NOTE — ANESTHESIA PREPROCEDURE EVALUATION
Anesthesia Evaluation     Patient summary reviewed and Nursing notes reviewed   no history of anesthetic complications:  NPO Solid Status: > 8 hours  NPO Liquid Status: > 2 hours           Airway   Mallampati: III  TM distance: <3 FB  Neck ROM: full  Possible difficult intubation and Anterior  Dental    (+) poor dentition    Pulmonary - normal exam    breath sounds clear to auscultation  (+) a smoker Current Abstained day of surgery,   Cardiovascular - normal exam  Exercise tolerance: good (4-7 METS)    Rhythm: regular    (+) hypertension, PVD, hyperlipidemia,       Neuro/Psych- negative ROS  GI/Hepatic/Renal/Endo - negative ROS     Musculoskeletal (-) negative ROS    Abdominal    Substance History - negative use     OB/GYN negative ob/gyn ROS         Other - negative ROS                       Anesthesia Plan    ASA 3 - emergent     general   (Patient understands anesthesia not responsible for dental damage.)  intravenous induction     Anesthetic plan, all risks, benefits, and alternatives have been provided, discussed and informed consent has been obtained with: patient.  Use of blood products discussed with patient .   Plan discussed with CRNA.

## 2021-11-22 PROBLEM — Z90.49 S/P APPENDECTOMY: Status: ACTIVE | Noted: 2021-11-22

## 2021-11-22 PROBLEM — Z98.890 S/P EXPLORATORY LAPAROTOMY: Status: ACTIVE | Noted: 2021-11-22

## 2021-11-22 LAB
ANION GAP SERPL CALCULATED.3IONS-SCNC: 14.6 MMOL/L (ref 5–15)
BUN SERPL-MCNC: 18 MG/DL (ref 6–20)
BUN/CREAT SERPL: 27.3 (ref 7–25)
CALCIUM SPEC-SCNC: 7.9 MG/DL (ref 8.6–10.5)
CHLORIDE SERPL-SCNC: 98 MMOL/L (ref 98–107)
CO2 SERPL-SCNC: 21.4 MMOL/L (ref 22–29)
CREAT SERPL-MCNC: 0.66 MG/DL (ref 0.76–1.27)
DEPRECATED RDW RBC AUTO: 47.5 FL (ref 37–54)
ERYTHROCYTE [DISTWIDTH] IN BLOOD BY AUTOMATED COUNT: 14 % (ref 12.3–15.4)
GFR SERPL CREATININE-BSD FRML MDRD: 133 ML/MIN/1.73
GLUCOSE SERPL-MCNC: 143 MG/DL (ref 65–99)
HCT VFR BLD AUTO: 39.3 % (ref 37.5–51)
HGB BLD-MCNC: 13 G/DL (ref 13–17.7)
MAGNESIUM SERPL-MCNC: 1.8 MG/DL (ref 1.6–2.6)
MCH RBC QN AUTO: 30.7 PG (ref 26.6–33)
MCHC RBC AUTO-ENTMCNC: 33.1 G/DL (ref 31.5–35.7)
MCV RBC AUTO: 92.7 FL (ref 79–97)
PHOSPHATE SERPL-MCNC: 2.4 MG/DL (ref 2.5–4.5)
PLATELET # BLD AUTO: 222 10*3/MM3 (ref 140–450)
PMV BLD AUTO: 10.2 FL (ref 6–12)
POTASSIUM SERPL-SCNC: 4.1 MMOL/L (ref 3.5–5.2)
RBC # BLD AUTO: 4.24 10*6/MM3 (ref 4.14–5.8)
SODIUM SERPL-SCNC: 134 MMOL/L (ref 136–145)
WBC NRBC COR # BLD: 13.36 10*3/MM3 (ref 3.4–10.8)

## 2021-11-22 PROCEDURE — 99024 POSTOP FOLLOW-UP VISIT: CPT | Performed by: NURSE PRACTITIONER

## 2021-11-22 PROCEDURE — 83735 ASSAY OF MAGNESIUM: CPT | Performed by: SURGERY

## 2021-11-22 PROCEDURE — 25010000002 KETOROLAC TROMETHAMINE PER 15 MG: Performed by: SURGERY

## 2021-11-22 PROCEDURE — 25010000002 HYDROMORPHONE 1 MG/ML SOLUTION: Performed by: INTERNAL MEDICINE

## 2021-11-22 PROCEDURE — 99233 SBSQ HOSP IP/OBS HIGH 50: CPT | Performed by: INTERNAL MEDICINE

## 2021-11-22 PROCEDURE — 85027 COMPLETE CBC AUTOMATED: CPT | Performed by: INTERNAL MEDICINE

## 2021-11-22 PROCEDURE — 84100 ASSAY OF PHOSPHORUS: CPT | Performed by: SURGERY

## 2021-11-22 PROCEDURE — 80048 BASIC METABOLIC PNL TOTAL CA: CPT | Performed by: SURGERY

## 2021-11-22 RX ORDER — KETOROLAC TROMETHAMINE 30 MG/ML
30 INJECTION, SOLUTION INTRAMUSCULAR; INTRAVENOUS EVERY 6 HOURS
Status: COMPLETED | OUTPATIENT
Start: 2021-11-22 | End: 2021-11-24

## 2021-11-22 RX ADMIN — HYDROMORPHONE HYDROCHLORIDE 1 MG: 1 INJECTION, SOLUTION INTRAMUSCULAR; INTRAVENOUS; SUBCUTANEOUS at 17:12

## 2021-11-22 RX ADMIN — FAMOTIDINE 20 MG: 10 INJECTION INTRAVENOUS at 07:49

## 2021-11-22 RX ADMIN — FAMOTIDINE 20 MG: 10 INJECTION INTRAVENOUS at 21:53

## 2021-11-22 RX ADMIN — KETOROLAC TROMETHAMINE 30 MG: 30 INJECTION, SOLUTION INTRAMUSCULAR; INTRAVENOUS at 23:25

## 2021-11-22 RX ADMIN — SODIUM CHLORIDE, POTASSIUM CHLORIDE, SODIUM LACTATE AND CALCIUM CHLORIDE 100 ML/HR: 600; 310; 30; 20 INJECTION, SOLUTION INTRAVENOUS at 03:43

## 2021-11-22 RX ADMIN — HYDROMORPHONE HYDROCHLORIDE 0.5 MG: 1 INJECTION, SOLUTION INTRAMUSCULAR; INTRAVENOUS; SUBCUTANEOUS at 14:54

## 2021-11-22 RX ADMIN — HYDROMORPHONE HYDROCHLORIDE 0.5 MG: 1 INJECTION, SOLUTION INTRAMUSCULAR; INTRAVENOUS; SUBCUTANEOUS at 12:27

## 2021-11-22 RX ADMIN — HYDROMORPHONE HYDROCHLORIDE 0.5 MG: 1 INJECTION, SOLUTION INTRAMUSCULAR; INTRAVENOUS; SUBCUTANEOUS at 10:19

## 2021-11-22 RX ADMIN — SODIUM CHLORIDE, PRESERVATIVE FREE 10 ML: 5 INJECTION INTRAVENOUS at 21:53

## 2021-11-22 RX ADMIN — HYDROMORPHONE HYDROCHLORIDE 0.5 MG: 1 INJECTION, SOLUTION INTRAMUSCULAR; INTRAVENOUS; SUBCUTANEOUS at 03:44

## 2021-11-22 RX ADMIN — HYDROMORPHONE HYDROCHLORIDE 1 MG: 1 INJECTION, SOLUTION INTRAMUSCULAR; INTRAVENOUS; SUBCUTANEOUS at 21:53

## 2021-11-22 RX ADMIN — HYDROMORPHONE HYDROCHLORIDE 0.5 MG: 1 INJECTION, SOLUTION INTRAMUSCULAR; INTRAVENOUS; SUBCUTANEOUS at 01:40

## 2021-11-22 RX ADMIN — SODIUM CHLORIDE, POTASSIUM CHLORIDE, SODIUM LACTATE AND CALCIUM CHLORIDE 100 ML/HR: 600; 310; 30; 20 INJECTION, SOLUTION INTRAVENOUS at 05:39

## 2021-11-22 RX ADMIN — SODIUM CHLORIDE, PRESERVATIVE FREE 10 ML: 5 INJECTION INTRAVENOUS at 07:50

## 2021-11-22 RX ADMIN — SODIUM CHLORIDE, POTASSIUM CHLORIDE, SODIUM LACTATE AND CALCIUM CHLORIDE 1000 ML: 600; 310; 30; 20 INJECTION, SOLUTION INTRAVENOUS at 04:50

## 2021-11-22 RX ADMIN — SODIUM CHLORIDE, POTASSIUM CHLORIDE, SODIUM LACTATE AND CALCIUM CHLORIDE 100 ML/HR: 600; 310; 30; 20 INJECTION, SOLUTION INTRAVENOUS at 16:01

## 2021-11-22 RX ADMIN — KETOROLAC TROMETHAMINE 30 MG: 30 INJECTION, SOLUTION INTRAMUSCULAR; INTRAVENOUS at 17:12

## 2021-11-22 RX ADMIN — HYDROMORPHONE HYDROCHLORIDE 0.5 MG: 1 INJECTION, SOLUTION INTRAMUSCULAR; INTRAVENOUS; SUBCUTANEOUS at 05:39

## 2021-11-22 RX ADMIN — SODIUM CHLORIDE, POTASSIUM CHLORIDE, SODIUM LACTATE AND CALCIUM CHLORIDE 150 ML/HR: 600; 310; 30; 20 INJECTION, SOLUTION INTRAVENOUS at 21:17

## 2021-11-22 RX ADMIN — HYDROMORPHONE HYDROCHLORIDE 0.5 MG: 1 INJECTION, SOLUTION INTRAMUSCULAR; INTRAVENOUS; SUBCUTANEOUS at 07:49

## 2021-11-23 LAB
ALBUMIN SERPL-MCNC: 3 G/DL (ref 3.5–5.2)
ALBUMIN/GLOB SERPL: 1.1 G/DL
ALP SERPL-CCNC: 152 U/L (ref 39–117)
ALT SERPL W P-5'-P-CCNC: 67 U/L (ref 1–41)
ANION GAP SERPL CALCULATED.3IONS-SCNC: 11 MMOL/L (ref 5–15)
AST SERPL-CCNC: 69 U/L (ref 1–40)
BASOPHILS # BLD AUTO: 0.06 10*3/MM3 (ref 0–0.2)
BASOPHILS NFR BLD AUTO: 0.4 % (ref 0–1.5)
BILIRUB SERPL-MCNC: 1.9 MG/DL (ref 0–1.2)
BUN SERPL-MCNC: 13 MG/DL (ref 6–20)
BUN/CREAT SERPL: 14.4 (ref 7–25)
CALCIUM SPEC-SCNC: 8.6 MG/DL (ref 8.6–10.5)
CHLORIDE SERPL-SCNC: 99 MMOL/L (ref 98–107)
CO2 SERPL-SCNC: 26 MMOL/L (ref 22–29)
CREAT SERPL-MCNC: 0.9 MG/DL (ref 0.76–1.27)
CYTO UR: NORMAL
DEPRECATED RDW RBC AUTO: 49.1 FL (ref 37–54)
EOSINOPHIL # BLD AUTO: 0.17 10*3/MM3 (ref 0–0.4)
EOSINOPHIL NFR BLD AUTO: 1.2 % (ref 0.3–6.2)
ERYTHROCYTE [DISTWIDTH] IN BLOOD BY AUTOMATED COUNT: 14 % (ref 12.3–15.4)
GFR SERPL CREATININE-BSD FRML MDRD: 93 ML/MIN/1.73
GLOBULIN UR ELPH-MCNC: 2.8 GM/DL
GLUCOSE SERPL-MCNC: 125 MG/DL (ref 65–99)
HCT VFR BLD AUTO: 35.6 % (ref 37.5–51)
HGB BLD-MCNC: 11.3 G/DL (ref 13–17.7)
IMM GRANULOCYTES # BLD AUTO: 0.06 10*3/MM3 (ref 0–0.05)
IMM GRANULOCYTES NFR BLD AUTO: 0.4 % (ref 0–0.5)
LAB AP CASE REPORT: NORMAL
LAB AP CLINICAL INFORMATION: NORMAL
LYMPHOCYTES # BLD AUTO: 1.91 10*3/MM3 (ref 0.7–3.1)
LYMPHOCYTES NFR BLD AUTO: 14 % (ref 19.6–45.3)
MAGNESIUM SERPL-MCNC: 2.1 MG/DL (ref 1.6–2.6)
MCH RBC QN AUTO: 30.3 PG (ref 26.6–33)
MCHC RBC AUTO-ENTMCNC: 31.7 G/DL (ref 31.5–35.7)
MCV RBC AUTO: 95.4 FL (ref 79–97)
MONOCYTES # BLD AUTO: 1.19 10*3/MM3 (ref 0.1–0.9)
MONOCYTES NFR BLD AUTO: 8.7 % (ref 5–12)
NEUTROPHILS NFR BLD AUTO: 10.25 10*3/MM3 (ref 1.7–7)
NEUTROPHILS NFR BLD AUTO: 75.3 % (ref 42.7–76)
NRBC BLD AUTO-RTO: 0 /100 WBC (ref 0–0.2)
PATH REPORT.FINAL DX SPEC: NORMAL
PATH REPORT.GROSS SPEC: NORMAL
PLATELET # BLD AUTO: 220 10*3/MM3 (ref 140–450)
PMV BLD AUTO: 10.8 FL (ref 6–12)
POTASSIUM SERPL-SCNC: 4 MMOL/L (ref 3.5–5.2)
PROT SERPL-MCNC: 5.8 G/DL (ref 6–8.5)
RBC # BLD AUTO: 3.73 10*6/MM3 (ref 4.14–5.8)
SODIUM SERPL-SCNC: 136 MMOL/L (ref 136–145)
WBC NRBC COR # BLD: 13.64 10*3/MM3 (ref 3.4–10.8)

## 2021-11-23 PROCEDURE — 83735 ASSAY OF MAGNESIUM: CPT | Performed by: INTERNAL MEDICINE

## 2021-11-23 PROCEDURE — 25010000002 HYDROMORPHONE 1 MG/ML SOLUTION: Performed by: INTERNAL MEDICINE

## 2021-11-23 PROCEDURE — 25010000002 KETOROLAC TROMETHAMINE PER 15 MG: Performed by: SURGERY

## 2021-11-23 PROCEDURE — 80053 COMPREHEN METABOLIC PANEL: CPT | Performed by: INTERNAL MEDICINE

## 2021-11-23 PROCEDURE — 85025 COMPLETE CBC W/AUTO DIFF WBC: CPT | Performed by: NURSE PRACTITIONER

## 2021-11-23 PROCEDURE — 99233 SBSQ HOSP IP/OBS HIGH 50: CPT | Performed by: INTERNAL MEDICINE

## 2021-11-23 PROCEDURE — 99024 POSTOP FOLLOW-UP VISIT: CPT | Performed by: NURSE PRACTITIONER

## 2021-11-23 RX ADMIN — KETOROLAC TROMETHAMINE 30 MG: 30 INJECTION, SOLUTION INTRAMUSCULAR; INTRAVENOUS at 16:33

## 2021-11-23 RX ADMIN — SODIUM CHLORIDE, PRESERVATIVE FREE 10 ML: 5 INJECTION INTRAVENOUS at 20:38

## 2021-11-23 RX ADMIN — SODIUM CHLORIDE, POTASSIUM CHLORIDE, SODIUM LACTATE AND CALCIUM CHLORIDE 150 ML/HR: 600; 310; 30; 20 INJECTION, SOLUTION INTRAVENOUS at 10:49

## 2021-11-23 RX ADMIN — HYDROMORPHONE HYDROCHLORIDE 1 MG: 1 INJECTION, SOLUTION INTRAMUSCULAR; INTRAVENOUS; SUBCUTANEOUS at 08:58

## 2021-11-23 RX ADMIN — KETOROLAC TROMETHAMINE 30 MG: 30 INJECTION, SOLUTION INTRAMUSCULAR; INTRAVENOUS at 05:44

## 2021-11-23 RX ADMIN — HYDROMORPHONE HYDROCHLORIDE 1 MG: 1 INJECTION, SOLUTION INTRAMUSCULAR; INTRAVENOUS; SUBCUTANEOUS at 16:31

## 2021-11-23 RX ADMIN — KETOROLAC TROMETHAMINE 30 MG: 30 INJECTION, SOLUTION INTRAMUSCULAR; INTRAVENOUS at 23:03

## 2021-11-23 RX ADMIN — SODIUM CHLORIDE, PRESERVATIVE FREE 10 ML: 5 INJECTION INTRAVENOUS at 09:02

## 2021-11-23 RX ADMIN — FAMOTIDINE 20 MG: 10 INJECTION INTRAVENOUS at 09:01

## 2021-11-23 RX ADMIN — FAMOTIDINE 20 MG: 10 INJECTION INTRAVENOUS at 20:38

## 2021-11-23 RX ADMIN — HYDROMORPHONE HYDROCHLORIDE 1 MG: 1 INJECTION, SOLUTION INTRAMUSCULAR; INTRAVENOUS; SUBCUTANEOUS at 03:06

## 2021-11-23 RX ADMIN — KETOROLAC TROMETHAMINE 30 MG: 30 INJECTION, SOLUTION INTRAMUSCULAR; INTRAVENOUS at 11:20

## 2021-11-23 RX ADMIN — HYDROMORPHONE HYDROCHLORIDE 1 MG: 1 INJECTION, SOLUTION INTRAMUSCULAR; INTRAVENOUS; SUBCUTANEOUS at 13:23

## 2021-11-23 RX ADMIN — SODIUM CHLORIDE, POTASSIUM CHLORIDE, SODIUM LACTATE AND CALCIUM CHLORIDE 150 ML/HR: 600; 310; 30; 20 INJECTION, SOLUTION INTRAVENOUS at 17:44

## 2021-11-23 RX ADMIN — HYDROMORPHONE HYDROCHLORIDE 1 MG: 1 INJECTION, SOLUTION INTRAMUSCULAR; INTRAVENOUS; SUBCUTANEOUS at 20:38

## 2021-11-23 RX ADMIN — SODIUM CHLORIDE, POTASSIUM CHLORIDE, SODIUM LACTATE AND CALCIUM CHLORIDE 150 ML/HR: 600; 310; 30; 20 INJECTION, SOLUTION INTRAVENOUS at 03:56

## 2021-11-24 LAB
ALBUMIN SERPL-MCNC: 1.9 G/DL (ref 3.5–5.2)
ALBUMIN/GLOB SERPL: 0.9 G/DL
ALP SERPL-CCNC: 135 U/L (ref 39–117)
ALT SERPL W P-5'-P-CCNC: 48 U/L (ref 1–41)
ANION GAP SERPL CALCULATED.3IONS-SCNC: 12.8 MMOL/L (ref 5–15)
AST SERPL-CCNC: 44 U/L (ref 1–40)
BASOPHILS # BLD AUTO: 0.03 10*3/MM3 (ref 0–0.2)
BASOPHILS NFR BLD AUTO: 0.3 % (ref 0–1.5)
BILIRUB SERPL-MCNC: 1.1 MG/DL (ref 0–1.2)
BUN SERPL-MCNC: 7 MG/DL (ref 6–20)
BUN/CREAT SERPL: 17.5 (ref 7–25)
CALCIUM SPEC-SCNC: 7.6 MG/DL (ref 8.6–10.5)
CHLORIDE SERPL-SCNC: 101 MMOL/L (ref 98–107)
CO2 SERPL-SCNC: 19.2 MMOL/L (ref 22–29)
CREAT SERPL-MCNC: 0.4 MG/DL (ref 0.76–1.27)
DEPRECATED RDW RBC AUTO: 47.1 FL (ref 37–54)
EOSINOPHIL # BLD AUTO: 0.29 10*3/MM3 (ref 0–0.4)
EOSINOPHIL NFR BLD AUTO: 3.1 % (ref 0.3–6.2)
ERYTHROCYTE [DISTWIDTH] IN BLOOD BY AUTOMATED COUNT: 13.7 % (ref 12.3–15.4)
GFR SERPL CREATININE-BSD FRML MDRD: >150 ML/MIN/1.73
GLOBULIN UR ELPH-MCNC: 2.2 GM/DL
GLUCOSE BLDC GLUCOMTR-MCNC: 133 MG/DL (ref 70–99)
GLUCOSE SERPL-MCNC: 80 MG/DL (ref 65–99)
HCT VFR BLD AUTO: 30 % (ref 37.5–51)
HGB BLD-MCNC: 9.7 G/DL (ref 13–17.7)
IMM GRANULOCYTES # BLD AUTO: 0.04 10*3/MM3 (ref 0–0.05)
IMM GRANULOCYTES NFR BLD AUTO: 0.4 % (ref 0–0.5)
LYMPHOCYTES # BLD AUTO: 1.79 10*3/MM3 (ref 0.7–3.1)
LYMPHOCYTES NFR BLD AUTO: 19.4 % (ref 19.6–45.3)
MAGNESIUM SERPL-MCNC: 1.4 MG/DL (ref 1.6–2.6)
MCH RBC QN AUTO: 29.9 PG (ref 26.6–33)
MCHC RBC AUTO-ENTMCNC: 32.3 G/DL (ref 31.5–35.7)
MCV RBC AUTO: 92.6 FL (ref 79–97)
MONOCYTES # BLD AUTO: 0.79 10*3/MM3 (ref 0.1–0.9)
MONOCYTES NFR BLD AUTO: 8.5 % (ref 5–12)
NEUTROPHILS NFR BLD AUTO: 6.3 10*3/MM3 (ref 1.7–7)
NEUTROPHILS NFR BLD AUTO: 68.3 % (ref 42.7–76)
NRBC BLD AUTO-RTO: 0 /100 WBC (ref 0–0.2)
PHOSPHATE SERPL-MCNC: 1.8 MG/DL (ref 2.5–4.5)
PLATELET # BLD AUTO: 199 10*3/MM3 (ref 140–450)
PMV BLD AUTO: 10.9 FL (ref 6–12)
POTASSIUM SERPL-SCNC: 3.8 MMOL/L (ref 3.5–5.2)
PROT SERPL-MCNC: 4.1 G/DL (ref 6–8.5)
RBC # BLD AUTO: 3.24 10*6/MM3 (ref 4.14–5.8)
SODIUM SERPL-SCNC: 133 MMOL/L (ref 136–145)
TRIGL SERPL-MCNC: 162 MG/DL (ref 0–150)
WBC NRBC COR # BLD: 9.24 10*3/MM3 (ref 3.4–10.8)

## 2021-11-24 PROCEDURE — 25010000002 METOCLOPRAMIDE PER 10 MG: Performed by: INTERNAL MEDICINE

## 2021-11-24 PROCEDURE — 25010000002 KETOROLAC TROMETHAMINE PER 15 MG: Performed by: SURGERY

## 2021-11-24 PROCEDURE — 02HV33Z INSERTION OF INFUSION DEVICE INTO SUPERIOR VENA CAVA, PERCUTANEOUS APPROACH: ICD-10-PCS | Performed by: INTERNAL MEDICINE

## 2021-11-24 PROCEDURE — 82962 GLUCOSE BLOOD TEST: CPT

## 2021-11-24 PROCEDURE — 84478 ASSAY OF TRIGLYCERIDES: CPT | Performed by: NURSE PRACTITIONER

## 2021-11-24 PROCEDURE — 25010000002 MAGNESIUM SULFATE 2 GM/50ML SOLUTION: Performed by: INTERNAL MEDICINE

## 2021-11-24 PROCEDURE — 85025 COMPLETE CBC W/AUTO DIFF WBC: CPT | Performed by: INTERNAL MEDICINE

## 2021-11-24 PROCEDURE — 99233 SBSQ HOSP IP/OBS HIGH 50: CPT | Performed by: INTERNAL MEDICINE

## 2021-11-24 PROCEDURE — C1751 CATH, INF, PER/CENT/MIDLINE: HCPCS

## 2021-11-24 PROCEDURE — 80053 COMPREHEN METABOLIC PANEL: CPT | Performed by: INTERNAL MEDICINE

## 2021-11-24 PROCEDURE — 25010000002 HYDROMORPHONE 1 MG/ML SOLUTION: Performed by: INTERNAL MEDICINE

## 2021-11-24 PROCEDURE — 99024 POSTOP FOLLOW-UP VISIT: CPT | Performed by: NURSE PRACTITIONER

## 2021-11-24 PROCEDURE — 84100 ASSAY OF PHOSPHORUS: CPT | Performed by: SURGERY

## 2021-11-24 PROCEDURE — 83735 ASSAY OF MAGNESIUM: CPT | Performed by: SURGERY

## 2021-11-24 RX ORDER — KETOROLAC TROMETHAMINE 30 MG/ML
30 INJECTION, SOLUTION INTRAMUSCULAR; INTRAVENOUS EVERY 6 HOURS
Status: DISCONTINUED | OUTPATIENT
Start: 2021-11-24 | End: 2021-11-26

## 2021-11-24 RX ORDER — FENTANYL/ROPIVACAINE/NS/PF 2-625MCG/1
45 PLASTIC BAG, INJECTION (ML) EPIDURAL ONCE
Status: DISCONTINUED | OUTPATIENT
Start: 2021-11-24 | End: 2021-11-25

## 2021-11-24 RX ORDER — METOCLOPRAMIDE HYDROCHLORIDE 5 MG/ML
10 INJECTION INTRAMUSCULAR; INTRAVENOUS EVERY 8 HOURS
Status: COMPLETED | OUTPATIENT
Start: 2021-11-24 | End: 2021-11-26

## 2021-11-24 RX ORDER — FENTANYL/ROPIVACAINE/NS/PF 2-625MCG/1
15 PLASTIC BAG, INJECTION (ML) EPIDURAL
Status: COMPLETED | OUTPATIENT
Start: 2021-11-24 | End: 2021-11-24

## 2021-11-24 RX ORDER — OXYCODONE HYDROCHLORIDE 5 MG/1
5 TABLET ORAL EVERY 8 HOURS PRN
Status: DISCONTINUED | OUTPATIENT
Start: 2021-11-24 | End: 2021-11-27

## 2021-11-24 RX ORDER — MAGNESIUM SULFATE HEPTAHYDRATE 40 MG/ML
2 INJECTION, SOLUTION INTRAVENOUS ONCE
Status: COMPLETED | OUTPATIENT
Start: 2021-11-24 | End: 2021-11-24

## 2021-11-24 RX ADMIN — KETOROLAC TROMETHAMINE 30 MG: 30 INJECTION, SOLUTION INTRAMUSCULAR; INTRAVENOUS at 05:39

## 2021-11-24 RX ADMIN — FAMOTIDINE 20 MG: 10 INJECTION INTRAVENOUS at 12:32

## 2021-11-24 RX ADMIN — KETOROLAC TROMETHAMINE 30 MG: 30 INJECTION, SOLUTION INTRAMUSCULAR; INTRAVENOUS at 20:25

## 2021-11-24 RX ADMIN — SODIUM CHLORIDE, POTASSIUM CHLORIDE, SODIUM LACTATE AND CALCIUM CHLORIDE 150 ML/HR: 600; 310; 30; 20 INJECTION, SOLUTION INTRAVENOUS at 12:49

## 2021-11-24 RX ADMIN — ASCORBIC ACID, VITAMIN A PALMITATE, CHOLECALCIFEROL, THIAMINE HYDROCHLORIDE, RIBOFLAVIN-5 PHOSPHATE SODIUM, PYRIDOXINE HYDROCHLORIDE, NIACINAMIDE, DEXPANTHENOL, ALPHA-TOCOPHEROL ACETATE, VITAMIN K1, FOLIC ACID, BIOTIN, CYANOCOBALAMIN: 200; 3300; 200; 6; 3.6; 6; 40; 15; 10; 150; 600; 60; 5 INJECTION, SOLUTION INTRAVENOUS at 18:34

## 2021-11-24 RX ADMIN — HYDROMORPHONE HYDROCHLORIDE 1 MG: 1 INJECTION, SOLUTION INTRAMUSCULAR; INTRAVENOUS; SUBCUTANEOUS at 16:32

## 2021-11-24 RX ADMIN — POTASSIUM PHOSPHATE, MONOBASIC AND POTASSIUM PHOSPHATE, DIBASIC 15 MMOL: 224; 236 INJECTION, SOLUTION, CONCENTRATE INTRAVENOUS at 16:31

## 2021-11-24 RX ADMIN — HYDROMORPHONE HYDROCHLORIDE 1 MG: 1 INJECTION, SOLUTION INTRAMUSCULAR; INTRAVENOUS; SUBCUTANEOUS at 12:03

## 2021-11-24 RX ADMIN — HYDROMORPHONE HYDROCHLORIDE 1 MG: 1 INJECTION, SOLUTION INTRAMUSCULAR; INTRAVENOUS; SUBCUTANEOUS at 03:27

## 2021-11-24 RX ADMIN — SODIUM CHLORIDE, POTASSIUM CHLORIDE, SODIUM LACTATE AND CALCIUM CHLORIDE 150 ML/HR: 600; 310; 30; 20 INJECTION, SOLUTION INTRAVENOUS at 20:49

## 2021-11-24 RX ADMIN — KETOROLAC TROMETHAMINE 30 MG: 30 INJECTION, SOLUTION INTRAMUSCULAR; INTRAVENOUS at 12:04

## 2021-11-24 RX ADMIN — FAMOTIDINE 20 MG: 10 INJECTION INTRAVENOUS at 20:24

## 2021-11-24 RX ADMIN — METOCLOPRAMIDE HYDROCHLORIDE 10 MG: 5 INJECTION INTRAMUSCULAR; INTRAVENOUS at 16:32

## 2021-11-24 RX ADMIN — MAGNESIUM SULFATE 2 G: 2 INJECTION INTRAVENOUS at 12:38

## 2021-11-24 RX ADMIN — POTASSIUM PHOSPHATE, MONOBASIC AND POTASSIUM PHOSPHATE, DIBASIC 15 MMOL: 224; 236 INJECTION, SOLUTION, CONCENTRATE INTRAVENOUS at 20:49

## 2021-11-24 RX ADMIN — HYDROMORPHONE HYDROCHLORIDE 1 MG: 1 INJECTION, SOLUTION INTRAMUSCULAR; INTRAVENOUS; SUBCUTANEOUS at 01:15

## 2021-11-24 RX ADMIN — SODIUM CHLORIDE, POTASSIUM CHLORIDE, SODIUM LACTATE AND CALCIUM CHLORIDE 150 ML/HR: 600; 310; 30; 20 INJECTION, SOLUTION INTRAVENOUS at 05:42

## 2021-11-24 RX ADMIN — METOCLOPRAMIDE HYDROCHLORIDE 10 MG: 5 INJECTION INTRAMUSCULAR; INTRAVENOUS at 23:43

## 2021-11-24 RX ADMIN — SODIUM CHLORIDE, PRESERVATIVE FREE 10 ML: 5 INJECTION INTRAVENOUS at 20:25

## 2021-11-24 RX ADMIN — HYDROMORPHONE HYDROCHLORIDE 1 MG: 1 INJECTION, SOLUTION INTRAMUSCULAR; INTRAVENOUS; SUBCUTANEOUS at 20:24

## 2021-11-24 RX ADMIN — POTASSIUM PHOSPHATE, MONOBASIC AND POTASSIUM PHOSPHATE, DIBASIC 15 MMOL: 224; 236 INJECTION, SOLUTION, CONCENTRATE INTRAVENOUS at 13:30

## 2021-11-24 RX ADMIN — SODIUM CHLORIDE, POTASSIUM CHLORIDE, SODIUM LACTATE AND CALCIUM CHLORIDE 150 ML/HR: 600; 310; 30; 20 INJECTION, SOLUTION INTRAVENOUS at 00:03

## 2021-11-24 RX ADMIN — METOCLOPRAMIDE HYDROCHLORIDE 10 MG: 5 INJECTION INTRAMUSCULAR; INTRAVENOUS at 12:38

## 2021-11-24 RX ADMIN — HYDROMORPHONE HYDROCHLORIDE 1 MG: 1 INJECTION, SOLUTION INTRAMUSCULAR; INTRAVENOUS; SUBCUTANEOUS at 23:57

## 2021-11-25 ENCOUNTER — APPOINTMENT (OUTPATIENT)
Dept: GENERAL RADIOLOGY | Facility: HOSPITAL | Age: 41
End: 2021-11-25

## 2021-11-25 LAB
ALBUMIN SERPL-MCNC: 3 G/DL (ref 3.5–5.2)
ALBUMIN/GLOB SERPL: 1 G/DL
ALP SERPL-CCNC: 257 U/L (ref 39–117)
ALT SERPL W P-5'-P-CCNC: 71 U/L (ref 1–41)
ANION GAP SERPL CALCULATED.3IONS-SCNC: 9.5 MMOL/L (ref 5–15)
AST SERPL-CCNC: 54 U/L (ref 1–40)
BASOPHILS # BLD AUTO: 0.04 10*3/MM3 (ref 0–0.2)
BASOPHILS NFR BLD AUTO: 0.5 % (ref 0–1.5)
BILIRUB SERPL-MCNC: 1.5 MG/DL (ref 0–1.2)
BUN SERPL-MCNC: 9 MG/DL (ref 6–20)
BUN/CREAT SERPL: 15.3 (ref 7–25)
CALCIUM SPEC-SCNC: 8.5 MG/DL (ref 8.6–10.5)
CHLORIDE SERPL-SCNC: 102 MMOL/L (ref 98–107)
CO2 SERPL-SCNC: 23.5 MMOL/L (ref 22–29)
CREAT SERPL-MCNC: 0.59 MG/DL (ref 0.76–1.27)
DEPRECATED RDW RBC AUTO: 46.2 FL (ref 37–54)
EOSINOPHIL # BLD AUTO: 0.2 10*3/MM3 (ref 0–0.4)
EOSINOPHIL NFR BLD AUTO: 2.4 % (ref 0.3–6.2)
ERYTHROCYTE [DISTWIDTH] IN BLOOD BY AUTOMATED COUNT: 13.7 % (ref 12.3–15.4)
GFR SERPL CREATININE-BSD FRML MDRD: >150 ML/MIN/1.73
GLOBULIN UR ELPH-MCNC: 3.1 GM/DL
GLUCOSE BLDC GLUCOMTR-MCNC: 112 MG/DL (ref 70–99)
GLUCOSE BLDC GLUCOMTR-MCNC: 138 MG/DL (ref 70–99)
GLUCOSE BLDC GLUCOMTR-MCNC: 138 MG/DL (ref 70–99)
GLUCOSE SERPL-MCNC: 140 MG/DL (ref 65–99)
HCT VFR BLD AUTO: 33.1 % (ref 37.5–51)
HGB BLD-MCNC: 10.9 G/DL (ref 13–17.7)
IMM GRANULOCYTES # BLD AUTO: 0.05 10*3/MM3 (ref 0–0.05)
IMM GRANULOCYTES NFR BLD AUTO: 0.6 % (ref 0–0.5)
LYMPHOCYTES # BLD AUTO: 1.75 10*3/MM3 (ref 0.7–3.1)
LYMPHOCYTES NFR BLD AUTO: 20.6 % (ref 19.6–45.3)
MAGNESIUM SERPL-MCNC: 2.3 MG/DL (ref 1.6–2.6)
MCH RBC QN AUTO: 29.9 PG (ref 26.6–33)
MCHC RBC AUTO-ENTMCNC: 32.9 G/DL (ref 31.5–35.7)
MCV RBC AUTO: 90.9 FL (ref 79–97)
MONOCYTES # BLD AUTO: 0.85 10*3/MM3 (ref 0.1–0.9)
MONOCYTES NFR BLD AUTO: 10 % (ref 5–12)
NEUTROPHILS NFR BLD AUTO: 5.62 10*3/MM3 (ref 1.7–7)
NEUTROPHILS NFR BLD AUTO: 65.9 % (ref 42.7–76)
NRBC BLD AUTO-RTO: 0 /100 WBC (ref 0–0.2)
PHOSPHATE SERPL-MCNC: 4.3 MG/DL (ref 2.5–4.5)
PLATELET # BLD AUTO: 266 10*3/MM3 (ref 140–450)
PMV BLD AUTO: 9.9 FL (ref 6–12)
POTASSIUM SERPL-SCNC: 3.8 MMOL/L (ref 3.5–5.2)
PROT SERPL-MCNC: 6.1 G/DL (ref 6–8.5)
RBC # BLD AUTO: 3.64 10*6/MM3 (ref 4.14–5.8)
SODIUM SERPL-SCNC: 135 MMOL/L (ref 136–145)
WBC NRBC COR # BLD: 8.51 10*3/MM3 (ref 3.4–10.8)

## 2021-11-25 PROCEDURE — 84100 ASSAY OF PHOSPHORUS: CPT | Performed by: INTERNAL MEDICINE

## 2021-11-25 PROCEDURE — 25010000002 HYDROMORPHONE 1 MG/ML SOLUTION: Performed by: SURGERY

## 2021-11-25 PROCEDURE — 99024 POSTOP FOLLOW-UP VISIT: CPT | Performed by: SURGERY

## 2021-11-25 PROCEDURE — 25010000002 METOCLOPRAMIDE PER 10 MG: Performed by: INTERNAL MEDICINE

## 2021-11-25 PROCEDURE — 80053 COMPREHEN METABOLIC PANEL: CPT | Performed by: INTERNAL MEDICINE

## 2021-11-25 PROCEDURE — 82962 GLUCOSE BLOOD TEST: CPT

## 2021-11-25 PROCEDURE — 83735 ASSAY OF MAGNESIUM: CPT | Performed by: INTERNAL MEDICINE

## 2021-11-25 PROCEDURE — 99233 SBSQ HOSP IP/OBS HIGH 50: CPT | Performed by: INTERNAL MEDICINE

## 2021-11-25 PROCEDURE — 25010000002 HYDRALAZINE PER 20 MG: Performed by: INTERNAL MEDICINE

## 2021-11-25 PROCEDURE — 85025 COMPLETE CBC W/AUTO DIFF WBC: CPT | Performed by: INTERNAL MEDICINE

## 2021-11-25 PROCEDURE — 74018 RADEX ABDOMEN 1 VIEW: CPT

## 2021-11-25 PROCEDURE — 25010000002 KETOROLAC TROMETHAMINE PER 15 MG: Performed by: SURGERY

## 2021-11-25 PROCEDURE — 25010000002 HYDROMORPHONE 1 MG/ML SOLUTION: Performed by: INTERNAL MEDICINE

## 2021-11-25 RX ORDER — VALSARTAN 80 MG/1
160 TABLET ORAL
Status: DISCONTINUED | OUTPATIENT
Start: 2021-11-25 | End: 2021-11-28 | Stop reason: HOSPADM

## 2021-11-25 RX ORDER — FAMOTIDINE 20 MG/1
20 TABLET, FILM COATED ORAL NIGHTLY
Status: DISCONTINUED | OUTPATIENT
Start: 2021-11-25 | End: 2021-11-28 | Stop reason: HOSPADM

## 2021-11-25 RX ORDER — HYDRALAZINE HYDROCHLORIDE 20 MG/ML
10 INJECTION INTRAMUSCULAR; INTRAVENOUS EVERY 6 HOURS PRN
Status: DISCONTINUED | OUTPATIENT
Start: 2021-11-25 | End: 2021-11-28 | Stop reason: HOSPADM

## 2021-11-25 RX ADMIN — KETOROLAC TROMETHAMINE 30 MG: 30 INJECTION, SOLUTION INTRAMUSCULAR; INTRAVENOUS at 20:55

## 2021-11-25 RX ADMIN — VALSARTAN 160 MG: 80 TABLET, FILM COATED ORAL at 16:22

## 2021-11-25 RX ADMIN — KETOROLAC TROMETHAMINE 30 MG: 30 INJECTION, SOLUTION INTRAMUSCULAR; INTRAVENOUS at 13:48

## 2021-11-25 RX ADMIN — ASCORBIC ACID, VITAMIN A PALMITATE, CHOLECALCIFEROL, THIAMINE HYDROCHLORIDE, RIBOFLAVIN-5 PHOSPHATE SODIUM, PYRIDOXINE HYDROCHLORIDE, NIACINAMIDE, DEXPANTHENOL, ALPHA-TOCOPHEROL ACETATE, VITAMIN K1, FOLIC ACID, BIOTIN, CYANOCOBALAMIN: 200; 3300; 200; 6; 3.6; 6; 40; 15; 10; 150; 600; 60; 5 INJECTION, SOLUTION INTRAVENOUS at 18:28

## 2021-11-25 RX ADMIN — KETOROLAC TROMETHAMINE 30 MG: 30 INJECTION, SOLUTION INTRAMUSCULAR; INTRAVENOUS at 03:24

## 2021-11-25 RX ADMIN — SODIUM CHLORIDE, POTASSIUM CHLORIDE, SODIUM LACTATE AND CALCIUM CHLORIDE 150 ML/HR: 600; 310; 30; 20 INJECTION, SOLUTION INTRAVENOUS at 03:44

## 2021-11-25 RX ADMIN — METOCLOPRAMIDE HYDROCHLORIDE 10 MG: 5 INJECTION INTRAMUSCULAR; INTRAVENOUS at 10:09

## 2021-11-25 RX ADMIN — MAGNESIUM HYDROXIDE 15 ML: 2400 SUSPENSION ORAL at 10:09

## 2021-11-25 RX ADMIN — METOCLOPRAMIDE HYDROCHLORIDE 10 MG: 5 INJECTION INTRAMUSCULAR; INTRAVENOUS at 16:22

## 2021-11-25 RX ADMIN — HYDROMORPHONE HYDROCHLORIDE 1 MG: 1 INJECTION, SOLUTION INTRAMUSCULAR; INTRAVENOUS; SUBCUTANEOUS at 07:06

## 2021-11-25 RX ADMIN — HYDROMORPHONE HYDROCHLORIDE 0.5 MG: 1 INJECTION, SOLUTION INTRAMUSCULAR; INTRAVENOUS; SUBCUTANEOUS at 11:16

## 2021-11-25 RX ADMIN — FAMOTIDINE 20 MG: 20 TABLET, FILM COATED ORAL at 20:54

## 2021-11-25 RX ADMIN — HYDROMORPHONE HYDROCHLORIDE 1 MG: 1 INJECTION, SOLUTION INTRAMUSCULAR; INTRAVENOUS; SUBCUTANEOUS at 03:44

## 2021-11-25 RX ADMIN — SODIUM CHLORIDE, PRESERVATIVE FREE 10 ML: 5 INJECTION INTRAVENOUS at 20:55

## 2021-11-25 RX ADMIN — I.V. FAT EMULSION 50 G: 20 EMULSION INTRAVENOUS at 10:09

## 2021-11-25 RX ADMIN — KETOROLAC TROMETHAMINE 30 MG: 30 INJECTION, SOLUTION INTRAMUSCULAR; INTRAVENOUS at 10:09

## 2021-11-25 RX ADMIN — SODIUM CHLORIDE, PRESERVATIVE FREE 10 ML: 5 INJECTION INTRAVENOUS at 10:10

## 2021-11-25 RX ADMIN — HYDROMORPHONE HYDROCHLORIDE 0.5 MG: 1 INJECTION, SOLUTION INTRAMUSCULAR; INTRAVENOUS; SUBCUTANEOUS at 16:23

## 2021-11-25 RX ADMIN — HYDRALAZINE HYDROCHLORIDE 10 MG: 20 INJECTION INTRAMUSCULAR; INTRAVENOUS at 23:52

## 2021-11-25 RX ADMIN — FAMOTIDINE 20 MG: 10 INJECTION INTRAVENOUS at 10:09

## 2021-11-25 RX ADMIN — HYDROMORPHONE HYDROCHLORIDE 0.5 MG: 1 INJECTION, SOLUTION INTRAMUSCULAR; INTRAVENOUS; SUBCUTANEOUS at 22:37

## 2021-11-25 RX ADMIN — HYDROMORPHONE HYDROCHLORIDE 0.5 MG: 1 INJECTION, SOLUTION INTRAMUSCULAR; INTRAVENOUS; SUBCUTANEOUS at 18:39

## 2021-11-25 RX ADMIN — HYDROMORPHONE HYDROCHLORIDE 0.5 MG: 1 INJECTION, SOLUTION INTRAMUSCULAR; INTRAVENOUS; SUBCUTANEOUS at 13:48

## 2021-11-26 ENCOUNTER — APPOINTMENT (OUTPATIENT)
Dept: GENERAL RADIOLOGY | Facility: HOSPITAL | Age: 41
End: 2021-11-26

## 2021-11-26 LAB
ALBUMIN SERPL-MCNC: 3.2 G/DL (ref 3.5–5.2)
ALBUMIN/GLOB SERPL: 0.9 G/DL
ALP SERPL-CCNC: 258 U/L (ref 39–117)
ALT SERPL W P-5'-P-CCNC: 78 U/L (ref 1–41)
ANION GAP SERPL CALCULATED.3IONS-SCNC: 11.8 MMOL/L (ref 5–15)
AST SERPL-CCNC: 56 U/L (ref 1–40)
BASOPHILS # BLD AUTO: 0.06 10*3/MM3 (ref 0–0.2)
BASOPHILS NFR BLD AUTO: 0.5 % (ref 0–1.5)
BILIRUB SERPL-MCNC: 1.4 MG/DL (ref 0–1.2)
BILIRUB UR QL STRIP: NEGATIVE
BUN SERPL-MCNC: 9 MG/DL (ref 6–20)
BUN/CREAT SERPL: 14.8 (ref 7–25)
CALCIUM SPEC-SCNC: 9 MG/DL (ref 8.6–10.5)
CHLORIDE SERPL-SCNC: 103 MMOL/L (ref 98–107)
CLARITY UR: CLEAR
CO2 SERPL-SCNC: 22.2 MMOL/L (ref 22–29)
COLOR UR: ABNORMAL
CREAT SERPL-MCNC: 0.61 MG/DL (ref 0.76–1.27)
D-LACTATE SERPL-SCNC: 0.9 MMOL/L (ref 0.5–2)
DEPRECATED RDW RBC AUTO: 47 FL (ref 37–54)
EOSINOPHIL # BLD AUTO: 0.22 10*3/MM3 (ref 0–0.4)
EOSINOPHIL NFR BLD AUTO: 2 % (ref 0.3–6.2)
ERYTHROCYTE [DISTWIDTH] IN BLOOD BY AUTOMATED COUNT: 13.9 % (ref 12.3–15.4)
GFR SERPL CREATININE-BSD FRML MDRD: 146 ML/MIN/1.73
GLOBULIN UR ELPH-MCNC: 3.5 GM/DL
GLUCOSE BLDC GLUCOMTR-MCNC: 115 MG/DL (ref 70–99)
GLUCOSE BLDC GLUCOMTR-MCNC: 131 MG/DL (ref 70–99)
GLUCOSE BLDC GLUCOMTR-MCNC: 139 MG/DL (ref 70–99)
GLUCOSE BLDC GLUCOMTR-MCNC: 147 MG/DL (ref 70–99)
GLUCOSE SERPL-MCNC: 130 MG/DL (ref 65–99)
GLUCOSE UR STRIP-MCNC: ABNORMAL MG/DL
HCT VFR BLD AUTO: 36.2 % (ref 37.5–51)
HGB BLD-MCNC: 12 G/DL (ref 13–17.7)
HGB UR QL STRIP.AUTO: NEGATIVE
IMM GRANULOCYTES # BLD AUTO: 0.14 10*3/MM3 (ref 0–0.05)
IMM GRANULOCYTES NFR BLD AUTO: 1.2 % (ref 0–0.5)
KETONES UR QL STRIP: NEGATIVE
LEUKOCYTE ESTERASE UR QL STRIP.AUTO: NEGATIVE
LYMPHOCYTES # BLD AUTO: 1.89 10*3/MM3 (ref 0.7–3.1)
LYMPHOCYTES NFR BLD AUTO: 16.8 % (ref 19.6–45.3)
MAGNESIUM SERPL-MCNC: 2.4 MG/DL (ref 1.6–2.6)
MCH RBC QN AUTO: 30.5 PG (ref 26.6–33)
MCHC RBC AUTO-ENTMCNC: 33.1 G/DL (ref 31.5–35.7)
MCV RBC AUTO: 91.9 FL (ref 79–97)
MONOCYTES # BLD AUTO: 1.22 10*3/MM3 (ref 0.1–0.9)
MONOCYTES NFR BLD AUTO: 10.9 % (ref 5–12)
NEUTROPHILS NFR BLD AUTO: 68.6 % (ref 42.7–76)
NEUTROPHILS NFR BLD AUTO: 7.69 10*3/MM3 (ref 1.7–7)
NITRITE UR QL STRIP: NEGATIVE
NRBC BLD AUTO-RTO: 0 /100 WBC (ref 0–0.2)
PH UR STRIP.AUTO: 7.5 [PH] (ref 5–8)
PHOSPHATE SERPL-MCNC: 3.4 MG/DL (ref 2.5–4.5)
PLATELET # BLD AUTO: 334 10*3/MM3 (ref 140–450)
PMV BLD AUTO: 9.9 FL (ref 6–12)
POTASSIUM SERPL-SCNC: 4.2 MMOL/L (ref 3.5–5.2)
PROCALCITONIN SERPL-MCNC: 0.24 NG/ML (ref 0–0.25)
PROT SERPL-MCNC: 6.7 G/DL (ref 6–8.5)
PROT UR QL STRIP: ABNORMAL
RBC # BLD AUTO: 3.94 10*6/MM3 (ref 4.14–5.8)
SODIUM SERPL-SCNC: 137 MMOL/L (ref 136–145)
SP GR UR STRIP: 1.01 (ref 1–1.03)
TRIGL SERPL-MCNC: 157 MG/DL (ref 0–150)
UROBILINOGEN UR QL STRIP: ABNORMAL
WBC NRBC COR # BLD: 11.22 10*3/MM3 (ref 3.4–10.8)

## 2021-11-26 PROCEDURE — 84478 ASSAY OF TRIGLYCERIDES: CPT | Performed by: INTERNAL MEDICINE

## 2021-11-26 PROCEDURE — 25010000002 HYDROMORPHONE 1 MG/ML SOLUTION: Performed by: SURGERY

## 2021-11-26 PROCEDURE — 25010000002 KETOROLAC TROMETHAMINE PER 15 MG: Performed by: SURGERY

## 2021-11-26 PROCEDURE — 83735 ASSAY OF MAGNESIUM: CPT | Performed by: SURGERY

## 2021-11-26 PROCEDURE — 82962 GLUCOSE BLOOD TEST: CPT

## 2021-11-26 PROCEDURE — 85025 COMPLETE CBC W/AUTO DIFF WBC: CPT | Performed by: INTERNAL MEDICINE

## 2021-11-26 PROCEDURE — 83605 ASSAY OF LACTIC ACID: CPT | Performed by: INTERNAL MEDICINE

## 2021-11-26 PROCEDURE — 84145 PROCALCITONIN (PCT): CPT | Performed by: INTERNAL MEDICINE

## 2021-11-26 PROCEDURE — 25010000002 METOCLOPRAMIDE PER 10 MG: Performed by: INTERNAL MEDICINE

## 2021-11-26 PROCEDURE — 80053 COMPREHEN METABOLIC PANEL: CPT | Performed by: INTERNAL MEDICINE

## 2021-11-26 PROCEDURE — 84100 ASSAY OF PHOSPHORUS: CPT | Performed by: SURGERY

## 2021-11-26 PROCEDURE — 81003 URINALYSIS AUTO W/O SCOPE: CPT | Performed by: INTERNAL MEDICINE

## 2021-11-26 PROCEDURE — 71045 X-RAY EXAM CHEST 1 VIEW: CPT

## 2021-11-26 PROCEDURE — 99233 SBSQ HOSP IP/OBS HIGH 50: CPT | Performed by: INTERNAL MEDICINE

## 2021-11-26 PROCEDURE — 99024 POSTOP FOLLOW-UP VISIT: CPT | Performed by: SURGERY

## 2021-11-26 RX ADMIN — I.V. FAT EMULSION 100 G: 20 EMULSION INTRAVENOUS at 08:30

## 2021-11-26 RX ADMIN — LABETALOL 20 MG/4 ML (5 MG/ML) INTRAVENOUS SYRINGE 10 MG: at 11:13

## 2021-11-26 RX ADMIN — VALSARTAN 160 MG: 80 TABLET, FILM COATED ORAL at 08:31

## 2021-11-26 RX ADMIN — SODIUM CHLORIDE, PRESERVATIVE FREE 10 ML: 5 INJECTION INTRAVENOUS at 20:28

## 2021-11-26 RX ADMIN — HYDROMORPHONE HYDROCHLORIDE 0.5 MG: 1 INJECTION, SOLUTION INTRAMUSCULAR; INTRAVENOUS; SUBCUTANEOUS at 00:47

## 2021-11-26 RX ADMIN — KETOROLAC TROMETHAMINE 30 MG: 30 INJECTION, SOLUTION INTRAMUSCULAR; INTRAVENOUS at 08:31

## 2021-11-26 RX ADMIN — OXYCODONE HYDROCHLORIDE 5 MG: 5 TABLET ORAL at 10:45

## 2021-11-26 RX ADMIN — HYDROMORPHONE HYDROCHLORIDE 0.5 MG: 1 INJECTION, SOLUTION INTRAMUSCULAR; INTRAVENOUS; SUBCUTANEOUS at 18:03

## 2021-11-26 RX ADMIN — HYDROMORPHONE HYDROCHLORIDE 0.5 MG: 1 INJECTION, SOLUTION INTRAMUSCULAR; INTRAVENOUS; SUBCUTANEOUS at 20:28

## 2021-11-26 RX ADMIN — FAMOTIDINE 20 MG: 20 TABLET, FILM COATED ORAL at 20:27

## 2021-11-26 RX ADMIN — SODIUM CHLORIDE, PRESERVATIVE FREE 10 ML: 5 INJECTION INTRAVENOUS at 08:31

## 2021-11-26 RX ADMIN — HYDROMORPHONE HYDROCHLORIDE 0.5 MG: 1 INJECTION, SOLUTION INTRAMUSCULAR; INTRAVENOUS; SUBCUTANEOUS at 14:58

## 2021-11-26 RX ADMIN — METOCLOPRAMIDE HYDROCHLORIDE 10 MG: 5 INJECTION INTRAMUSCULAR; INTRAVENOUS at 00:37

## 2021-11-26 RX ADMIN — HYDROMORPHONE HYDROCHLORIDE 0.5 MG: 1 INJECTION, SOLUTION INTRAMUSCULAR; INTRAVENOUS; SUBCUTANEOUS at 11:13

## 2021-11-26 RX ADMIN — KETOROLAC TROMETHAMINE 30 MG: 30 INJECTION, SOLUTION INTRAMUSCULAR; INTRAVENOUS at 03:36

## 2021-11-26 RX ADMIN — HYDROMORPHONE HYDROCHLORIDE 0.5 MG: 1 INJECTION, SOLUTION INTRAMUSCULAR; INTRAVENOUS; SUBCUTANEOUS at 03:37

## 2021-11-26 RX ADMIN — OXYCODONE HYDROCHLORIDE 5 MG: 5 TABLET ORAL at 23:24

## 2021-11-26 RX ADMIN — ASCORBIC ACID, VITAMIN A PALMITATE, CHOLECALCIFEROL, THIAMINE HYDROCHLORIDE, RIBOFLAVIN-5 PHOSPHATE SODIUM, PYRIDOXINE HYDROCHLORIDE, NIACINAMIDE, DEXPANTHENOL, ALPHA-TOCOPHEROL ACETATE, VITAMIN K1, FOLIC ACID, BIOTIN, CYANOCOBALAMIN: 200; 3300; 200; 6; 3.6; 6; 40; 15; 10; 150; 600; 60; 5 INJECTION, SOLUTION INTRAVENOUS at 18:25

## 2021-11-27 LAB
ALBUMIN SERPL-MCNC: 3.2 G/DL (ref 3.5–5.2)
ALBUMIN/GLOB SERPL: 0.9 G/DL
ALP SERPL-CCNC: 254 U/L (ref 39–117)
ALT SERPL W P-5'-P-CCNC: 102 U/L (ref 1–41)
ANION GAP SERPL CALCULATED.3IONS-SCNC: 11.8 MMOL/L (ref 5–15)
AST SERPL-CCNC: 72 U/L (ref 1–40)
BASOPHILS # BLD AUTO: 0.08 10*3/MM3 (ref 0–0.2)
BASOPHILS NFR BLD AUTO: 0.7 % (ref 0–1.5)
BILIRUB SERPL-MCNC: 1 MG/DL (ref 0–1.2)
BUN SERPL-MCNC: 8 MG/DL (ref 6–20)
BUN/CREAT SERPL: 13.3 (ref 7–25)
CALCIUM SPEC-SCNC: 8.4 MG/DL (ref 8.6–10.5)
CHLORIDE SERPL-SCNC: 104 MMOL/L (ref 98–107)
CO2 SERPL-SCNC: 21.2 MMOL/L (ref 22–29)
CREAT SERPL-MCNC: 0.6 MG/DL (ref 0.76–1.27)
DEPRECATED RDW RBC AUTO: 45.4 FL (ref 37–54)
EOSINOPHIL # BLD AUTO: 0.28 10*3/MM3 (ref 0–0.4)
EOSINOPHIL NFR BLD AUTO: 2.4 % (ref 0.3–6.2)
ERYTHROCYTE [DISTWIDTH] IN BLOOD BY AUTOMATED COUNT: 14 % (ref 12.3–15.4)
GFR SERPL CREATININE-BSD FRML MDRD: 148 ML/MIN/1.73
GLOBULIN UR ELPH-MCNC: 3.5 GM/DL
GLUCOSE SERPL-MCNC: 118 MG/DL (ref 65–99)
HCT VFR BLD AUTO: 35.2 % (ref 37.5–51)
HGB BLD-MCNC: 11.8 G/DL (ref 13–17.7)
IMM GRANULOCYTES # BLD AUTO: 0.28 10*3/MM3 (ref 0–0.05)
IMM GRANULOCYTES NFR BLD AUTO: 2.4 % (ref 0–0.5)
LYMPHOCYTES # BLD AUTO: 2.39 10*3/MM3 (ref 0.7–3.1)
LYMPHOCYTES NFR BLD AUTO: 20.3 % (ref 19.6–45.3)
MAGNESIUM SERPL-MCNC: 2.4 MG/DL (ref 1.6–2.6)
MCH RBC QN AUTO: 29.9 PG (ref 26.6–33)
MCHC RBC AUTO-ENTMCNC: 33.5 G/DL (ref 31.5–35.7)
MCV RBC AUTO: 89.3 FL (ref 79–97)
MONOCYTES # BLD AUTO: 1.4 10*3/MM3 (ref 0.1–0.9)
MONOCYTES NFR BLD AUTO: 11.9 % (ref 5–12)
NEUTROPHILS NFR BLD AUTO: 62.3 % (ref 42.7–76)
NEUTROPHILS NFR BLD AUTO: 7.33 10*3/MM3 (ref 1.7–7)
NRBC BLD AUTO-RTO: 0 /100 WBC (ref 0–0.2)
PHOSPHATE SERPL-MCNC: 4.2 MG/DL (ref 2.5–4.5)
PLATELET # BLD AUTO: 327 10*3/MM3 (ref 140–450)
PMV BLD AUTO: 9.9 FL (ref 6–12)
POTASSIUM SERPL-SCNC: 4.1 MMOL/L (ref 3.5–5.2)
PROT SERPL-MCNC: 6.7 G/DL (ref 6–8.5)
RBC # BLD AUTO: 3.94 10*6/MM3 (ref 4.14–5.8)
SODIUM SERPL-SCNC: 137 MMOL/L (ref 136–145)
WBC NRBC COR # BLD: 11.76 10*3/MM3 (ref 3.4–10.8)

## 2021-11-27 PROCEDURE — 84100 ASSAY OF PHOSPHORUS: CPT | Performed by: INTERNAL MEDICINE

## 2021-11-27 PROCEDURE — 99232 SBSQ HOSP IP/OBS MODERATE 35: CPT | Performed by: INTERNAL MEDICINE

## 2021-11-27 PROCEDURE — 85025 COMPLETE CBC W/AUTO DIFF WBC: CPT | Performed by: INTERNAL MEDICINE

## 2021-11-27 PROCEDURE — 99024 POSTOP FOLLOW-UP VISIT: CPT | Performed by: SURGERY

## 2021-11-27 PROCEDURE — 80053 COMPREHEN METABOLIC PANEL: CPT | Performed by: INTERNAL MEDICINE

## 2021-11-27 PROCEDURE — 83735 ASSAY OF MAGNESIUM: CPT | Performed by: INTERNAL MEDICINE

## 2021-11-27 PROCEDURE — 25010000002 KETOROLAC TROMETHAMINE PER 15 MG: Performed by: SURGERY

## 2021-11-27 PROCEDURE — 25010000002 HYDROMORPHONE 1 MG/ML SOLUTION: Performed by: SURGERY

## 2021-11-27 RX ORDER — KETOROLAC TROMETHAMINE 30 MG/ML
30 INJECTION, SOLUTION INTRAMUSCULAR; INTRAVENOUS EVERY 4 HOURS PRN
Status: DISCONTINUED | OUTPATIENT
Start: 2021-11-27 | End: 2021-11-28 | Stop reason: HOSPADM

## 2021-11-27 RX ORDER — OXYCODONE HYDROCHLORIDE 5 MG/1
5 TABLET ORAL EVERY 8 HOURS PRN
Status: DISCONTINUED | OUTPATIENT
Start: 2021-11-27 | End: 2021-11-28 | Stop reason: HOSPADM

## 2021-11-27 RX ORDER — OXYCODONE HYDROCHLORIDE 5 MG/1
10 TABLET ORAL EVERY 6 HOURS PRN
Status: DISCONTINUED | OUTPATIENT
Start: 2021-11-27 | End: 2021-11-28 | Stop reason: HOSPADM

## 2021-11-27 RX ADMIN — SODIUM CHLORIDE, PRESERVATIVE FREE 10 ML: 5 INJECTION INTRAVENOUS at 08:50

## 2021-11-27 RX ADMIN — I.V. FAT EMULSION 100 G: 20 EMULSION INTRAVENOUS at 08:50

## 2021-11-27 RX ADMIN — OXYCODONE HYDROCHLORIDE 10 MG: 5 TABLET ORAL at 12:14

## 2021-11-27 RX ADMIN — HYDROMORPHONE HYDROCHLORIDE 0.5 MG: 1 INJECTION, SOLUTION INTRAMUSCULAR; INTRAVENOUS; SUBCUTANEOUS at 01:15

## 2021-11-27 RX ADMIN — KETOROLAC TROMETHAMINE 30 MG: 30 INJECTION, SOLUTION INTRAMUSCULAR; INTRAVENOUS at 16:59

## 2021-11-27 RX ADMIN — FAMOTIDINE 20 MG: 20 TABLET, FILM COATED ORAL at 20:32

## 2021-11-27 RX ADMIN — KETOROLAC TROMETHAMINE 30 MG: 30 INJECTION, SOLUTION INTRAMUSCULAR; INTRAVENOUS at 12:14

## 2021-11-27 RX ADMIN — OXYCODONE HYDROCHLORIDE 10 MG: 5 TABLET ORAL at 18:06

## 2021-11-27 RX ADMIN — HYDROMORPHONE HYDROCHLORIDE 0.5 MG: 1 INJECTION, SOLUTION INTRAMUSCULAR; INTRAVENOUS; SUBCUTANEOUS at 08:55

## 2021-11-27 RX ADMIN — HYDROMORPHONE HYDROCHLORIDE 0.5 MG: 1 INJECTION, SOLUTION INTRAMUSCULAR; INTRAVENOUS; SUBCUTANEOUS at 06:07

## 2021-11-27 RX ADMIN — VALSARTAN 160 MG: 80 TABLET, FILM COATED ORAL at 08:48

## 2021-11-27 RX ADMIN — SODIUM CHLORIDE, PRESERVATIVE FREE 10 ML: 5 INJECTION INTRAVENOUS at 20:32

## 2021-11-28 VITALS
RESPIRATION RATE: 18 BRPM | TEMPERATURE: 98.6 F | HEART RATE: 81 BPM | BODY MASS INDEX: 24.44 KG/M2 | OXYGEN SATURATION: 98 % | DIASTOLIC BLOOD PRESSURE: 76 MMHG | SYSTOLIC BLOOD PRESSURE: 114 MMHG | WEIGHT: 174.6 LBS | HEIGHT: 71 IN

## 2021-11-28 PROBLEM — Z98.890 S/P EXPLORATORY LAPAROTOMY: Status: RESOLVED | Noted: 2021-11-22 | Resolved: 2021-11-28

## 2021-11-28 PROBLEM — Z90.49 S/P APPENDECTOMY: Status: RESOLVED | Noted: 2021-11-22 | Resolved: 2021-11-28

## 2021-11-28 LAB
BASOPHILS # BLD AUTO: 0.07 10*3/MM3 (ref 0–0.2)
BASOPHILS NFR BLD AUTO: 0.6 % (ref 0–1.5)
DEPRECATED RDW RBC AUTO: 47.7 FL (ref 37–54)
EOSINOPHIL # BLD AUTO: 0.33 10*3/MM3 (ref 0–0.4)
EOSINOPHIL NFR BLD AUTO: 3 % (ref 0.3–6.2)
ERYTHROCYTE [DISTWIDTH] IN BLOOD BY AUTOMATED COUNT: 14.2 % (ref 12.3–15.4)
HCT VFR BLD AUTO: 37.2 % (ref 37.5–51)
HGB BLD-MCNC: 12.2 G/DL (ref 13–17.7)
IMM GRANULOCYTES # BLD AUTO: 0.25 10*3/MM3 (ref 0–0.05)
IMM GRANULOCYTES NFR BLD AUTO: 2.3 % (ref 0–0.5)
LYMPHOCYTES # BLD AUTO: 2.69 10*3/MM3 (ref 0.7–3.1)
LYMPHOCYTES NFR BLD AUTO: 24.9 % (ref 19.6–45.3)
MCH RBC QN AUTO: 30 PG (ref 26.6–33)
MCHC RBC AUTO-ENTMCNC: 32.8 G/DL (ref 31.5–35.7)
MCV RBC AUTO: 91.4 FL (ref 79–97)
MONOCYTES # BLD AUTO: 1.34 10*3/MM3 (ref 0.1–0.9)
MONOCYTES NFR BLD AUTO: 12.4 % (ref 5–12)
NEUTROPHILS NFR BLD AUTO: 56.8 % (ref 42.7–76)
NEUTROPHILS NFR BLD AUTO: 6.14 10*3/MM3 (ref 1.7–7)
NRBC BLD AUTO-RTO: 0 /100 WBC (ref 0–0.2)
PLATELET # BLD AUTO: 362 10*3/MM3 (ref 140–450)
PMV BLD AUTO: 9.9 FL (ref 6–12)
RBC # BLD AUTO: 4.07 10*6/MM3 (ref 4.14–5.8)
WBC NRBC COR # BLD: 10.82 10*3/MM3 (ref 3.4–10.8)

## 2021-11-28 PROCEDURE — 85025 COMPLETE CBC W/AUTO DIFF WBC: CPT | Performed by: INTERNAL MEDICINE

## 2021-11-28 PROCEDURE — 99238 HOSP IP/OBS DSCHRG MGMT 30/<: CPT | Performed by: INTERNAL MEDICINE

## 2021-11-28 PROCEDURE — 99024 POSTOP FOLLOW-UP VISIT: CPT | Performed by: SURGERY

## 2021-11-28 RX ORDER — OXYCODONE HYDROCHLORIDE AND ACETAMINOPHEN 5; 325 MG/1; MG/1
1-2 TABLET ORAL EVERY 4 HOURS PRN
Qty: 20 TABLET | Refills: 0 | Status: SHIPPED | OUTPATIENT
Start: 2021-11-28 | End: 2022-03-07

## 2021-11-28 RX ADMIN — OXYCODONE HYDROCHLORIDE 10 MG: 5 TABLET ORAL at 00:04

## 2021-11-28 RX ADMIN — OXYCODONE HYDROCHLORIDE 10 MG: 5 TABLET ORAL at 07:57

## 2021-11-28 RX ADMIN — VALSARTAN 160 MG: 80 TABLET, FILM COATED ORAL at 07:56

## 2021-11-28 RX ADMIN — SODIUM CHLORIDE, PRESERVATIVE FREE 10 ML: 5 INJECTION INTRAVENOUS at 08:02

## 2021-11-29 ENCOUNTER — READMISSION MANAGEMENT (OUTPATIENT)
Dept: CALL CENTER | Facility: HOSPITAL | Age: 41
End: 2021-11-29

## 2021-11-29 NOTE — OUTREACH NOTE
Prep Survey      Responses   [facility]? [facility]   Is LACE score < 7 ? No   Emergency Room discharge w/ pulse ox? No   Eligibility Readm Mgmt   Discharge diagnosis S/P exploratory laparotomy and lysis of adhesions    Does the patient have one of the following disease processes/diagnoses(primary or secondary)? General Surgery   Does the patient have Home health ordered? No   Is there a DME ordered? No   Prep survey completed? Yes          Vivien Pinzon RN

## 2021-12-02 ENCOUNTER — READMISSION MANAGEMENT (OUTPATIENT)
Dept: CALL CENTER | Facility: HOSPITAL | Age: 41
End: 2021-12-02

## 2021-12-02 NOTE — OUTREACH NOTE
General Surgery Week 1 Survey      Responses   [facility] patient discharged from? [facility]   Does the patient have one of the following disease processes/diagnoses(primary or secondary)? General Surgery   Week 1 attempt successful? Yes   Call start time 1154   Call end time 1155   Discharge diagnosis S/P exploratory laparotomy and lysis of adhesions    Meds reviewed with patient/caregiver? Yes   Is the patient having any side effects they believe may be caused by any medication additions or changes? No   Does the patient have all medications related to this admission filled (includes all antibiotics, pain medications, etc.) Yes   Is the patient taking all medications as directed (includes completed medication regime)? Yes   Does the patient have a follow up appointment scheduled with their surgeon? Yes   Has the patient kept scheduled appointments due by today? Yes   Comments Has a followup with surgeon on Monday 12/6/2021   Has home health visited the patient within 72 hours of discharge? N/A   Psychosocial issues? No   Did the patient receive a copy of their discharge instructions? Yes   Nursing interventions Reviewed instructions with patient   What is the patient's perception of their health status since discharge? Improving   Nursing interventions Nurse provided patient education   Is the patient /caregiver able to teach back basic post-op care? Drive as instructed by MD in discharge instructions,  Take showers only when approved by MD-sponge bathe until then,  No tub bath, swimming, or hot tub until instructed by MD,  Keep incision areas clean,dry and protected,  Lifting as instructed by MD in discharge instructions   Is the patient/caregiver able to teach back signs and symptoms of incisional infection? Increased redness, swelling or pain at the incisonal site,  Incisional warmth,  Pus or odor from incision,  Fever,  Increased drainage or bleeding   Is the patient/caregiver able to teach back steps to  recovery at home? Set small, achievable goals for return to baseline health,  Rest and rebuild strength, gradually increase activity,  Make a list of questions for surgeon's appointment   If the patient is a current smoker, are they able to teach back resources for cessation? Not a smoker   Is the patient/caregiver able to teach back the hierarchy of who to call/visit for symptoms/problems? PCP, Specialist, Home health nurse, Urgent Care, ED, 911 Yes   Week 1 call completed? Yes          Kev Ladd RN

## 2021-12-06 ENCOUNTER — OFFICE VISIT (OUTPATIENT)
Dept: SURGERY | Facility: CLINIC | Age: 41
End: 2021-12-06

## 2021-12-06 VITALS — BODY MASS INDEX: 22.4 KG/M2 | RESPIRATION RATE: 16 BRPM | HEIGHT: 71 IN | WEIGHT: 160 LBS

## 2021-12-06 DIAGNOSIS — Z09 FOLLOW UP: Primary | ICD-10-CM

## 2021-12-06 PROCEDURE — 99024 POSTOP FOLLOW-UP VISIT: CPT | Performed by: SURGERY

## 2021-12-06 NOTE — PROGRESS NOTES
Patient is here for follow-up after laparotomy for small bowel obstruction.  Small bowel resection was not necessary.  Patient is doing well.  He is eating food normally.  No complaints.  Incision has healed well.  I remove the staples today.  My assessment is the patient is recovering satisfactorily.  No new issues to address.  Patient was instructed not to lift more than 20 pounds for a full 6 weeks from the time of his surgery.  Patient will see me PRN.

## 2021-12-09 ENCOUNTER — READMISSION MANAGEMENT (OUTPATIENT)
Dept: CALL CENTER | Facility: HOSPITAL | Age: 41
End: 2021-12-09

## 2021-12-09 NOTE — OUTREACH NOTE
"General Surgery Week 2 Survey      Responses   [facility] patient discharged from? [facility]   Does the patient have one of the following disease processes/diagnoses(primary or secondary)? General Surgery   Week 2 attempt successful? Yes   Call start time 1547   Call end time 1550   Discharge diagnosis S/P exploratory laparotomy and lysis of adhesions    Person spoke with today (if not patient) and relationship Lisbeth-wife    Meds reviewed with patient/caregiver? Yes   Is the patient taking all medications as directed (includes completed medication regime)? Yes   Has the patient kept scheduled appointments due by today? Yes   What is the patient's perception of their health status since discharge? Improving   Is the patient /caregiver able to teach back basic post-op care? Drive as instructed by MD in discharge instructions   Is the patient/caregiver able to teach back steps to recovery at home? Rest and rebuild strength, gradually increase activity,  Eat a well-balance diet   Week 2 call completed? Yes   Revoked No further contact(revokes)-requires comment   Is the patient interested in additional calls from an ambulatory ?  NOTE:  applies to high risk patients requiring additional follow-up. No   Graduated/Revoked comments Wife reports, \"I think he's alright\" when asked about a call next week          [place] Rodriges RN  "

## 2022-03-07 ENCOUNTER — HOSPITAL ENCOUNTER (OUTPATIENT)
Facility: HOSPITAL | Age: 42
Setting detail: OBSERVATION
Discharge: HOME | End: 2022-03-09
Attending: EMERGENCY MEDICINE | Admitting: INTERNAL MEDICINE

## 2022-03-07 ENCOUNTER — APPOINTMENT (OUTPATIENT)
Dept: CT IMAGING | Facility: HOSPITAL | Age: 42
End: 2022-03-07

## 2022-03-07 DIAGNOSIS — K65.1 PERITONEAL ABSCESS: Primary | ICD-10-CM

## 2022-03-07 PROBLEM — I10 ESSENTIAL HYPERTENSION: Status: ACTIVE | Noted: 2022-03-07

## 2022-03-07 PROBLEM — I73.9 PERIPHERAL VASCULAR DISEASE: Status: ACTIVE | Noted: 2022-03-07

## 2022-03-07 PROBLEM — Z72.0 TOBACCO ABUSE: Status: ACTIVE | Noted: 2022-03-07

## 2022-03-07 PROBLEM — E78.5 HYPERLIPIDEMIA: Status: ACTIVE | Noted: 2022-03-07

## 2022-03-07 LAB
ALBUMIN SERPL-MCNC: 4.6 G/DL (ref 3.5–5.2)
ALBUMIN/GLOB SERPL: 1.4 G/DL
ALP SERPL-CCNC: 95 U/L (ref 39–117)
ALT SERPL W P-5'-P-CCNC: 33 U/L (ref 1–41)
ANION GAP SERPL CALCULATED.3IONS-SCNC: 12.7 MMOL/L (ref 5–15)
APTT PPP: 25.4 SECONDS (ref 22.2–34.2)
AST SERPL-CCNC: 30 U/L (ref 1–40)
BASOPHILS # BLD AUTO: 0.08 10*3/MM3 (ref 0–0.2)
BASOPHILS NFR BLD AUTO: 0.6 % (ref 0–1.5)
BILIRUB SERPL-MCNC: 0.2 MG/DL (ref 0–1.2)
BUN SERPL-MCNC: 8 MG/DL (ref 6–20)
BUN/CREAT SERPL: 9.2 (ref 7–25)
CALCIUM SPEC-SCNC: 10 MG/DL (ref 8.6–10.5)
CHLORIDE SERPL-SCNC: 105 MMOL/L (ref 98–107)
CO2 SERPL-SCNC: 26.3 MMOL/L (ref 22–29)
CREAT SERPL-MCNC: 0.87 MG/DL (ref 0.76–1.27)
D-LACTATE SERPL-SCNC: 1.2 MMOL/L (ref 0.5–2)
DEPRECATED RDW RBC AUTO: 50.6 FL (ref 37–54)
EGFRCR SERPLBLD CKD-EPI 2021: 111.2 ML/MIN/1.73
EOSINOPHIL # BLD AUTO: 0.38 10*3/MM3 (ref 0–0.4)
EOSINOPHIL NFR BLD AUTO: 2.8 % (ref 0.3–6.2)
ERYTHROCYTE [DISTWIDTH] IN BLOOD BY AUTOMATED COUNT: 14.8 % (ref 12.3–15.4)
GLOBULIN UR ELPH-MCNC: 3.4 GM/DL
GLUCOSE SERPL-MCNC: 99 MG/DL (ref 65–99)
HCT VFR BLD AUTO: 45.3 % (ref 37.5–51)
HGB BLD-MCNC: 14.6 G/DL (ref 13–17.7)
HOLD SPECIMEN: NORMAL
HOLD SPECIMEN: NORMAL
IMM GRANULOCYTES # BLD AUTO: 0.04 10*3/MM3 (ref 0–0.05)
IMM GRANULOCYTES NFR BLD AUTO: 0.3 % (ref 0–0.5)
INR PPP: 0.9 (ref 2–3)
LYMPHOCYTES # BLD AUTO: 3.34 10*3/MM3 (ref 0.7–3.1)
LYMPHOCYTES NFR BLD AUTO: 24.6 % (ref 19.6–45.3)
MCH RBC QN AUTO: 30 PG (ref 26.6–33)
MCHC RBC AUTO-ENTMCNC: 32.2 G/DL (ref 31.5–35.7)
MCV RBC AUTO: 93.2 FL (ref 79–97)
MONOCYTES # BLD AUTO: 0.77 10*3/MM3 (ref 0.1–0.9)
MONOCYTES NFR BLD AUTO: 5.7 % (ref 5–12)
NEUTROPHILS NFR BLD AUTO: 66 % (ref 42.7–76)
NEUTROPHILS NFR BLD AUTO: 8.95 10*3/MM3 (ref 1.7–7)
NRBC BLD AUTO-RTO: 0 /100 WBC (ref 0–0.2)
PLATELET # BLD AUTO: 290 10*3/MM3 (ref 140–450)
PMV BLD AUTO: 10.8 FL (ref 6–12)
POTASSIUM SERPL-SCNC: 4 MMOL/L (ref 3.5–5.2)
PROT SERPL-MCNC: 8 G/DL (ref 6–8.5)
PROTHROMBIN TIME: 9.6 SECONDS (ref 9.4–12)
RBC # BLD AUTO: 4.86 10*6/MM3 (ref 4.14–5.8)
SODIUM SERPL-SCNC: 144 MMOL/L (ref 136–145)
WBC NRBC COR # BLD: 13.56 10*3/MM3 (ref 3.4–10.8)
WHOLE BLOOD HOLD SPECIMEN: NORMAL
WHOLE BLOOD HOLD SPECIMEN: NORMAL

## 2022-03-07 PROCEDURE — 25010000002 PIPERACILLIN SOD-TAZOBACTAM PER 1 G: Performed by: EMERGENCY MEDICINE

## 2022-03-07 PROCEDURE — 85025 COMPLETE CBC W/AUTO DIFF WBC: CPT | Performed by: EMERGENCY MEDICINE

## 2022-03-07 PROCEDURE — 0 IOPAMIDOL PER 1 ML: Performed by: EMERGENCY MEDICINE

## 2022-03-07 PROCEDURE — 99284 EMERGENCY DEPT VISIT MOD MDM: CPT

## 2022-03-07 PROCEDURE — 83605 ASSAY OF LACTIC ACID: CPT | Performed by: EMERGENCY MEDICINE

## 2022-03-07 PROCEDURE — 36415 COLL VENOUS BLD VENIPUNCTURE: CPT | Performed by: EMERGENCY MEDICINE

## 2022-03-07 PROCEDURE — 99220 PR INITIAL OBSERVATION CARE/DAY 70 MINUTES: CPT | Performed by: FAMILY MEDICINE

## 2022-03-07 PROCEDURE — 25010000002 ONDANSETRON PER 1 MG: Performed by: EMERGENCY MEDICINE

## 2022-03-07 PROCEDURE — 80053 COMPREHEN METABOLIC PANEL: CPT | Performed by: EMERGENCY MEDICINE

## 2022-03-07 PROCEDURE — 87040 BLOOD CULTURE FOR BACTERIA: CPT | Performed by: EMERGENCY MEDICINE

## 2022-03-07 PROCEDURE — 94799 UNLISTED PULMONARY SVC/PX: CPT

## 2022-03-07 PROCEDURE — 96365 THER/PROPH/DIAG IV INF INIT: CPT

## 2022-03-07 PROCEDURE — 96375 TX/PRO/DX INJ NEW DRUG ADDON: CPT

## 2022-03-07 PROCEDURE — G0378 HOSPITAL OBSERVATION PER HR: HCPCS

## 2022-03-07 PROCEDURE — 74177 CT ABD & PELVIS W/CONTRAST: CPT

## 2022-03-07 PROCEDURE — 25010000002 VANCOMYCIN 5 G RECONSTITUTED SOLUTION: Performed by: EMERGENCY MEDICINE

## 2022-03-07 PROCEDURE — 25010000002 MORPHINE PER 10 MG: Performed by: EMERGENCY MEDICINE

## 2022-03-07 PROCEDURE — 85730 THROMBOPLASTIN TIME PARTIAL: CPT | Performed by: EMERGENCY MEDICINE

## 2022-03-07 PROCEDURE — 85610 PROTHROMBIN TIME: CPT | Performed by: EMERGENCY MEDICINE

## 2022-03-07 RX ORDER — DIPHENHYDRAMINE HYDROCHLORIDE 50 MG/ML
25 INJECTION INTRAMUSCULAR; INTRAVENOUS ONCE
Status: COMPLETED | OUTPATIENT
Start: 2022-03-08 | End: 2022-03-07

## 2022-03-07 RX ORDER — DIPHENHYDRAMINE HYDROCHLORIDE 50 MG/ML
INJECTION INTRAMUSCULAR; INTRAVENOUS
Status: DISPENSED
Start: 2022-03-07 | End: 2022-03-08

## 2022-03-07 RX ORDER — ONDANSETRON 2 MG/ML
4 INJECTION INTRAMUSCULAR; INTRAVENOUS ONCE
Status: COMPLETED | OUTPATIENT
Start: 2022-03-07 | End: 2022-03-07

## 2022-03-07 RX ORDER — SODIUM CHLORIDE 0.9 % (FLUSH) 0.9 %
10 SYRINGE (ML) INJECTION AS NEEDED
Status: DISCONTINUED | OUTPATIENT
Start: 2022-03-07 | End: 2022-03-08

## 2022-03-07 RX ADMIN — ONDANSETRON 4 MG: 2 INJECTION INTRAMUSCULAR; INTRAVENOUS at 21:44

## 2022-03-07 RX ADMIN — DIPHENHYDRAMINE HYDROCHLORIDE 25 MG: 50 INJECTION, SOLUTION INTRAMUSCULAR; INTRAVENOUS at 23:45

## 2022-03-07 RX ADMIN — VANCOMYCIN HYDROCHLORIDE 1500 MG: 5 INJECTION, POWDER, LYOPHILIZED, FOR SOLUTION INTRAVENOUS at 23:20

## 2022-03-07 RX ADMIN — TAZOBACTAM SODIUM AND PIPERACILLIN SODIUM 3.38 G: 375; 3 INJECTION, SOLUTION INTRAVENOUS at 21:32

## 2022-03-07 RX ADMIN — IOPAMIDOL 100 ML: 755 INJECTION, SOLUTION INTRAVENOUS at 21:29

## 2022-03-07 RX ADMIN — SODIUM CHLORIDE 1000 ML: 9 INJECTION, SOLUTION INTRAVENOUS at 20:40

## 2022-03-07 RX ADMIN — MORPHINE SULFATE 4 MG: 4 INJECTION, SOLUTION INTRAMUSCULAR; INTRAVENOUS at 21:45

## 2022-03-08 ENCOUNTER — APPOINTMENT (OUTPATIENT)
Dept: CT IMAGING | Facility: HOSPITAL | Age: 42
End: 2022-03-08

## 2022-03-08 PROBLEM — L03.90 CELLULITIS: Status: ACTIVE | Noted: 2022-03-08

## 2022-03-08 LAB
BASOPHILS # BLD AUTO: 0.06 10*3/MM3 (ref 0–0.2)
BASOPHILS NFR BLD AUTO: 0.5 % (ref 0–1.5)
DEPRECATED RDW RBC AUTO: 50.9 FL (ref 37–54)
EOSINOPHIL # BLD AUTO: 0.27 10*3/MM3 (ref 0–0.4)
EOSINOPHIL NFR BLD AUTO: 2.3 % (ref 0.3–6.2)
ERYTHROCYTE [DISTWIDTH] IN BLOOD BY AUTOMATED COUNT: 15 % (ref 12.3–15.4)
HCT VFR BLD AUTO: 39.9 % (ref 37.5–51)
HGB BLD-MCNC: 12.8 G/DL (ref 13–17.7)
IMM GRANULOCYTES # BLD AUTO: 0.1 10*3/MM3 (ref 0–0.05)
IMM GRANULOCYTES NFR BLD AUTO: 0.8 % (ref 0–0.5)
INR PPP: 0.95 (ref 2–3)
LYMPHOCYTES # BLD AUTO: 3.25 10*3/MM3 (ref 0.7–3.1)
LYMPHOCYTES NFR BLD AUTO: 27.4 % (ref 19.6–45.3)
MCH RBC QN AUTO: 29.8 PG (ref 26.6–33)
MCHC RBC AUTO-ENTMCNC: 32.1 G/DL (ref 31.5–35.7)
MCV RBC AUTO: 93 FL (ref 79–97)
MONOCYTES # BLD AUTO: 0.81 10*3/MM3 (ref 0.1–0.9)
MONOCYTES NFR BLD AUTO: 6.8 % (ref 5–12)
NEUTROPHILS NFR BLD AUTO: 62.2 % (ref 42.7–76)
NEUTROPHILS NFR BLD AUTO: 7.39 10*3/MM3 (ref 1.7–7)
NRBC BLD AUTO-RTO: 0 /100 WBC (ref 0–0.2)
PLATELET # BLD AUTO: 236 10*3/MM3 (ref 140–450)
PMV BLD AUTO: 10.8 FL (ref 6–12)
PROTHROMBIN TIME: 10.1 SECONDS (ref 9.4–12)
RBC # BLD AUTO: 4.29 10*6/MM3 (ref 4.14–5.8)
WBC NRBC COR # BLD: 11.88 10*3/MM3 (ref 3.4–10.8)

## 2022-03-08 PROCEDURE — G0378 HOSPITAL OBSERVATION PER HR: HCPCS

## 2022-03-08 PROCEDURE — 99214 OFFICE O/P EST MOD 30 MIN: CPT | Performed by: NURSE PRACTITIONER

## 2022-03-08 PROCEDURE — 25010000002 DIPHENHYDRAMINE PER 50 MG: Performed by: EMERGENCY MEDICINE

## 2022-03-08 PROCEDURE — 87205 SMEAR GRAM STAIN: CPT | Performed by: INTERNAL MEDICINE

## 2022-03-08 PROCEDURE — 87070 CULTURE OTHR SPECIMN AEROBIC: CPT | Performed by: INTERNAL MEDICINE

## 2022-03-08 PROCEDURE — 85025 COMPLETE CBC W/AUTO DIFF WBC: CPT | Performed by: FAMILY MEDICINE

## 2022-03-08 PROCEDURE — 94799 UNLISTED PULMONARY SVC/PX: CPT

## 2022-03-08 PROCEDURE — 25010000002 FENTANYL CITRATE (PF) 50 MCG/ML SOLUTION: Performed by: RADIOLOGY

## 2022-03-08 PROCEDURE — 25010000002 PIPERACILLIN SOD-TAZOBACTAM PER 1 G: Performed by: FAMILY MEDICINE

## 2022-03-08 PROCEDURE — 99225 PR SBSQ OBSERVATION CARE/DAY 25 MINUTES: CPT | Performed by: INTERNAL MEDICINE

## 2022-03-08 PROCEDURE — C1729 CATH, DRAINAGE: HCPCS | Performed by: NURSE PRACTITIONER

## 2022-03-08 PROCEDURE — 87186 SC STD MICRODIL/AGAR DIL: CPT | Performed by: INTERNAL MEDICINE

## 2022-03-08 PROCEDURE — 85610 PROTHROMBIN TIME: CPT | Performed by: NURSE PRACTITIONER

## 2022-03-08 PROCEDURE — 25010000002 DIPHENHYDRAMINE PER 50 MG: Performed by: FAMILY MEDICINE

## 2022-03-08 PROCEDURE — 25010000002 VANCOMYCIN 5 G RECONSTITUTED SOLUTION: Performed by: FAMILY MEDICINE

## 2022-03-08 PROCEDURE — 25010000002 MIDAZOLAM PER 1 MG: Performed by: RADIOLOGY

## 2022-03-08 PROCEDURE — 87077 CULTURE AEROBIC IDENTIFY: CPT | Performed by: INTERNAL MEDICINE

## 2022-03-08 PROCEDURE — 49406 IMAGE CATH FLUID PERI/RETRO: CPT

## 2022-03-08 PROCEDURE — 96376 TX/PRO/DX INJ SAME DRUG ADON: CPT

## 2022-03-08 PROCEDURE — C2617 STENT, NON-COR, TEM W/O DEL: HCPCS | Performed by: NURSE PRACTITIONER

## 2022-03-08 PROCEDURE — 75989 ABSCESS DRAINAGE UNDER X-RAY: CPT

## 2022-03-08 RX ORDER — DIPHENHYDRAMINE HYDROCHLORIDE 50 MG/ML
50 INJECTION INTRAMUSCULAR; INTRAVENOUS EVERY 12 HOURS PRN
Status: DISCONTINUED | OUTPATIENT
Start: 2022-03-08 | End: 2022-03-09 | Stop reason: HOSPADM

## 2022-03-08 RX ORDER — ACETAMINOPHEN 160 MG/5ML
650 SOLUTION ORAL EVERY 4 HOURS PRN
Status: DISCONTINUED | OUTPATIENT
Start: 2022-03-08 | End: 2022-03-09 | Stop reason: HOSPADM

## 2022-03-08 RX ORDER — POLYETHYLENE GLYCOL 3350 17 G/17G
17 POWDER, FOR SOLUTION ORAL DAILY PRN
Status: DISCONTINUED | OUTPATIENT
Start: 2022-03-08 | End: 2022-03-09 | Stop reason: HOSPADM

## 2022-03-08 RX ORDER — MIDAZOLAM HYDROCHLORIDE 1 MG/ML
INJECTION INTRAMUSCULAR; INTRAVENOUS
Status: DISPENSED
Start: 2022-03-08 | End: 2022-03-09

## 2022-03-08 RX ORDER — CHOLECALCIFEROL (VITAMIN D3) 125 MCG
5 CAPSULE ORAL NIGHTLY PRN
Status: DISCONTINUED | OUTPATIENT
Start: 2022-03-08 | End: 2022-03-09 | Stop reason: HOSPADM

## 2022-03-08 RX ORDER — ACETAMINOPHEN 650 MG/1
650 SUPPOSITORY RECTAL EVERY 4 HOURS PRN
Status: DISCONTINUED | OUTPATIENT
Start: 2022-03-08 | End: 2022-03-09 | Stop reason: HOSPADM

## 2022-03-08 RX ORDER — AMOXICILLIN 250 MG
2 CAPSULE ORAL 2 TIMES DAILY
Status: DISCONTINUED | OUTPATIENT
Start: 2022-03-08 | End: 2022-03-09 | Stop reason: HOSPADM

## 2022-03-08 RX ORDER — NICOTINE 21 MG/24HR
1 PATCH, TRANSDERMAL 24 HOURS TRANSDERMAL
Status: DISCONTINUED | OUTPATIENT
Start: 2022-03-08 | End: 2022-03-09 | Stop reason: HOSPADM

## 2022-03-08 RX ORDER — CALCIUM CARBONATE 200(500)MG
2 TABLET,CHEWABLE ORAL 2 TIMES DAILY PRN
Status: DISCONTINUED | OUTPATIENT
Start: 2022-03-08 | End: 2022-03-09 | Stop reason: HOSPADM

## 2022-03-08 RX ORDER — DIPHENHYDRAMINE HYDROCHLORIDE 50 MG/ML
50 INJECTION INTRAMUSCULAR; INTRAVENOUS EVERY 6 HOURS PRN
Status: DISCONTINUED | OUTPATIENT
Start: 2022-03-08 | End: 2022-03-08

## 2022-03-08 RX ORDER — LIDOCAINE HYDROCHLORIDE 20 MG/ML
INJECTION, SOLUTION INFILTRATION; PERINEURAL
Status: DISPENSED
Start: 2022-03-08 | End: 2022-03-09

## 2022-03-08 RX ORDER — MIDAZOLAM HYDROCHLORIDE 1 MG/ML
INJECTION INTRAMUSCULAR; INTRAVENOUS
Status: COMPLETED | OUTPATIENT
Start: 2022-03-08 | End: 2022-03-08

## 2022-03-08 RX ORDER — BISACODYL 5 MG/1
5 TABLET, DELAYED RELEASE ORAL DAILY PRN
Status: DISCONTINUED | OUTPATIENT
Start: 2022-03-08 | End: 2022-03-09 | Stop reason: HOSPADM

## 2022-03-08 RX ORDER — ACETAMINOPHEN 325 MG/1
650 TABLET ORAL EVERY 4 HOURS PRN
Status: DISCONTINUED | OUTPATIENT
Start: 2022-03-08 | End: 2022-03-09 | Stop reason: HOSPADM

## 2022-03-08 RX ORDER — ONDANSETRON 4 MG/1
4 TABLET, FILM COATED ORAL EVERY 6 HOURS PRN
Status: DISCONTINUED | OUTPATIENT
Start: 2022-03-08 | End: 2022-03-09 | Stop reason: HOSPADM

## 2022-03-08 RX ORDER — BISACODYL 10 MG
10 SUPPOSITORY, RECTAL RECTAL DAILY PRN
Status: DISCONTINUED | OUTPATIENT
Start: 2022-03-08 | End: 2022-03-09 | Stop reason: HOSPADM

## 2022-03-08 RX ORDER — ROSUVASTATIN CALCIUM 5 MG/1
10 TABLET, COATED ORAL NIGHTLY
Status: DISCONTINUED | OUTPATIENT
Start: 2022-03-08 | End: 2022-03-09 | Stop reason: HOSPADM

## 2022-03-08 RX ORDER — FENTANYL CITRATE 50 UG/ML
INJECTION, SOLUTION INTRAMUSCULAR; INTRAVENOUS
Status: COMPLETED | OUTPATIENT
Start: 2022-03-08 | End: 2022-03-08

## 2022-03-08 RX ORDER — FENTANYL CITRATE 50 UG/ML
INJECTION, SOLUTION INTRAMUSCULAR; INTRAVENOUS
Status: DISPENSED
Start: 2022-03-08 | End: 2022-03-09

## 2022-03-08 RX ORDER — ONDANSETRON 2 MG/ML
4 INJECTION INTRAMUSCULAR; INTRAVENOUS EVERY 6 HOURS PRN
Status: DISCONTINUED | OUTPATIENT
Start: 2022-03-08 | End: 2022-03-09 | Stop reason: HOSPADM

## 2022-03-08 RX ORDER — OXYCODONE HYDROCHLORIDE AND ACETAMINOPHEN 5; 325 MG/1; MG/1
1 TABLET ORAL EVERY 4 HOURS PRN
Status: DISCONTINUED | OUTPATIENT
Start: 2022-03-08 | End: 2022-03-09 | Stop reason: HOSPADM

## 2022-03-08 RX ORDER — SODIUM CHLORIDE, SODIUM LACTATE, POTASSIUM CHLORIDE, CALCIUM CHLORIDE 600; 310; 30; 20 MG/100ML; MG/100ML; MG/100ML; MG/100ML
100 INJECTION, SOLUTION INTRAVENOUS CONTINUOUS
Status: ACTIVE | OUTPATIENT
Start: 2022-03-08 | End: 2022-03-08

## 2022-03-08 RX ORDER — VALSARTAN 80 MG/1
160 TABLET ORAL DAILY
Status: DISCONTINUED | OUTPATIENT
Start: 2022-03-08 | End: 2022-03-09 | Stop reason: HOSPADM

## 2022-03-08 RX ADMIN — VANCOMYCIN HYDROCHLORIDE 1250 MG: 5 INJECTION, POWDER, LYOPHILIZED, FOR SOLUTION INTRAVENOUS at 11:18

## 2022-03-08 RX ADMIN — SODIUM CHLORIDE, POTASSIUM CHLORIDE, SODIUM LACTATE AND CALCIUM CHLORIDE 100 ML/HR: 600; 310; 30; 20 INJECTION, SOLUTION INTRAVENOUS at 01:10

## 2022-03-08 RX ADMIN — ROSUVASTATIN CALCIUM 10 MG: 5 TABLET, FILM COATED ORAL at 21:03

## 2022-03-08 RX ADMIN — FENTANYL CITRATE 50 MCG: 50 INJECTION, SOLUTION INTRAMUSCULAR; INTRAVENOUS at 14:21

## 2022-03-08 RX ADMIN — OXYCODONE HYDROCHLORIDE AND ACETAMINOPHEN 1 TABLET: 5; 325 TABLET ORAL at 06:40

## 2022-03-08 RX ADMIN — FENTANYL CITRATE 50 MCG: 50 INJECTION, SOLUTION INTRAMUSCULAR; INTRAVENOUS at 14:32

## 2022-03-08 RX ADMIN — TAZOBACTAM SODIUM AND PIPERACILLIN SODIUM 3.38 G: 375; 3 INJECTION, SOLUTION INTRAVENOUS at 15:37

## 2022-03-08 RX ADMIN — VANCOMYCIN HYDROCHLORIDE 1250 MG: 5 INJECTION, POWDER, LYOPHILIZED, FOR SOLUTION INTRAVENOUS at 23:26

## 2022-03-08 RX ADMIN — OXYCODONE HYDROCHLORIDE AND ACETAMINOPHEN 1 TABLET: 5; 325 TABLET ORAL at 18:03

## 2022-03-08 RX ADMIN — DIPHENHYDRAMINE HYDROCHLORIDE 50 MG: 50 INJECTION, SOLUTION INTRAMUSCULAR; INTRAVENOUS at 10:45

## 2022-03-08 RX ADMIN — SENNOSIDES AND DOCUSATE SODIUM 2 TABLET: 50; 8.6 TABLET ORAL at 21:03

## 2022-03-08 RX ADMIN — TAZOBACTAM SODIUM AND PIPERACILLIN SODIUM 3.38 G: 375; 3 INJECTION, SOLUTION INTRAVENOUS at 05:45

## 2022-03-08 RX ADMIN — TAZOBACTAM SODIUM AND PIPERACILLIN SODIUM 3.38 G: 375; 3 INJECTION, SOLUTION INTRAVENOUS at 21:03

## 2022-03-08 RX ADMIN — SODIUM CHLORIDE, POTASSIUM CHLORIDE, SODIUM LACTATE AND CALCIUM CHLORIDE 1000 ML: 600; 310; 30; 20 INJECTION, SOLUTION INTRAVENOUS at 01:10

## 2022-03-08 RX ADMIN — OXYCODONE HYDROCHLORIDE AND ACETAMINOPHEN 1 TABLET: 5; 325 TABLET ORAL at 23:34

## 2022-03-08 RX ADMIN — DIPHENHYDRAMINE HYDROCHLORIDE 50 MG: 50 INJECTION, SOLUTION INTRAMUSCULAR; INTRAVENOUS at 23:31

## 2022-03-08 RX ADMIN — MIDAZOLAM HYDROCHLORIDE 2 MG: 1 INJECTION, SOLUTION INTRAMUSCULAR; INTRAVENOUS at 14:22

## 2022-03-08 RX ADMIN — OXYCODONE HYDROCHLORIDE AND ACETAMINOPHEN 1 TABLET: 5; 325 TABLET ORAL at 01:17

## 2022-03-08 RX ADMIN — OXYCODONE HYDROCHLORIDE AND ACETAMINOPHEN 1 TABLET: 5; 325 TABLET ORAL at 11:23

## 2022-03-08 NOTE — H&P
History and Physical   Luis Angel Jade , :  1980, MRN:  [id number]    Chief complaint: Abdominal pain    Assessment/Plan       Peritoneal abscess (HCC)    Essential hypertension    Tobacco abuse    Hyperlipidemia    Peripheral vascular disease (HCC)    Cellulitis      • Peritoneal abscess with cellulitis: The patient has 4 days of midline abdominal pain, redness, swelling, worsening today.  No fevers or chills.  The patient reports pain with movement.  CT scan shows peritoneal abscess and subcutaneous cellulitis.  Patient started on vancomycin, Zosyn.  Will continue.  Of note, the patient did have a reaction to vancomycin.  We will continue.  General surgery consult, anticipate possible IR drainage.  N.p.o.    • Hyperlipidemia: Continue patient on home rosuvastatin.    • Hypertension: Blood pressure well controlled at 128/82.  Resuming home valsartan.    • Tobacco abuse: Patient smokes a pack of cigarettes per day.  Counseled on cessation.  He is precontemplative.    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  He takes aspirin at home.  Will hold for now pending procedures.    • Clinical course will dictate further medical management.    DVT Prophylaxis: SCDs, hold aspirin and anticoagulation pending procedure  Code Status: Full    Reviewed patients labs and imaging, and discussed with patient and nurse at bedside.    Patient risk level:  Patient comorbidities and risk factors make patient a poor candidate for outpatient management due to concerns of deterioration.    Total time spent on admission: 70 minutes    History of Present illness     Luis Angel Jade is a 41 y.o. old male patient who presents with abdominal pain, swelling.  Patient has a history of hypertension, hyperlipidemia, peripheral vascular disease, tobacco abuse.    Patient presents to the emergency department complaining of 4 days of midline abdominal pain with redness,  swelling.  The patient has a history of laparotomy in the past with aortofemoral bypass, addition to appendectomy and small bowel obstruction with lysis of adhesions.  Patient reports he noted swelling and redness around his umbilicus with tenderness.  No known fevers or chills.  Reports he had increasing redness and induration, and for this reason he presented to the emergency department for evaluation.    ED course: On arrival to the ED the patient was noted to have normal vital signs.  He was found to have an elevated white blood cell count at 13,500.  The remainder of his blood work was normal.  CT of the abdomen and pelvis showed a subcutaneous fluid collection in addition to a peritoneal fluid collection.  General surgery was consulted, requested hospitalist admission.  Patient was started on vancomycin and Zosyn.    Old records were reviewed which revealed last admission to the hospital in November 2021 with small bowel obstruction and lysis of adhesions.    Review of Systems     General:  Denies fevers, chills, weight loss/gain or night sweats.  HEENT: Denies dysphagia, hearing changes, rhinorrhea, visual changes or nasal congestion.  Respiratory: Denies cough, dyspnea, sputum changes, wheezing or hemoptysis.  Denies pleuritic chest pain.  Cardiovascular: Denies chest pain, palpitations, dyspnea on exertion or orthopnea. Denies chest pressure. Denies lower extremity edema.  Gastrointestinal: Reports abdominal pain.  Denies nausea, vomiting or diarrhea.  Denies bright red blood per rectum, melena or cramping.  Genitourinary: Denies dysuria, frequency or hesitancy. Denies incontinence, urgency or hematuria.  Musculoskeletal: Denies joint effusions, joint tenderness, joint stiffness or myalgias.  Endocrine: Denies heat or cold intolerance.  Reports fatigue.   Hematologic: Denies bleeding, bruising or swollen glands.  Psychiatric: Denies anxiety or depression.  Neurologic: Denies confusion, headaches, numbness  or visual changes.  Skin: Denies rash, edema or open wounds.    Past Medical/Surgical/Social/Family History/Allergies     Past Medical History:   Diagnosis Date   • HLD (hyperlipidemia)    • Hypertension    • S/P appendectomy 11/22/2021   • Vascular disease        Past Surgical History:   Procedure Laterality Date   • AORTA BIFEMORAL BYPASS     • EXPLORATORY LAPAROTOMY N/A 11/21/2021    Procedure: EXPLORATORY LAPAROTOMY, LYSIS OF ADHESIONS,  APPENDECTOMY,;  Surgeon: Siddhartha Adams MD;  Location: [facility] MAIN OR;  Service: General;  Laterality: N/A;   • HERNIA REPAIR         Social History     Socioeconomic History   • Marital status:    Tobacco Use   • Smoking status: Current Every Day Smoker     Packs/day: 1.00   • Smokeless tobacco: Never Used   Substance and Sexual Activity   • Alcohol use: No   • Drug use: No       Family History   Problem Relation Age of Onset   • Heart disease Father        No Known Allergies    The above past medical history, past surgical history, social history, family history allergies and home medications were personally reviewed by me today and corrected as needed  Home Medications   (Not in a hospital admission)    Physical Exam   Vital signs:  Temperature Blood Pressure Heart Rate Resp Rate SpO2   Temp: 98.4 °F (36.9 °C) BP: 145/86 Heart Rate: 78 Resp: 18 SpO2: 98 %     HEENT: Head is atraumatic, extraocular movements are intact. Oropharynx is normal.  Neck: Supple, no cervical lymphadenopathy.  Cardiovascular: Regular rate and rhythm. No murmurs, gallops or rubs. No peripheral edema. No JVD.  Lungs: Clear to auscultation bilaterally.  No diminished breath sounds, rales, crackles or wheezes.  Abdomen: Normal bowel sounds in all 4 quadrants. No rebound, guarding or tenderness.  Periumbilical redness, swelling, induration.  Chest: Normal inspection. Equal rise and fall. No retractions noted.  Constitutional: Well-nourished, well-developed, in no apparent distress.  Lymphatic: No  cervical lymphadenopathy.  Extremities: 5/5 upper and lower extremity strength. Normal range of motion.  No clubbing, cyanosis or edema.  Neurological: Alert and oriented x3. No numbness or tingling.  Psychological: Normal mood and affect. Well kempt. Normal eye contact.  Skin: No rash, cellulitis, swelling or bruising.    Labs     Results from last 7 days   Lab Units 03/07/22  1840   WBC 10*3/mm3 13.56*   HEMOGLOBIN g/dL 14.6   HEMATOCRIT % 45.3   PLATELETS 10*3/mm3 290     Results from last 7 days   Lab Units 03/07/22  1840   SODIUM mmol/L 144   POTASSIUM mmol/L 4.0   CHLORIDE mmol/L 105   CO2 mmol/L 26.3   BUN mg/dL 8   CREATININE mg/dL 0.87   CALCIUM mg/dL 10.0     Results from last 7 days   Lab Units 03/07/22  1840   ALT (SGPT) U/L 33   AST (SGOT) U/L 30   ALK PHOS U/L 95   BILIRUBIN mg/dL 0.2                   Invalid input(s): CPK, MASSCKMB        I reviewed patient's labs from today and also previous labs while reviewing old records   Imaging and Other procedures   I reviewed patient's imaging and other testing from today and also in the past including but not limited to imaging, previous imaging, EKG, previous EKGs, echocardiogram, and other procedures if any and previously done by reviewing old records  Current Medications   Scheduled Meds:   Continuous Infusions:     Prior to Admission Medications     Prescriptions Last Dose Informant Patient Reported? Taking?    aspirin 81 MG EC tablet  Spouse/Significant Other Yes Yes    Take 81 mg by mouth Daily.    rosuvastatin (CRESTOR) 10 MG tablet  Spouse/Significant Other Yes Yes    Take 10 mg by mouth Every Night.    valsartan (DIOVAN) 160 MG tablet   Yes Yes    Take 160 mg by mouth Daily.        03/07/22  23:34 EST

## 2022-03-08 NOTE — PROGRESS NOTES
"Pharmacy to Dose Vancomycin Day: 1    Luis Angel Jade is a 41 y.o.male admitted with IAI. Pharmacy has been consulted to dose IV Vancomycin     Consulting Provider: Florence  Clinical Indication: IAI  Goal -600 mg/L.hr  Duration of therapy: 7 days    180.3 cm (71\")       03/08/22 0057      Weight: 76.7 kg (169 lb 1.5 oz)         Estimated Creatinine Clearance: 121.2 mL/min (by C-G formula based on SCr of 0.87 mg/dL).  Results from last 7 days  Lab  Units  03/07/22  1840  BUN  mg/dL  8  CREATININE  mg/dL  0.87      HD/PD/CRRT?: No    Lab Results   Component Value Date    WBC 13.56 (H) 03/07/2022      Temperature    03/07/22 1833 03/08/22 0055   Temp: 98.4 °F (36.9 °C) 98.1 °F (36.7 °C)        Contrast Administered: Yes    Relevant Micro:   Microbiology Results (last 10 days)       ** No results found for the last 240 hours. **             Relevant Radiology:   3/7 CT Abdomen:Deep to a laparotomy scar, there is subcutaneous fluid and deep to the fluid within the   peritoneal cavity, there is a small partially walled-off collection in the peritoneal cavity   concerning for abscess or phlegmon with adjacent enhancing granulation tissue.    2. Small amount of reactive omental/mesenteric edema and trace free fluid in the right lower   quadrant.  3. Evidence of aortobifemoral bypass with occluded native aorta.  The bypass graft appears patent    Other Antimicrobial Therapy: 3.375g IV q8h extended infusion    Assessment/Plan  Loading dose: 1500 mg IV x1 in ED @2300  Regimen: 1250 mg IV every 12 hours.  Start time: 02:39 on 03/08/2022  Exposure target: AUC24 (range)400-600 mg/L.hr   AUC24,ss: 549 mg/L.hr  PAUC*: 80 %  Ctrough,ss: 16.5 mg/L  Pconc*: 35 %  Tox.: 12 %      Note: Vanc 1250 mg IV q12h dosing. Pt had 1500 mg x1 dose in ER and had marshal symptoms. Spoke with physician who ordered benadryl and will slow the vanc infusion rate down for subsequent doses. Monitor closely. Monitor renal function and f/u on ordering " a vanc trough 30 mins prior to 4th dose on 3/9 @1100 dose.    Level due: Vanc trough 3/9 30 mins prior to @1100 dose  Labs ordered: No

## 2022-03-08 NOTE — SIGNIFICANT NOTE
03/08/22 1015   Plan   Plan Met with patient and spouse.  Patient has good family support.  Provides own IADL's.  Verified demographics, PCP, phone and pharmacy.  Plans to return home with spouse and no needs.   Patient/Family in Agreement with Plan yes

## 2022-03-08 NOTE — CONSULTS
[facility]   HISTORY AND PHYSICAL    Patient Name: Luis Angel Jade  : 1980  MRN: [id number]  Primary Care Physician:  Kvng De Luna MD  Date of admission: 3/7/2022    Subjective   Subjective     Chief Complaint: abd pain and redness    HPI:    Luis Angel Jade is a 41 y.o. male who presented to the ED at [facility] overnight with redness and abdominal pain at the lower aspect of an old midline abdominal incision.  He reports the pain started 4 days ago when driving home from [place].  The pain began suddenly and is constant.  He denies fever, chills, nausea, vomiting, diarrhea, or constipation.  He underwent an exploratory laparotomy, appendectomy, and lysis of adhesions for a bowel obstruction in 2021.  On admission his WBC count was 13.  He had a CT scan that indicates he has deep to a laparotomy scar subcutaneous fluid and deep to the fluid within the peritoneal cavity, there is a small partially walled-off collection in the peritoneal cavity concerning for abscess or phlegmon with enhancing granulation tissue.  Also, there is a small amount of reactive omental/mesenteric edema and trace free fluid in the RLQ.  He reports he feels slightly better since admission.               Personal History     Past Medical History:   Diagnosis Date   • HLD (hyperlipidemia)    • Hypertension    • S/P appendectomy 2021   • Vascular disease        Past Surgical History:   Procedure Laterality Date   • AORTA BIFEMORAL BYPASS     • EXPLORATORY LAPAROTOMY N/A 2021    Procedure: EXPLORATORY LAPAROTOMY, LYSIS OF ADHESIONS,  APPENDECTOMY,;  Surgeon: Siddhartha Adams MD;  Location: [facility] MAIN OR;  Service: General;  Laterality: N/A;   • HERNIA REPAIR         Family History: family history includes Heart disease in his father. Otherwise pertinent FHx was reviewed and not pertinent to current issue.    Social History:  reports that he has been smoking. He has been smoking about 1.00 pack per day. He has  never used smokeless tobacco. He reports that he does not drink alcohol and does not use drugs.    Home Medications:  aspirin, rosuvastatin, and valsartan      Allergies:  Allergies   Allergen Reactions   • Vancomycin Other (See Comments)     Redmans-itching with redness       Objective   Objective     Vitals:   Temp:  [98.1 °F (36.7 °C)-98.4 °F (36.9 °C)] 98.2 °F (36.8 °C)  Heart Rate:  [66-93] 66  Resp:  [16-18] 16  BP: (102-157)/(54-95) 103/54      Review of Systems  A 10 point review of systems was performed and all are negative except for what is documented in the HPI.      Physical Exam    Constitutional: Awake, alert   Eyes: PERRLA    Neck: Supple, trachea midline   Respiratory: respirations even and unlabored   Cardiovascular: RRR   Gastrointestinal: soft, nontender, nondistended,old midline laparotomy incision with erythema  and edema at lower aspect tender to palpation   Psychiatric: Appropriate affect, cooperative   Neurologic: Oriented x 3, speech clear   Skin: No rashes     Result Review    Result Review:  I have personally reviewed the results from the time of this admission to 3/8/2022 10:30 EST and agree with these findings:  []  Laboratory  []  Microbiology  []  Radiology  []  EKG/Telemetry   []  Cardiology/Vascular   []  Pathology  []  Old records  []  Other:      Assessment/Plan   Assessment / Plan   DIAGNOSIS     Abdominal abscess      Active Hospital Problems:  Active Hospital Problems    Diagnosis    • **Peritoneal abscess (HCC)    • Cellulitis    • Essential hypertension    • Tobacco abuse    • Hyperlipidemia    • Peripheral vascular disease (HCC)          Plan:   Cont IV antibiotics  IR drain placement      DVT prophylaxis:  Mechanical DVT prophylaxis orders are present.

## 2022-03-08 NOTE — PROGRESS NOTES
[facility]   Hospitalist Progress Note  Date: 3/8/2022  Patient Name: Luis Angel Jade  : 1980  MRN: [id number]  Date of admission: 3/7/2022      Subjective   Subjective     Chief Complaint: Abdominal wall cellulitis    Summary: 41 y.o. old male patient who presents with abdominal pain, swelling.  Patient has a history of hypertension, hyperlipidemia, peripheral vascular disease, tobacco abuse. Patient presents to the emergency department complaining of 4 days of midline abdominal pain with redness, swelling.  The patient has a history of laparotomy in the past with aortofemoral bypass, addition to appendectomy and small bowel obstruction with lysis of adhesions.  Patient reports he noted swelling and redness around his umbilicus with tenderness.  No known fevers or chills.  Reports he had increasing redness and induration, and for this reason he presented to the emergency department for evaluation.     ED course: On arrival to the ED the patient was noted to have normal vital signs.  He was found to have an elevated white blood cell count at 13,500.  The remainder of his blood work was normal.  CT of the abdomen and pelvis showed a subcutaneous fluid collection in addition to a peritoneal fluid collection.  General surgery was consulted, requested hospitalist admission.  Patient was started on vancomycin and Zosyn.    Interval Followup:   Patient continues to have abdominal pain as well as erythema of his abdomen.  General surgery has scheduled drain placement by interventional radiology.  Patient remains on IV antibiotics.  Patient is upset because he is hungry and wants to eat    Review of Systems   History obtained from the patient  General ROS:  Negative for - chills, fever, malaise, or night sweats  Psychological ROS: negative for - anxiety, depression, hallucinations, or mood swings  Ophthalmic ROS: negative for - blurry vision, double vision, or itchy eyes  ENT ROS: negative for - headaches, nasal  congestion, sneezing, or sore throat  Hematological and Lymphatic ROS: negative for - bleeding problems, bruising, or jaundice  Endocrine ROS: negative for - malaise/lethargy, polydipsia/polyuria, or temperature intolerance  Respiratory ROS: negative for - cough, orthopnea, shortness of breath, sputum changes, tachypnea, or wheezing  Cardiovascular ROS: negative for - chest pain, dyspnea on exertion, edema, palpitations, or paroxysmal nocturnal dyspnea  Gastrointestinal ROS: Positive for abdominal pain, positive for abdominal redness   Genito-Urinary ROS: negative for - change in urinary stream, hematuria, incontinence, or urinary frequency/urgency  Musculoskeletal ROS: negative for - joint stiffness, joint swelling, muscle pain, or muscular weakness  Neurological ROS: negative for - confusion, dizziness, gait disturbance, headaches, impaired coordination/balance, memory loss, numbness/tingling, seizures, or visual changes  Dermatological ROS: negative for dry skin and rash       Objective   Objective     Vitals:   Temp:  [98.1 °F (36.7 °C)-98.4 °F (36.9 °C)] 98.2 °F (36.8 °C)  Heart Rate:  [66-93] 68  Resp:  [16-18] 16  BP: (102-157)/(54-95) 115/68  Physical Exam    Constitutional: Awake, alert, no acute distress resting in bed with wife at bedside   Eyes: Pupils equal, sclerae anicteric, no conjunctival injection   HENT: NCAT, mucous membranes moist   Neck: Supple,   Respiratory: Clear to auscultation bilaterally, nonlabored respirations no wheezing rales or rhonchi   Cardiovascular: RRR, no murmurs, rubs, or gallops, palpable pedal pulses bilaterally   Gastrointestinal: Positive bowel sounds, soft,  Periumbilical redness, swelling, induration.  Minimal tenderness to palpation   Musculoskeletal: No bilateral ankle edema, no clubbing or cyanosis to extremities   Psychiatric: Appropriate affect, cooperative   Neurologic: Oriented x 3, strength symmetric in all extremities, Cranial Nerves grossly intact to  confrontation, speech clear   Skin: No rashes     Result Review    Result Review:  I have personally reviewed the results from the time of this admission to 3/8/2022 12:57 EST and agree with these findings:  [x]  Laboratory   CBC    CBC 11/28/21 3/7/22 3/8/22   WBC 10.82 (A) 13.56 (A) 11.88 (A)   RBC 4.07 (A) 4.86 4.29   Hemoglobin 12.2 (A) 14.6 12.8 (A)   Hematocrit 37.2 (A) 45.3 39.9   MCV 91.4 93.2 93.0   MCH 30.0 30.0 29.8   MCHC 32.8 32.2 32.1   RDW 14.2 14.8 15.0   Platelets 362 290 236   (A) Abnormal value            BMP    BMP 11/26/21 11/27/21 3/7/22   BUN 9 8 8   Creatinine 0.61 (A) 0.60 (A) 0.87   Sodium 137 137 144   Potassium 4.2 4.1 4.0   Chloride 103 104 105   CO2 22.2 21.2 (A) 26.3   Calcium 9.0 8.4 (A) 10.0   (A) Abnormal value              [x]  Microbiology   No results found for: ACANTHNAEG, AFBCX, BPERTUSSISCX, BLOODCX  No results found for: BCIDPCR, CXREFLEX, CSFCX, CULTURETIS  No results found for: CULTURES, HSVCX, URCX  No results found for: EYECULTURE, GCCX, HSVCULTURE, LABHSV  No results found for: LEGIONELLA, MRSACX, MUMPSCX, MYCOPLASCX  No results found for: NOCARDIACX, STOOLCX  No results found for: THROATCX, UNSTIMCULT, URINECX, CULTURE, VZVCULTUR  No results found for: VIRALCULTU, WOUNDCX    [x]  Radiology   CT Abdomen Pelvis With Contrast    Result Date: 3/7/2022  PROCEDURE: CT ABDOMEN PELVIS W CONTRAST  COMPARISON: [facility], CT, CT ABDOMEN PELVIS W CONTRAST, 11/16/2021, 21:39.  INDICATIONS: abdominal pain  TECHNIQUE: After obtaining the patient's consent, CT images were created with non-ionic intravenous contrast material.   PROTOCOL:   Standard imaging protocol performed    RADIATION:   DLP: 409.5mGy*cm   Automated exposure control was utilized to minimize radiation dose. CONTRAST: 100cc Isovue 370 I.V.  FINDINGS:  There is evidence of prior ventral laparotomy.  Along the under surface of the scar within the subcutaneous tissues, there is an ill-defined fluid  collection measuring up to 4.6 cm diameter at the level of the umbilicus.  There is some peripheral enhancement without an obvious drainable abscess in the subcutaneous tissues.  Deep to the linea alba at this level, there is a partially walled off fluid collection measuring 3.2 x 15 mm best seen on axial image 65 within the peritoneal cavity.  Just deep to this, there is a 5.3 x 1.0 cm band like tract of enhancing granulation tissue.  There is associated omental and mesenteric edema.  There is a small amount of fluid in the right lower quadrant peritoneal cavity.  The remainder of the subcutaneous fat and underlying musculature appear within normal limits.  There are no acute osseous abnormalities or destructive bone lesions.  The liver, gallbladder, bile ducts, pancreas, spleen, stomach, adrenal glands and kidneys appear within normal limits.  There is evidence of aortobifemoral bypass with occlusion of the distal native aorta.  There is no bowel distention.  The prostate and urinary bladder appear within normal limits.  The appendix is not appreciated.  The colon appears within normal limits.  There is no pneumoperitoneum or lymphadenopathy.  The lung bases appear clear.  The heart size is normal.  Distal esophagus is unremarkable.      Impression:   1. Deep to a laparotomy scar, there is subcutaneous fluid and deep to the fluid within the peritoneal cavity, there is a small partially walled-off collection in the peritoneal cavity concerning for abscess or phlegmon with adjacent enhancing granulation tissue.  2. Small amount of reactive omental/mesenteric edema and trace free fluid in the right lower quadrant. 3. Evidence of aortobifemoral bypass with occluded native aorta.  The bypass graft appears patent.     SEVEN HEAD MD       Electronically Signed and Approved By: SEVEN HEAD MD on 3/07/2022 at 21:55               []  EKG/Telemetry   []  Cardiology/Vascular   []  Pathology  []  Old records  []   Other:    Assessment/Plan   Assessment / Plan     Assessment/Plan:   Peritoneal abscess (HCC)    Essential hypertension    Tobacco abuse    Hyperlipidemia    Peripheral vascular disease (HCC)    Cellulitis       PLAN   general surgery following.  Patient is scheduled for drain placement by interventional radiology today  Continue patient on vancomycin and Zosyn  Continue patient on his home Crestor for hyperlipidemia  Continue patient on valsartan for history of hypertension  Continue pain control     Discussed plan with RN.    DVT prophylaxis:  Mechanical DVT prophylaxis orders are present.    CODE STATUS:   Level Of Support Discussed With: Patient  Code Status (Patient has no pulse and is not breathing): CPR (Attempt to Resuscitate)  Medical Interventions (Patient has pulse or is breathing): Full        Electronically signed by Moncho Elmore DO, 03/08/22, 12:57 PM EST.

## 2022-03-08 NOTE — NURSING NOTE
Pt returned to Rad holding s/p CT guided ABD abscess drain placement. VSS. Pt with no c/o pain. LLQ ABD drain site CDI. Report called to floor RN. Drink provided

## 2022-03-08 NOTE — ED PROVIDER NOTES
Time: 7:46 PM EST  Arrived by: private car  Chief Complaint: Incision issue  History provided by: Patient  History is limited by: N/A     History of Present Illness:  Patient is a 41 y.o. year old male that presents to the emergency department with redness in mid portion of incisions laparotomy began 3/4/22. He also has rash, soreness, redness, and hardness in the area. He denies fever, chills, myalgias, HA, and vomiting. There is no opening of the of wound or discharge. Symptoms are localized. He denies Hx of MRSA and IV drug use. He has a PSHx of abd surgery in Nov 21 -  adhesiolysis due to bowel obstruction. He healed well after surgery. He also has a vascular surgery in April which may have caused the obstruction - endograft due to occlusions.     HPI    Similar Symptoms Previously: N/A  Recently seen: Yes      Patient Care Team  Primary Care Provider: Kvng De Luna MD    Past Medical History:     No Known Allergies  Past Medical History:   Diagnosis Date   • HLD (hyperlipidemia)    • Hypertension    • S/P appendectomy 11/22/2021   • Vascular disease      Past Surgical History:   Procedure Laterality Date   • AORTA BIFEMORAL BYPASS     • EXPLORATORY LAPAROTOMY N/A 11/21/2021    Procedure: EXPLORATORY LAPAROTOMY, LYSIS OF ADHESIONS,  APPENDECTOMY,;  Surgeon: Siddhartha Adams MD;  Location: [facility] OR;  Service: General;  Laterality: N/A;   • HERNIA REPAIR       Family History   Problem Relation Age of Onset   • Heart disease Father        Home Medications:  Prior to Admission medications    Medication Sig Start Date End Date Taking? Authorizing Provider   aspirin 81 MG EC tablet Take 81 mg by mouth Daily.   Yes Pipe Mijares MD   rosuvastatin (CRESTOR) 10 MG tablet Take 10 mg by mouth Every Night.   Yes Pipe Mijares MD   valsartan (DIOVAN) 160 MG tablet Take 160 mg by mouth Daily.   Yes Pipe Mijares MD   oxyCODONE-acetaminophen (Percocet) 5-325 MG per tablet Take 1-2 tablets by  "mouth Every 4 (Four) Hours As Needed (pain). 11/28/21 3/7/22  Siddhartha Adams MD        Social History:   Social History     Tobacco Use   • Smoking status: Current Every Day Smoker     Packs/day: 1.00   • Smokeless tobacco: Never Used   Substance Use Topics   • Alcohol use: No   • Drug use: No         Review of Systems:  Review of Systems   Constitutional: Negative for chills, diaphoresis and fever.   HENT: Negative for ear discharge and nosebleeds.    Eyes: Negative for photophobia.   Respiratory: Negative for shortness of breath.    Cardiovascular: Negative for chest pain.   Gastrointestinal: Negative for diarrhea, nausea and vomiting.        Abd incision   Genitourinary: Negative for dysuria.   Musculoskeletal: Negative for back pain, myalgias and neck pain.   Skin: Positive for color change and rash.        At Abd incision. +redness, +hardness   Neurological: Negative for headaches.        Physical Exam:  /86 (BP Location: Right arm, Patient Position: Lying)   Pulse 78   Temp 98.4 °F (36.9 °C) (Oral)   Resp 18   Ht 180.3 cm (71\")   Wt 76.7 kg (169 lb 1.5 oz)   SpO2 98%   BMI 23.58 kg/m²     Physical Exam  Vitals and nursing note reviewed.   Constitutional:       General: He is not in acute distress.     Appearance: Normal appearance. He is not ill-appearing, toxic-appearing or diaphoretic.   HENT:      Head: Normocephalic and atraumatic.      Mouth/Throat:      Mouth: Mucous membranes are moist.   Eyes:      General: No scleral icterus.     Extraocular Movements: Extraocular movements intact.   Cardiovascular:      Rate and Rhythm: Normal rate and regular rhythm.      Pulses:           Dorsalis pedis pulses are 1+ on the right side.        Posterior tibial pulses are 2+ on the right side.      Heart sounds: Normal heart sounds. No murmur heard.  Pulmonary:      Effort: Pulmonary effort is normal. No accessory muscle usage, respiratory distress or retractions.      Breath sounds: Normal breath " sounds. No wheezing, rhonchi or rales.   Abdominal:      General: There is no distension.      Palpations: Abdomen is soft. There is no mass.      Comments: Large laparotomy incision located in mid abd area with erythema just under umbilicus. Firm to touch. No obvious gas or pustule. Fluctuant and tender to touch. Only tender at site of erythema and fluctuance, otherwise not tender.   Musculoskeletal:         General: Normal range of motion.      Cervical back: Normal range of motion and neck supple.      Right lower leg: Edema present.      Left lower leg: Edema present.   Skin:     General: Skin is warm and dry.      Findings: No rash.   Neurological:      Mental Status: He is alert. Mental status is at baseline.      Comments: Neuromuscularly intact                Medications in the Emergency Department:  Medications   sodium chloride 0.9 % flush 10 mL (has no administration in time range)   sodium chloride 0.9 % bolus 1,000 mL (0 mL Intravenous Stopped 3/7/22 2130)   piperacillin-tazobactam (ZOSYN) 3.375 g in iso-osmotic dextrose 50 ml (premix) (0 g Intravenous Stopped 3/7/22 2149)   iopamidol (ISOVUE-370) 76 % injection 100 mL (100 mL Intravenous Given 3/7/22 2129)   morphine injection 4 mg (4 mg Intravenous Given 3/7/22 2145)   ondansetron (ZOFRAN) injection 4 mg (4 mg Intravenous Given 3/7/22 2144)   vancomycin 1500 mg/250 mL 0.9% NS IVPB ([facility]) (1,500 mg Intravenous Given 3/7/22 2320)        Labs  Lab Results (last 24 hours)     Procedure Component Value Units Date/Time    CBC & Differential [[id number]]  (Abnormal) Collected: 03/07/22 1840    Specimen: Blood Updated: 03/07/22 2030    Narrative:      The following orders were created for panel order CBC & Differential.  Procedure                               Abnormality         Status                     ---------                               -----------         ------                     CBC Auto Differential[[id number]]        Abnormal            Final  result                 Please view results for these tests on the individual orders.    Comprehensive Metabolic Panel [[id number]] Collected: 03/07/22 1840    Specimen: Blood Updated: 03/07/22 2047     Glucose 99 mg/dL      BUN 8 mg/dL      Creatinine 0.87 mg/dL      Sodium 144 mmol/L      Potassium 4.0 mmol/L      Chloride 105 mmol/L      CO2 26.3 mmol/L      Calcium 10.0 mg/dL      Total Protein 8.0 g/dL      Albumin 4.60 g/dL      ALT (SGPT) 33 U/L      AST (SGOT) 30 U/L      Alkaline Phosphatase 95 U/L      Total Bilirubin 0.2 mg/dL      Globulin 3.4 gm/dL      A/G Ratio 1.4 g/dL      BUN/Creatinine Ratio 9.2     Anion Gap 12.7 mmol/L      eGFR 111.2 mL/min/1.73      Comment: National Kidney Foundation and American Society of Nephrology (ASN) Task Force recommended calculation based on the Chronic Kidney Disease Epidemiology Collaboration (CKD-EPI) equation refit without adjustment for race.       Narrative:      GFR Normal >60  Chronic Kidney Disease <60  Kidney Failure <15      CBC Auto Differential [[id number]]  (Abnormal) Collected: 03/07/22 1840    Specimen: Blood Updated: 03/07/22 2030     WBC 13.56 10*3/mm3      RBC 4.86 10*6/mm3      Hemoglobin 14.6 g/dL      Hematocrit 45.3 %      MCV 93.2 fL      MCH 30.0 pg      MCHC 32.2 g/dL      RDW 14.8 %      RDW-SD 50.6 fl      MPV 10.8 fL      Platelets 290 10*3/mm3      Neutrophil % 66.0 %      Lymphocyte % 24.6 %      Monocyte % 5.7 %      Eosinophil % 2.8 %      Basophil % 0.6 %      Immature Grans % 0.3 %      Neutrophils, Absolute 8.95 10*3/mm3      Lymphocytes, Absolute 3.34 10*3/mm3      Monocytes, Absolute 0.77 10*3/mm3      Eosinophils, Absolute 0.38 10*3/mm3      Basophils, Absolute 0.08 10*3/mm3      Immature Grans, Absolute 0.04 10*3/mm3      nRBC 0.0 /100 WBC     Blood Culture - Blood, Arm, Left [[id number]] Collected: 03/07/22 2117    Specimen: Blood from Arm, Left Updated: 03/07/22 2124    Lactic Acid, Plasma [[id number]]  (Normal) Collected:  03/07/22 2117    Specimen: Blood Updated: 03/07/22 2144     Lactate 1.2 mmol/L     Blood Culture - Blood, Arm, Left [[id number]] Collected: 03/07/22 2131    Specimen: Blood from Arm, Left Updated: 03/07/22 2139    aPTT [[id number]] Collected: 03/07/22 2319    Specimen: Blood from Arm, Left Updated: 03/07/22 2324    Protime-INR [[id number]] Collected: 03/07/22 2319    Specimen: Blood from Arm, Left Updated: 03/07/22 2324           Imaging:  CT Abdomen Pelvis With Contrast    Result Date: 3/7/2022  PROCEDURE: CT ABDOMEN PELVIS W CONTRAST  COMPARISON: [facility], CT, CT ABDOMEN PELVIS W CONTRAST, 11/16/2021, 21:39.  INDICATIONS: abdominal pain  TECHNIQUE: After obtaining the patient's consent, CT images were created with non-ionic intravenous contrast material.   PROTOCOL:   Standard imaging protocol performed    RADIATION:   DLP: 409.5mGy*cm   Automated exposure control was utilized to minimize radiation dose. CONTRAST: 100cc Isovue 370 I.V.  FINDINGS:  There is evidence of prior ventral laparotomy.  Along the under surface of the scar within the subcutaneous tissues, there is an ill-defined fluid collection measuring up to 4.6 cm diameter at the level of the umbilicus.  There is some peripheral enhancement without an obvious drainable abscess in the subcutaneous tissues.  Deep to the linea alba at this level, there is a partially walled off fluid collection measuring 3.2 x 15 mm best seen on axial image 65 within the peritoneal cavity.  Just deep to this, there is a 5.3 x 1.0 cm band like tract of enhancing granulation tissue.  There is associated omental and mesenteric edema.  There is a small amount of fluid in the right lower quadrant peritoneal cavity.  The remainder of the subcutaneous fat and underlying musculature appear within normal limits.  There are no acute osseous abnormalities or destructive bone lesions.  The liver, gallbladder, bile ducts, pancreas, spleen, stomach, adrenal glands and  kidneys appear within normal limits.  There is evidence of aortobifemoral bypass with occlusion of the distal native aorta.  There is no bowel distention.  The prostate and urinary bladder appear within normal limits.  The appendix is not appreciated.  The colon appears within normal limits.  There is no pneumoperitoneum or lymphadenopathy.  The lung bases appear clear.  The heart size is normal.  Distal esophagus is unremarkable.        1. Deep to a laparotomy scar, there is subcutaneous fluid and deep to the fluid within the peritoneal cavity, there is a small partially walled-off collection in the peritoneal cavity concerning for abscess or phlegmon with adjacent enhancing granulation tissue.  2. Small amount of reactive omental/mesenteric edema and trace free fluid in the right lower quadrant. 3. Evidence of aortobifemoral bypass with occluded native aorta.  The bypass graft appears patent.     SEVEN HEAD MD       Electronically Signed and Approved By: SEVEN HEAD MD on 3/07/2022 at 21:55               Procedures:  Procedures    Progress                            Medical Decision Making:  MDM  Number of Diagnoses or Management Options     Amount and/or Complexity of Data Reviewed  Clinical lab tests: reviewed  Tests in the radiology section of CPT®: reviewed  Tests in the medicine section of CPT®: reviewed  Discuss the patient with other providers: yes (I discussed the case with the general surgeon, Dr. Kirkland.  Reviewed the patient's history, laboratory findings and CT findings.  She request the patient be admitted to the hospital under the hospitalist and she will see the patient in consultation    I discussed the case with with Dr. Henriquez who will see and evaluate the patient in the hospital)                 Final diagnoses:   Peritoneal abscess (HCC)        Disposition:  ED Disposition     ED Disposition   Decision to Admit    Condition   --    Comment   --             Documentation assistance  provided by Alex Ellsworth acting as scribe for Jose Maria Elise DO. Information recorded by the scribe was done at my direction and has been verified and validated by me.          Alex Ellsworth  03/07/22 2039       Jose Maria Elise DO  03/09/22 0027

## 2022-03-09 VITALS
TEMPERATURE: 98.7 F | BODY MASS INDEX: 23.67 KG/M2 | HEIGHT: 71 IN | OXYGEN SATURATION: 97 % | RESPIRATION RATE: 18 BRPM | DIASTOLIC BLOOD PRESSURE: 80 MMHG | WEIGHT: 169.09 LBS | SYSTOLIC BLOOD PRESSURE: 140 MMHG | HEART RATE: 68 BPM

## 2022-03-09 LAB
ANION GAP SERPL CALCULATED.3IONS-SCNC: 11.2 MMOL/L (ref 5–15)
BASOPHILS # BLD AUTO: 0.05 10*3/MM3 (ref 0–0.2)
BASOPHILS NFR BLD AUTO: 0.6 % (ref 0–1.5)
BUN SERPL-MCNC: 10 MG/DL (ref 6–20)
BUN/CREAT SERPL: 11.2 (ref 7–25)
CALCIUM SPEC-SCNC: 9 MG/DL (ref 8.6–10.5)
CHLORIDE SERPL-SCNC: 107 MMOL/L (ref 98–107)
CO2 SERPL-SCNC: 23.8 MMOL/L (ref 22–29)
CREAT SERPL-MCNC: 0.89 MG/DL (ref 0.76–1.27)
DEPRECATED RDW RBC AUTO: 50.1 FL (ref 37–54)
EGFRCR SERPLBLD CKD-EPI 2021: 110.4 ML/MIN/1.73
EOSINOPHIL # BLD AUTO: 0.29 10*3/MM3 (ref 0–0.4)
EOSINOPHIL NFR BLD AUTO: 3.6 % (ref 0.3–6.2)
ERYTHROCYTE [DISTWIDTH] IN BLOOD BY AUTOMATED COUNT: 14.6 % (ref 12.3–15.4)
GLUCOSE SERPL-MCNC: 133 MG/DL (ref 65–99)
HCT VFR BLD AUTO: 40 % (ref 37.5–51)
HGB BLD-MCNC: 13 G/DL (ref 13–17.7)
IMM GRANULOCYTES # BLD AUTO: 0.03 10*3/MM3 (ref 0–0.05)
IMM GRANULOCYTES NFR BLD AUTO: 0.4 % (ref 0–0.5)
LYMPHOCYTES # BLD AUTO: 2.47 10*3/MM3 (ref 0.7–3.1)
LYMPHOCYTES NFR BLD AUTO: 31 % (ref 19.6–45.3)
MCH RBC QN AUTO: 30 PG (ref 26.6–33)
MCHC RBC AUTO-ENTMCNC: 32.5 G/DL (ref 31.5–35.7)
MCV RBC AUTO: 92.4 FL (ref 79–97)
MONOCYTES # BLD AUTO: 0.57 10*3/MM3 (ref 0.1–0.9)
MONOCYTES NFR BLD AUTO: 7.2 % (ref 5–12)
NEUTROPHILS NFR BLD AUTO: 4.56 10*3/MM3 (ref 1.7–7)
NEUTROPHILS NFR BLD AUTO: 57.2 % (ref 42.7–76)
NRBC BLD AUTO-RTO: 0 /100 WBC (ref 0–0.2)
PLATELET # BLD AUTO: 241 10*3/MM3 (ref 140–450)
PMV BLD AUTO: 10.5 FL (ref 6–12)
POTASSIUM SERPL-SCNC: 4 MMOL/L (ref 3.5–5.2)
RBC # BLD AUTO: 4.33 10*6/MM3 (ref 4.14–5.8)
SODIUM SERPL-SCNC: 142 MMOL/L (ref 136–145)
VANCOMYCIN TROUGH SERPL-MCNC: 20.85 MCG/ML (ref 5–20)
WBC NRBC COR # BLD: 7.97 10*3/MM3 (ref 3.4–10.8)

## 2022-03-09 PROCEDURE — 25010000002 DIPHENHYDRAMINE PER 50 MG: Performed by: FAMILY MEDICINE

## 2022-03-09 PROCEDURE — 94799 UNLISTED PULMONARY SVC/PX: CPT

## 2022-03-09 PROCEDURE — 96376 TX/PRO/DX INJ SAME DRUG ADON: CPT

## 2022-03-09 PROCEDURE — 80202 ASSAY OF VANCOMYCIN: CPT | Performed by: FAMILY MEDICINE

## 2022-03-09 PROCEDURE — 25010000002 PIPERACILLIN SOD-TAZOBACTAM PER 1 G: Performed by: FAMILY MEDICINE

## 2022-03-09 PROCEDURE — 80048 BASIC METABOLIC PNL TOTAL CA: CPT | Performed by: FAMILY MEDICINE

## 2022-03-09 PROCEDURE — 99217 PR OBSERVATION CARE DISCHARGE MANAGEMENT: CPT | Performed by: INTERNAL MEDICINE

## 2022-03-09 PROCEDURE — 85025 COMPLETE CBC W/AUTO DIFF WBC: CPT | Performed by: NURSE PRACTITIONER

## 2022-03-09 PROCEDURE — G0378 HOSPITAL OBSERVATION PER HR: HCPCS

## 2022-03-09 PROCEDURE — 25010000002 VANCOMYCIN 5 G RECONSTITUTED SOLUTION: Performed by: FAMILY MEDICINE

## 2022-03-09 RX ORDER — LEVOFLOXACIN 750 MG/1
750 TABLET ORAL DAILY
Qty: 7 TABLET | Refills: 0 | Status: SHIPPED | OUTPATIENT
Start: 2022-03-09 | End: 2022-03-16

## 2022-03-09 RX ORDER — DOXYCYCLINE HYCLATE 100 MG/1
100 CAPSULE ORAL 2 TIMES DAILY
Qty: 14 CAPSULE | Refills: 0 | Status: SHIPPED | OUTPATIENT
Start: 2022-03-09 | End: 2022-03-16

## 2022-03-09 RX ADMIN — TAZOBACTAM SODIUM AND PIPERACILLIN SODIUM 3.38 G: 375; 3 INJECTION, SOLUTION INTRAVENOUS at 14:14

## 2022-03-09 RX ADMIN — TAZOBACTAM SODIUM AND PIPERACILLIN SODIUM 3.38 G: 375; 3 INJECTION, SOLUTION INTRAVENOUS at 05:25

## 2022-03-09 RX ADMIN — DIPHENHYDRAMINE HYDROCHLORIDE 50 MG: 50 INJECTION, SOLUTION INTRAMUSCULAR; INTRAVENOUS at 10:48

## 2022-03-09 RX ADMIN — VANCOMYCIN HYDROCHLORIDE 1250 MG: 5 INJECTION, POWDER, LYOPHILIZED, FOR SOLUTION INTRAVENOUS at 10:49

## 2022-03-09 RX ADMIN — VALSARTAN 160 MG: 80 TABLET, FILM COATED ORAL at 08:16

## 2022-03-09 RX ADMIN — SENNOSIDES AND DOCUSATE SODIUM 2 TABLET: 50; 8.6 TABLET ORAL at 08:16

## 2022-03-09 NOTE — DISCHARGE SUMMARY
[facility]         HOSPITALIST  DISCHARGE SUMMARY    Patient Name: Luis Angel Jade  : 1980  MRN: [id number]    Date of Admission: 3/7/2022  Date of Discharge:  3/9/2022    Primary Care Physician: Kvng De Luna MD    Consults     Date and Time Order Name Status Description    3/7/2022 11:03 PM Inpatient Hospitalist Consult      3/7/2022 10:49 PM Inpatient General Surgery Consult Completed           Active and Resolved Hospital Problems:  Active Hospital Problems    Diagnosis POA   • **Peritoneal abscess (HCC) [K65.1] Yes   • Cellulitis [L03.90] Yes   • Essential hypertension [I10] Yes   • Tobacco abuse [Z72.0] Yes   • Hyperlipidemia [E78.5] Yes   • Peripheral vascular disease (HCC) [I73.9] Yes      Resolved Hospital Problems   No resolved problems to display.       Hospital Course     Hospital Course:  Luis Angel Jade is a 41 y.o. male who presents with abdominal pain, swelling.  Patient has a history of hypertension, hyperlipidemia, peripheral vascular disease, tobacco abuse. Patient presents to the emergency department complaining of 4 days of midline abdominal pain with redness, swelling.  The patient has a history of laparotomy in the past with aortofemoral bypass, addition to appendectomy and small bowel obstruction with lysis of adhesions.  Patient reports he noted swelling and redness around his umbilicus with tenderness.  No known fevers or chills.  Reports he had increasing redness and induration, and for this reason he presented to the emergency department for evaluation. On arrival to the ED the patient was noted to have normal vital signs.  He was found to have an elevated white blood cell count at 13,500.  The remainder of his blood work was normal.  CT of the abdomen and pelvis showed a subcutaneous fluid collection in addition to a peritoneal fluid collection.  General surgery was consulted, requested hospitalist admission.  Patient was started on vancomycin and Zosyn.   Patient was seen by general surgery and patient went down to interventional radiology to have a drain placement.  Patient states he feels significantly better and request discharge home.  Patient's white count is now normal.  Patient's cultures are still pending.  Surgery is okay with patient going home to continue on oral antibiotics and outpatient follow-up in 1 week.  Patient is supposed to keep the drain in place.  Patient to be discharged home in stable condition.           Day of Discharge     Vital Signs:  Temp:  [97.9 °F (36.6 °C)-98.9 °F (37.2 °C)] 98.7 °F (37.1 °C)  Heart Rate:  [54-78] 68  Resp:  [14-21] 18  BP: (102-149)/(60-87) 140/80  Flow (L/min):  [2] 2  Physical Exam:          Constitutional: Awake, alert, no acute distress resting in bed               Eyes: Pupils equal, sclerae anicteric, no conjunctival injection              HENT: NCAT, mucous membranes moist              Neck: Supple,              Respiratory: Clear to auscultation bilaterally, nonlabored respirations no wheezing rales or rhonchi              Cardiovascular: RRR, no murmurs, rubs, or gallops, palpable pedal pulses bilaterally              Gastrointestinal: Positive bowel sounds, soft,  Periumbilical redness, has resolved.  Tenderness to palpation has also resolved              Musculoskeletal: No bilateral ankle edema, no clubbing or cyanosis to extremities              Psychiatric: Appropriate affect, cooperative              Neurologic: Oriented x 3, strength symmetric in all extremities, Cranial Nerves grossly intact to confrontation, speech clear              Skin: j/p drain in place with minimal amount of serosanguineous drainage    Discharge Details        Discharge Medications      New Medications      Instructions Start Date   doxycycline 100 MG capsule  Commonly known as: VIBRAMYCIN   100 mg, Oral, 2 Times Daily      levoFLOXacin 750 MG tablet  Commonly known as: Levaquin   750 mg, Oral, Daily         Continue These  Medications      Instructions Start Date   aspirin 81 MG EC tablet   81 mg, Oral, Nightly      rosuvastatin 10 MG tablet  Commonly known as: CRESTOR   10 mg, Oral, Nightly      valsartan 160 MG tablet  Commonly known as: DIOVAN   160 mg, Oral, Nightly             Allergies   Allergen Reactions   • Vancomycin Other (See Comments)     Redmans-itching with redness       Discharge Disposition:  Home or Self Care    Diet:  Hospital:  Diet Order   Procedures   • Diet Regular       Discharge Activity: as tolerated       CODE STATUS:  Code Status and Medical Interventions:   Ordered at: 03/07/22 3768     Level Of Support Discussed With:    Patient     Code Status (Patient has no pulse and is not breathing):    CPR (Attempt to Resuscitate)     Medical Interventions (Patient has pulse or is breathing):    Full         No future appointments.    Additional Instructions for the Follow-ups that You Need to Schedule     Discharge Follow-up with PCP   As directed       Currently Documented PCP:    Kvng De Luna MD    PCP Phone Number:    [phone]     Follow Up Details: in 1 week         Discharge Follow-up with Specified Provider: General Surgery; 2 Weeks   As directed      To: General Surgery    Follow Up: 2 Weeks               Pertinent  and/or Most Recent Results     PROCEDURES:   IR/drain placement     LAB RESULTS:      Lab 03/09/22  0436 03/08/22  1103 03/08/22  0458 03/07/22  2319 03/07/22  2117 03/07/22  1840   WBC 7.97  --  11.88*  --   --  13.56*   HEMOGLOBIN 13.0  --  12.8*  --   --  14.6   HEMATOCRIT 40.0  --  39.9  --   --  45.3   PLATELETS 241  --  236  --   --  290   NEUTROS ABS 4.56  --  7.39*  --   --  8.95*   IMMATURE GRANS (ABS) 0.03  --  0.10*  --   --  0.04   LYMPHS ABS 2.47  --  3.25*  --   --  3.34*   MONOS ABS 0.57  --  0.81  --   --  0.77   EOS ABS 0.29  --  0.27  --   --  0.38   MCV 92.4  --  93.0  --   --  93.2   LACTATE  --   --   --   --  1.2  --    PROTIME  --  10.1  --  9.6  --   --    APTT   --   --   --  25.4  --   --          Lab 03/09/22  0436 03/07/22  1840   SODIUM 142 144   POTASSIUM 4.0 4.0   CHLORIDE 107 105   CO2 23.8 26.3   ANION GAP 11.2 12.7   BUN 10 8   CREATININE 0.89 0.87   EGFR 110.4 111.2   GLUCOSE 133* 99   CALCIUM 9.0 10.0         Lab 03/07/22  1840   TOTAL PROTEIN 8.0   ALBUMIN 4.60   GLOBULIN 3.4   ALT (SGPT) 33   AST (SGOT) 30   BILIRUBIN 0.2   ALK PHOS 95         Lab 03/08/22  1103 03/07/22  2319   PROTIME 10.1 9.6   INR 0.95* 0.90*                 Brief Urine Lab Results  (Last result in the past 365 days)      Color   Clarity   Blood   Leuk Est   Nitrite   Protein   CREAT   Urine HCG        11/26/21 1408 Dark Yellow   Clear   Negative   Negative   Negative   Trace               Microbiology Results (last 10 days)     Procedure Component Value - Date/Time    Body Fluid Culture - Body Fluid, Peritoneum [[id number]] Collected: 03/08/22 1450    Lab Status: Preliminary result Specimen: Body Fluid from Peritoneum Updated: 03/09/22 1138     Body Fluid Culture Growth present, too young to evaluate     Gram Stain Moderate (3+) WBCs seen      Few (2+) Gram positive cocci in pairs and chains    Blood Culture - Blood, Arm, Left [[id number]]  (Normal) Collected: 03/07/22 2131    Lab Status: Preliminary result Specimen: Blood from Arm, Left Updated: 03/08/22 2147     Blood Culture No growth at 24 hours    Blood Culture - Blood, Arm, Left [[id number]]  (Normal) Collected: 03/07/22 2117    Lab Status: Preliminary result Specimen: Blood from Arm, Left Updated: 03/08/22 2133     Blood Culture No growth at 24 hours          CT Abdomen Pelvis With Contrast    Result Date: 3/7/2022  Impression:   1. Deep to a laparotomy scar, there is subcutaneous fluid and deep to the fluid within the peritoneal cavity, there is a small partially walled-off collection in the peritoneal cavity concerning for abscess or phlegmon with adjacent enhancing granulation tissue.  2. Small amount of reactive omental/mesenteric  edema and trace free fluid in the right lower quadrant. 3. Evidence of aortobifemoral bypass with occluded native aorta.  The bypass graft appears patent.     SEVEN HEAD MD       Electronically Signed and Approved By: SEVEN HEAD MD on 3/07/2022 at 21:55             CT Guided Abscess Drain Subdiaphr Subphren    Result Date: 3/8/2022  Impression:   1. Successful placement of an 8 [place] pigtail drain in the lower abdominal subcutaneous/intraperitoneal abscess 2. Culture results are pending.      Silvio Ewing M.D.       Electronically Signed and Approved By: Silvio Ewing M.D. on 3/08/2022 at 15:22                           Labs Pending at Discharge:  Pending Labs     Order Current Status    Blood Culture - Blood, Arm, Left Preliminary result    Blood Culture - Blood, Arm, Left Preliminary result    Body Fluid Culture - Body Fluid, Peritoneum Preliminary result            Time spent on Discharge including face to face service:  More than 35  minutes    Electronically signed by Moncho Elmore DO, 03/09/22, 2:42 PM EST.

## 2022-03-09 NOTE — PROGRESS NOTES
[facility]     Progress Note    Patient Name: Luis Angel Jade  : 1980  MRN: [id number]    Date of admission: 3/7/2022    Subjective   Subjective     Patient had IR drain placed yesterday.  Feels much better today.  Vital signs stable.  Afebrile.  White blood cell count down to 7 today.  Wants to go home.    Objective   Objective     Review of Systems:    A 10 point review of systems was performed and all are negative except for what is documented in the HPI.     Vitals:   Temp:  [97.9 °F (36.6 °C)-98.9 °F (37.2 °C)] 98 °F (36.7 °C)  Heart Rate:  [54-78] 71  Resp:  [13-21] 18  BP: ()/(55-87) 143/77  Flow (L/min):  [2] 2  Physical Exam    Constitutional: Awake, alert   Eyes: PERRLA    Neck: Supple, trachea midline   Respiratory: respirations even and unlabored   Cardiovascular: RRR   Gastrointestinal: Soft, nontender, nondistended, drain with serosanguineous output.   Psychiatric: Appropriate affect, cooperative   Neurologic: Oriented x 3, speech clear   Skin: No rashes     Result Review    Result Review:  I have personally reviewed the results from the time of this admission to 3/9/2022 09:08 EST and agree with these findings:  [x]  Laboratory  []  Microbiology  []  Radiology  []  EKG/Telemetry   []  Cardiology/Vascular   []  Pathology  []  Old records  []  Other:      Assessment/Plan   Assessment / Plan     Diagnosis   Peritoneal abscess  Status post IR drain placement  Active Hospital Problems:  Active Hospital Problems    Diagnosis    • **Peritoneal abscess (HCC)    • Cellulitis    • Essential hypertension    • Tobacco abuse    • Hyperlipidemia    • Peripheral vascular disease (HCC)        Plan:      Okay to discharge home  Follow-up with Dr. Kirkland in 1 week  Keep drain in place       DVT prophylaxis:  Mechanical DVT prophylaxis orders are present.          This document has been electronically signed by OG Cortes  2022 09:08 EST

## 2022-03-09 NOTE — PROGRESS NOTES
"Pharmacy to Dose Vancomycin Day: 2    Luis Angel Jade is a 41 y.o.male admitted with intra-abdominal infection.  Pharmacy has been consulted to dose IV Vancomycin     Consulting Provider: Florence  Clinical Indication: Intra-abdominal infection  Goal -600 mg/L.hr  Duration of therapy: 7 days    180.3 cm (71\")       03/08/22  0057      Weight: 76.7 kg (169 lb 1.5 oz)         Estimated Creatinine Clearance: 118.5 mL/min (by C-G formula based on SCr of 0.89 mg/dL).  Results from last 7 days   Lab Units 03/09/22  0436 03/07/22  1840   BUN mg/dL 10 8   CREATININE mg/dL 0.89 0.87       HD/PD/CRRT?: No    Lab Results   Component Value Date    WBC 7.97 03/09/2022      Temperature    03/08/22 2206 03/09/22 0219 03/09/22 0705   Temp: 98.9 °F (37.2 °C) 98.7 °F (37.1 °C) 98 °F (36.7 °C)        Contrast Administered: 3/7 isovue 370 100 ml    Relevant Micro:   Microbiology Results (last 10 days)       Procedure Component Value - Date/Time    Body Fluid Culture - Body Fluid, Peritoneum [[id number]] Collected: 03/08/22 1450    Lab Status: Preliminary result Specimen: Body Fluid from Peritoneum Updated: 03/08/22 1632     Body Fluid Culture Abnormal Stain     Gram Stain Moderate (3+) WBCs seen      Few (2+) Gram positive cocci in pairs and chains    Blood Culture - Blood, Arm, Left [[id number]]  (Normal) Collected: 03/07/22 2131    Lab Status: Preliminary result Specimen: Blood from Arm, Left Updated: 03/08/22 2147     Blood Culture No growth at 24 hours    Blood Culture - Blood, Arm, Left [[id number]]  (Normal) Collected: 03/07/22 2117    Lab Status: Preliminary result Specimen: Blood from Arm, Left Updated: 03/08/22 2133     Blood Culture No growth at 24 hours             Relevant Radiology:   3/7 CT Abdomen:Deep to a laparotomy scar, there is subcutaneous fluid and deep to the fluid within the   peritoneal cavity, there is a small partially walled-off collection in the peritoneal cavity   concerning for abscess or phlegmon with " adjacent enhancing granulation tissue.    2. Small amount of reactive omental/mesenteric edema and trace free fluid in the right lower   quadrant.  3. Evidence of aortobifemoral bypass with occluded native aorta.  The bypass graft appears patent    Other Antimicrobial Therapy: 3.375g IV q8h extended infusion    Assessment/Plan  Loading dose: 1500 mg IV x1 in ED @2300  Regimen: 1250 mg IV every 12 hours.  Start time: 02:39 on 03/08/2022  Exposure target: AUC24 (range)400-600 mg/L.hr   AUC24,ss: 549 mg/L.hr  PAUC*: 92 %  Ctrough,ss: 17.3 mg/L  Pconc*: 34 %  Tox.: 13 %      Note: Vanc trough drawn with AM labs instead of accurate ordered time 1 hour prior to dose, according to drawn level current regimen providing AUC within goal, will continue current dosing and check follow-up level in 2 days    Labs ordered: BMP

## 2022-03-09 NOTE — PLAN OF CARE
Goal Outcome Evaluation:  Plan of Care Reviewed With: patient        Progress: no change       Pt continues to require IV abts, césar to bulb suction, moderate output. VSS.

## 2022-03-10 ENCOUNTER — READMISSION MANAGEMENT (OUTPATIENT)
Dept: CALL CENTER | Facility: HOSPITAL | Age: 42
End: 2022-03-10

## 2022-03-10 NOTE — OUTREACH NOTE
Prep Survey    Flowsheet Row Responses   [facility] facility patient discharged from? [facility]   Is LACE score < 7 ? Yes   Emergency Room discharge w/ pulse ox? No   Eligibility Not Eligible   What are the reasons patient is not eligible? Other  [Low risk for readmission]   Does the patient have one of the following disease processes/diagnoses(primary or secondary)? Other   Prep survey completed? Yes          SHAI MILLER - Registered Nurse

## 2022-03-12 LAB
BACTERIA FLD CULT: ABNORMAL
BACTERIA SPEC AEROBE CULT: NORMAL
BACTERIA SPEC AEROBE CULT: NORMAL
GRAM STN SPEC: ABNORMAL
GRAM STN SPEC: ABNORMAL

## 2022-03-14 ENCOUNTER — TELEPHONE (OUTPATIENT)
Dept: SURGERY | Facility: CLINIC | Age: 42
End: 2022-03-14

## 2022-03-14 NOTE — TELEPHONE ENCOUNTER
Per Dr. Henao: As long as not having issues then can keep apt but if starts having fevers or symptoms then come in... we wont do anything differently as long as he is doing well

## 2022-03-14 NOTE — TELEPHONE ENCOUNTER
Patient had peritoneal abscess and had drain placed.  Scheduled for appointment 03/23.  Friday, the drain came out.  Called ER.  Since he left the ER he has only had 10 ml of fluid come out. He doesn't have pain, and abdomen not swelling. Does he need to come in today or tomorrow have this checked.

## 2022-03-22 PROBLEM — E78.5 HYPERLIPIDEMIA: Status: ACTIVE | Noted: 2021-08-16

## 2022-03-22 PROBLEM — I73.9 PERIPHERAL VASCULAR DISEASE (HCC): Status: RESOLVED | Noted: 2022-03-07 | Resolved: 2022-03-22

## 2022-03-22 PROBLEM — M54.50 LOW BACK PAIN: Status: ACTIVE | Noted: 2022-03-22

## 2022-03-22 PROBLEM — Z82.49 FAMILY HISTORY OF CORONARY ARTERIOSCLEROSIS: Status: ACTIVE | Noted: 2021-08-16

## 2022-03-22 PROBLEM — I73.9 PERIPHERAL VASCULAR DISEASE (HCC): Status: ACTIVE | Noted: 2021-08-16

## 2022-03-22 PROBLEM — F17.200 SMOKER: Status: ACTIVE | Noted: 2021-08-16

## 2022-03-22 NOTE — PROGRESS NOTES
"Chief Complaint  Follow-up    Subjective          History of Present Illness  The patient is here to follow up on IR placement of drain.  They are doing well and have no complaints.  Objective   Vital Signs:   Resp 15   Ht 180.3 cm (71\")   Wt 76.7 kg (169 lb)   BMI 23.57 kg/m²     Physical Exam  Vitals and nursing note reviewed.   Constitutional:       General: He is not in acute distress.     Appearance: Normal appearance. He is well-developed.   HENT:      Head: Normocephalic and atraumatic.   Eyes:      Extraocular Movements: Extraocular movements intact.      Pupils: Pupils are equal, round, and reactive to light.   Cardiovascular:      Pulses: Normal pulses.   Pulmonary:      Effort: Pulmonary effort is normal. No retractions.      Breath sounds: Normal air entry. No wheezing.   Abdominal:      General: There is no distension.      Palpations: Abdomen is soft.      Tenderness: There is no abdominal tenderness.      Hernia: No hernia is present.   Musculoskeletal:         General: No swelling or deformity.      Cervical back: Neck supple.   Skin:     General: Skin is warm and dry.      Findings: No erythema.      Comments: Surgical Incision Healing Well   Neurological:      General: No focal deficit present.      Mental Status: He is alert and oriented to person, place, and time.      Motor: Motor function is intact.   Psychiatric:         Mood and Affect: Mood normal.         Thought Content: Thought content normal.            Result Review :                 Assessment and Plan    Diagnoses and all orders for this visit:    1. Peritoneal abscess (HCC) (Primary)    Followup prn    Follow Up   Return if symptoms worsen or fail to improve.  Patient was given instructions and counseling regarding his condition or for health maintenance advice. Please see specific information pulled into the AVS if appropriate.     "

## 2022-03-23 ENCOUNTER — OFFICE VISIT (OUTPATIENT)
Dept: SURGERY | Facility: CLINIC | Age: 42
End: 2022-03-23

## 2022-03-23 VITALS — WEIGHT: 169 LBS | BODY MASS INDEX: 23.66 KG/M2 | RESPIRATION RATE: 15 BRPM | HEIGHT: 71 IN

## 2022-03-23 DIAGNOSIS — K65.1 PERITONEAL ABSCESS: Primary | ICD-10-CM

## 2022-03-23 PROCEDURE — 99212 OFFICE O/P EST SF 10 MIN: CPT | Performed by: SURGERY

## 2022-05-27 ENCOUNTER — TRANSCRIBE ORDERS (OUTPATIENT)
Dept: VASCULAR SURGERY | Facility: HOSPITAL | Age: 42
End: 2022-05-27

## 2022-05-27 DIAGNOSIS — I73.9 CLAUDICATION: Primary | ICD-10-CM

## 2022-06-23 ENCOUNTER — APPOINTMENT (OUTPATIENT)
Dept: CARDIOLOGY | Facility: HOSPITAL | Age: 42
End: 2022-06-23

## 2023-02-20 ENCOUNTER — TELEPHONE (OUTPATIENT)
Dept: VASCULAR SURGERY | Facility: HOSPITAL | Age: 43
End: 2023-02-20
Payer: COMMERCIAL

## 2023-03-24 ENCOUNTER — OFFICE VISIT (OUTPATIENT)
Dept: VASCULAR SURGERY | Facility: HOSPITAL | Age: 43
End: 2023-03-24
Payer: COMMERCIAL

## 2023-03-24 ENCOUNTER — HOSPITAL ENCOUNTER (OUTPATIENT)
Dept: CARDIOLOGY | Facility: HOSPITAL | Age: 43
Discharge: HOME | End: 2023-03-24
Payer: COMMERCIAL

## 2023-03-24 VITALS
TEMPERATURE: 98.1 F | DIASTOLIC BLOOD PRESSURE: 83 MMHG | HEART RATE: 72 BPM | SYSTOLIC BLOOD PRESSURE: 126 MMHG | RESPIRATION RATE: 14 BRPM | OXYGEN SATURATION: 97 %

## 2023-03-24 DIAGNOSIS — I73.9 CLAUDICATION: ICD-10-CM

## 2023-03-24 DIAGNOSIS — F17.210 HEAVY SMOKER (MORE THAN 20 CIGARETTES PER DAY): ICD-10-CM

## 2023-03-24 DIAGNOSIS — I73.9 PERIPHERAL VASCULAR DISEASE: Primary | ICD-10-CM

## 2023-03-24 DIAGNOSIS — Z95.828 S/P AORTOBIFEMORAL BYPASS SURGERY: ICD-10-CM

## 2023-03-24 LAB
BH CV LOWER ARTERIAL LEFT ABI RATIO: 1
BH CV LOWER ARTERIAL LEFT CALF RATIO: 0.9
BH CV LOWER ARTERIAL LEFT DORSALIS PEDIS SYS MAX: 95
BH CV LOWER ARTERIAL LEFT GREAT TOE SYS MAX: 113
BH CV LOWER ARTERIAL LEFT LOW THIGH RATIO: 0.9
BH CV LOWER ARTERIAL LEFT LOW THIGH SYS MAX: 117
BH CV LOWER ARTERIAL LEFT POPLITEAL SYS MAX: 119
BH CV LOWER ARTERIAL LEFT POST TIBIAL SYS MAX: 121
BH CV LOWER ARTERIAL LEFT TBI RATIO: 0.9
BH CV LOWER ARTERIAL RIGHT ABI RATIO: 1
BH CV LOWER ARTERIAL RIGHT CALF RATIO: 1.1
BH CV LOWER ARTERIAL RIGHT DORSALIS PEDIS SYS MAX: 116
BH CV LOWER ARTERIAL RIGHT GREAT TOE SYS MAX: 129
BH CV LOWER ARTERIAL RIGHT LOW THIGH RATIO: 1
BH CV LOWER ARTERIAL RIGHT LOW THIGH SYS MAX: 125
BH CV LOWER ARTERIAL RIGHT POPLITEAL SYS MAX: 134
BH CV LOWER ARTERIAL RIGHT POST TIBIAL SYS MAX: 123
BH CV LOWER ARTERIAL RIGHT TBI RATIO: 1
MAXIMAL PREDICTED HEART RATE: 178 BPM
STRESS TARGET HR: 151 BPM
UPPER ARTERIAL LEFT ARM BRACHIAL SYS MAX: 126 MMHG
UPPER ARTERIAL RIGHT ARM BRACHIAL SYS MAX: 126 MMHG

## 2023-03-24 PROCEDURE — 99212 OFFICE O/P EST SF 10 MIN: CPT | Performed by: NURSE PRACTITIONER

## 2023-03-24 PROCEDURE — 93923 UPR/LXTR ART STDY 3+ LVLS: CPT | Performed by: SURGERY

## 2023-03-24 PROCEDURE — 93923 UPR/LXTR ART STDY 3+ LVLS: CPT

## 2023-03-24 NOTE — PROGRESS NOTES
[facility] Vascular Surgery Office Follow Up Note     Date of Encounter: 03/24/2023     MRN Number: [id number]  Name: Luis Angel Jade  Phone Number:      Referred By: Eunice Rodriguez APRN  PCP: Kvng De Luna MD    Chief Complaint:    Chief Complaint   Patient presents with   • Follow-up     Patient is here as a follow up with arterial dopplers today.        Subjective      History of Present Illness:    Luis Angel Jade is a 42 y.o. male presents for follow-up with a arterial Doppler performed today.  He had a aorta bifemoral bypass and bilateral lower extremity thrombectomy and endarterectomies performed Dr. Gray in April 2021.  He is doing well denies any claudication or ischemic rest pain however does state he gets muscle cramps in the night.  He is a cigarette smoker, smokes approximately 1 pack/day.  No other complaints at this time    Review of Systems:  ROS  Review of Systems   Constitutional: Negative.   HENT: Negative.    Cardiovascular: Negative.    Respiratory: Negative.    Skin: Negative.    Musculoskeletal: Negative.    Gastrointestinal: Negative.    Neurological: Negative.    Psychiatric/Behavioral: Negative.      I have reviewed the following portions of the patient's history: allergies, current medications, past family history, past medical history, past social history, past surgical history and problem list and confirm it's accurate.    Allergies:  Allergies   Allergen Reactions   • Vancomycin Other (See Comments)     Redmans-itching with redness       Medications:      Current Outpatient Medications:   •  aspirin 81 MG EC tablet, Take 1 tablet by mouth Every Night., Disp: , Rfl:   •  rosuvastatin (CRESTOR) 10 MG tablet, Take 1 tablet by mouth Every Night., Disp: , Rfl:   •  valsartan (DIOVAN) 160 MG tablet, Take 1 tablet by mouth Every Night., Disp: , Rfl:     History:   Past Medical History:   Diagnosis Date   • HLD (hyperlipidemia)    • Hypertension    • S/P appendectomy  11/22/2021   • Vascular disease        Past Surgical History:   Procedure Laterality Date   • AORTA BIFEMORAL BYPASS     • EXPLORATORY LAPAROTOMY N/A 11/21/2021    Procedure: EXPLORATORY LAPAROTOMY, LYSIS OF ADHESIONS,  APPENDECTOMY,;  Surgeon: Siddhartha Adams MD;  Location: [facility] MAIN OR;  Service: General;  Laterality: N/A;   • HERNIA REPAIR         Social History     Socioeconomic History   • Marital status:    Tobacco Use   • Smoking status: Every Day     Packs/day: 1.00     Types: Cigarettes   • Smokeless tobacco: Never   Substance and Sexual Activity   • Alcohol use: No   • Drug use: No   • Sexual activity: Defer        Family History   Problem Relation Age of Onset   • Heart disease Father        Objective     Physical Exam:  Vitals:    03/24/23 1453 03/24/23 1454   BP: 126/85 126/83   BP Location: Right arm Left arm   Patient Position: Lying Lying   Cuff Size: Large Adult Large Adult   Pulse: 72    Resp: 14    Temp: 98.1 °F (36.7 °C)    TempSrc: Temporal    SpO2: 97%    PainSc: 0-No pain       There is no height or weight on file to calculate BMI.    Physical Exam  Physical Exam  Constitutional:       Appearance: Normal appearance.   HENT:      Head: Normocephalic.   Cardiovascular:      Rate and Rhythm: Normal rate.      Pulses: Normal pulses.      Comments: Bilateral lower extremity: +2 palpable dorsalis pedis pulse.  Pulmonary:      Effort: Pulmonary effort is normal.   Musculoskeletal:         General: Normal range of motion.      Cervical back: Normal range of motion.   Skin:     General: Skin is warm and dry.      Capillary Refill: Capillary refill takes less than 2 seconds.   Neurological:      General: No focal deficit present.      Mental Status: Alert and oriented to person, place, and time.   Psychiatric:         Mood and Affect: Mood normal.         Behavior: Behavior normal.  Imaging/Labs:  I have reviewed the preliminary results of the arterial Doppler performed today.  The ABIs appear  normal at 1 bilaterally with triphasic waveforms throughout.    Assessment / Plan      Assessment / Plan:  Diagnoses and all orders for this visit:    1. Peripheral vascular disease (HCC) (Primary)    2. S/P aortobifemoral bypass surgery    3. Heavy smoker (more than 20 cigarettes per day)       Mr. Jade is doing well, he had a aorta bifemoral bypass and bilateral lower extremity thrombectomy and endarterectomies performed Dr. Gray in April 2021.  He denies any claudication or rest pain.  The duplex reveals normal ABIs at 1 bilaterally with a triphasic waveforms.    I recommended follow-up in 1 year with repeat arterial Doppler.    I have answered all of his questions and he is in agreement with the plan at this time.  Thank you for allowing me to participate in your patient's care.    Patient Education: Smoking cessation counseled, politely denies at this time.    Follow Up:   No follow-ups on file.   Or sooner for any further concerns or worsening sign and symptoms. If unable to reach us in the office please dial 911 or go to the nearest emergency department.      Eunice FOLEY  [facility] Vascular Surgery

## 2024-03-04 ENCOUNTER — TRANSCRIBE ORDERS (OUTPATIENT)
Dept: VASCULAR SURGERY | Facility: HOSPITAL | Age: 44
End: 2024-03-04
Payer: COMMERCIAL

## 2024-03-04 DIAGNOSIS — I73.9 PVD (PERIPHERAL VASCULAR DISEASE): Primary | ICD-10-CM

## 2024-03-29 ENCOUNTER — HOSPITAL ENCOUNTER (OUTPATIENT)
Dept: CARDIOLOGY | Facility: HOSPITAL | Age: 44
Discharge: HOME | End: 2024-03-29
Payer: COMMERCIAL

## 2024-03-29 ENCOUNTER — OFFICE VISIT (OUTPATIENT)
Dept: VASCULAR SURGERY | Facility: HOSPITAL | Age: 44
End: 2024-03-29
Payer: COMMERCIAL

## 2024-03-29 VITALS
SYSTOLIC BLOOD PRESSURE: 118 MMHG | TEMPERATURE: 98.4 F | DIASTOLIC BLOOD PRESSURE: 74 MMHG | OXYGEN SATURATION: 97 % | RESPIRATION RATE: 14 BRPM | HEART RATE: 66 BPM

## 2024-03-29 DIAGNOSIS — I73.9 PERIPHERAL VASCULAR DISEASE: Primary | ICD-10-CM

## 2024-03-29 DIAGNOSIS — F17.210 CIGARETTE SMOKER: ICD-10-CM

## 2024-03-29 DIAGNOSIS — Z95.828 S/P AORTOBIFEMORAL BYPASS SURGERY: ICD-10-CM

## 2024-03-29 DIAGNOSIS — I73.9 PVD (PERIPHERAL VASCULAR DISEASE): ICD-10-CM

## 2024-03-29 LAB
BH CV LOWER ARTERIAL LEFT ABI RATIO: 0.92
BH CV LOWER ARTERIAL LEFT DORSALIS PEDIS SYS MAX: 116
BH CV LOWER ARTERIAL LEFT GREAT TOE SYS MAX: 112
BH CV LOWER ARTERIAL LEFT LOW THIGH SYS MAX: 138
BH CV LOWER ARTERIAL LEFT POPLITEAL SYS MAX: 123
BH CV LOWER ARTERIAL LEFT POST TIBIAL SYS MAX: 127
BH CV LOWER ARTERIAL LEFT TBI RATIO: 0.81
BH CV LOWER ARTERIAL RIGHT ABI RATIO: 0.96
BH CV LOWER ARTERIAL RIGHT DORSALIS PEDIS SYS MAX: 124
BH CV LOWER ARTERIAL RIGHT GREAT TOE SYS MAX: 108
BH CV LOWER ARTERIAL RIGHT LOW THIGH SYS MAX: 127
BH CV LOWER ARTERIAL RIGHT POPLITEAL SYS MAX: 135
BH CV LOWER ARTERIAL RIGHT POST TIBIAL SYS MAX: 132
BH CV LOWER ARTERIAL RIGHT TBI RATIO: 0.78
UPPER ARTERIAL LEFT ARM BRACHIAL SYS MAX: 138
UPPER ARTERIAL RIGHT ARM BRACHIAL SYS MAX: 130

## 2024-03-29 PROCEDURE — 93923 UPR/LXTR ART STDY 3+ LVLS: CPT

## 2024-03-29 NOTE — PROGRESS NOTES
[facility] Vascular Surgery Office Follow Up Note     Date of Encounter: 03/29/2024     MRN Number: [id number]  Name: Luis Angel Jade  Phone Number:      Referred By: Eunice Rodriguez APRN  PCP: Kvng De Luna MD    Chief Complaint:    Chief Complaint   Patient presents with    Peripheral Vascular Disease     Patient is here as an annual follow up with lower extremity studies        Subjective      History of Present Illness:    Luis Angel Jade is a 43 y.o. male presents for annual follow-up with a arterial Doppler performed today.  He had a aortobifem bypass and bilateral lower extremity thrombectomy with endarterectomies performed Dr. Gray in April 2021.  He denies any ischemic rest pain or calf pain however does state that he has pain in bilateral hips if he walks a significant distance but does not feel it is limiting his activities or work at this time. He is a everyday smoker.  He does take aspirin and statin medication daily.    Review of Systems:  ROS  Review of Systems   Constitutional: Negative.   HENT: Negative.    Cardiovascular: Negative.    Respiratory: Negative.    Skin: Negative.    Musculoskeletal: Negative.    Gastrointestinal: Negative.    Neurological: Negative.    Psychiatric/Behavioral: Negative.      I have reviewed the following portions of the patient's history: problem list, current medications, allergies, past surgical history, past medical history, past social history, past family history, and ROS and confirm it's accurate.    Allergies:  Allergies   Allergen Reactions    Vancomycin Other (See Comments)     Redmans-itching with redness       Medications:      Current Outpatient Medications:     aspirin 81 MG EC tablet, Take 1 tablet by mouth Every Night., Disp: , Rfl:     rosuvastatin (CRESTOR) 10 MG tablet, Take 1 tablet by mouth Every Night., Disp: , Rfl:     valsartan (DIOVAN) 160 MG tablet, Take 1 tablet by mouth Every Night., Disp: , Rfl:     History:   Past Medical  History:   Diagnosis Date    HLD (hyperlipidemia)     Hypertension     S/P appendectomy 11/22/2021    Vascular disease        Past Surgical History:   Procedure Laterality Date    AORTA BIFEMORAL BYPASS      EXPLORATORY LAPAROTOMY N/A 11/21/2021    Procedure: EXPLORATORY LAPAROTOMY, LYSIS OF ADHESIONS,  APPENDECTOMY,;  Surgeon: Siddhartha Adams MD;  Location: [facility] MAIN OR;  Service: General;  Laterality: N/A;    HERNIA REPAIR         Social History     Socioeconomic History    Marital status:    Tobacco Use    Smoking status: Every Day     Current packs/day: 1.00     Types: Cigarettes    Smokeless tobacco: Never   Substance and Sexual Activity    Alcohol use: No    Drug use: No    Sexual activity: Defer        Family History   Problem Relation Age of Onset    Heart disease Father        Objective     Physical Exam:  Vitals:    03/29/24 1104   BP: 118/74   BP Location: Right arm   Patient Position: Sitting   Cuff Size: Large Adult   Pulse: 66   Resp: 14   Temp: 98.4 °F (36.9 °C)   TempSrc: Temporal   SpO2: 97%   PainSc: 0-No pain      There is no height or weight on file to calculate BMI.    Physical Exam  Physical Exam  Constitutional:       Appearance:.   HENT:      Head: Normocephalic and atraumatic.   Eyes:       Pupils: Pupils are equal, round, and reactive to light.   Cardiovascular:      Rate and Rhythm: Normal rate and regular rhythm.   Pulmonary:      Effort: Pulmonary effort is normal.    Abdominal:      General: Abdomen is flat. Bowel sounds are normal.      Palpations: Abdomen is soft.   Musculoskeletal:      Cervical back: Normal range of motion and neck supple.   Skin:     General: Skin is warm and dry.   Neurological:      General: No focal deficit present.      Mental Status: He is alert and oriented to person, place, and time.     Imaging/Labs:  I have reviewed the preliminary results of the arterial Doppler performed today.  The ABIs have mildly decreased since the prior study performed in  2023.  ABIs are 1.9 bilaterally however toe brachial indexes 0.78 and 0.81.    Assessment / Plan      Assessment / Plan:  Diagnoses and all orders for this visit:    1. Peripheral vascular disease (Primary)  -     Duplex Lower Extremity Art / Grafts - Bilateral CAR; Future    2. S/P aortobifemoral bypass surgery  -     Duplex Lower Extremity Art / Grafts - Bilateral CAR; Future    3. Cigarette smoker    Mr. Jade had a aortobifemoral bypass performed by Dr. Gray in 2021, he is having symptoms however he does not feel that they are lifestyle limiting at this time.  I recommend that given his symptoms and the mild decrease in the testing that we follow-up in 6 months with bilateral lower extremity duplex.    I have answered all of his questions and he is in agreement with the plan at this time.  Thank you for allowing me to participate in your patient's care.    Patient Education: Smoking cessation counseled      Follow Up:   No follow-ups on file.   Or sooner for any further concerns or worsening sign and symptoms. If unable to reach us in the office please dial 911 or go to the nearest emergency department.      Eunice FOLEY  [facility] Vascular Surgery

## 2024-09-26 ENCOUNTER — TELEPHONE (OUTPATIENT)
Dept: VASCULAR SURGERY | Facility: HOSPITAL | Age: 44
End: 2024-09-26
Payer: COMMERCIAL

## 2024-11-25 ENCOUNTER — HOSPITAL ENCOUNTER (OUTPATIENT)
Dept: OTHER | Facility: HOSPITAL | Age: 44
Discharge: HOME | End: 2024-11-25
Payer: COMMERCIAL

## 2024-11-26 ENCOUNTER — HOSPITAL ENCOUNTER (INPATIENT)
Facility: HOSPITAL | Age: 44
LOS: 4 days | Discharge: HOME | End: 2024-11-30
Attending: INTERNAL MEDICINE | Admitting: INTERNAL MEDICINE
Payer: COMMERCIAL

## 2024-11-26 DIAGNOSIS — S31.109S WOUND OF LEFT GROIN, SEQUELA: Primary | ICD-10-CM

## 2024-11-26 DIAGNOSIS — L02.91 ABSCESS: ICD-10-CM

## 2024-11-26 DIAGNOSIS — R26.2 DIFFICULTY WALKING: ICD-10-CM

## 2024-11-26 DIAGNOSIS — Z46.89 ENCOUNTER FOR MANAGEMENT OF WOUND VAC: ICD-10-CM

## 2024-11-26 LAB
ALBUMIN SERPL-MCNC: 4 G/DL (ref 3.5–5.2)
ALBUMIN/GLOB SERPL: 1.3 G/DL
ALP SERPL-CCNC: 77 U/L (ref 39–117)
ALT SERPL W P-5'-P-CCNC: 41 U/L (ref 1–41)
ANION GAP SERPL CALCULATED.3IONS-SCNC: 10.1 MMOL/L (ref 5–15)
AST SERPL-CCNC: 29 U/L (ref 1–40)
BASOPHILS # BLD AUTO: 0.05 10*3/MM3 (ref 0–0.2)
BASOPHILS NFR BLD AUTO: 0.5 % (ref 0–1.5)
BILIRUB SERPL-MCNC: 0.5 MG/DL (ref 0–1.2)
BUN SERPL-MCNC: 11 MG/DL (ref 6–20)
BUN/CREAT SERPL: 13.4 (ref 7–25)
CALCIUM SPEC-SCNC: 9.2 MG/DL (ref 8.6–10.5)
CHLORIDE SERPL-SCNC: 105 MMOL/L (ref 98–107)
CO2 SERPL-SCNC: 23.9 MMOL/L (ref 22–29)
CREAT SERPL-MCNC: 0.82 MG/DL (ref 0.76–1.27)
DEPRECATED RDW RBC AUTO: 47.7 FL (ref 37–54)
EGFRCR SERPLBLD CKD-EPI 2021: 111.1 ML/MIN/1.73
EOSINOPHIL # BLD AUTO: 0.35 10*3/MM3 (ref 0–0.4)
EOSINOPHIL NFR BLD AUTO: 3.7 % (ref 0.3–6.2)
ERYTHROCYTE [DISTWIDTH] IN BLOOD BY AUTOMATED COUNT: 13.6 % (ref 12.3–15.4)
GLOBULIN UR ELPH-MCNC: 3.2 GM/DL
GLUCOSE SERPL-MCNC: 103 MG/DL (ref 65–99)
HCT VFR BLD AUTO: 45.8 % (ref 37.5–51)
HGB BLD-MCNC: 14.8 G/DL (ref 13–17.7)
HOLD SPECIMEN: NORMAL
IMM GRANULOCYTES # BLD AUTO: 0.02 10*3/MM3 (ref 0–0.05)
IMM GRANULOCYTES NFR BLD AUTO: 0.2 % (ref 0–0.5)
LYMPHOCYTES # BLD AUTO: 2.59 10*3/MM3 (ref 0.7–3.1)
LYMPHOCYTES NFR BLD AUTO: 27.3 % (ref 19.6–45.3)
MAGNESIUM SERPL-MCNC: 2.2 MG/DL (ref 1.6–2.6)
MCH RBC QN AUTO: 30.5 PG (ref 26.6–33)
MCHC RBC AUTO-ENTMCNC: 32.3 G/DL (ref 31.5–35.7)
MCV RBC AUTO: 94.2 FL (ref 79–97)
MONOCYTES # BLD AUTO: 0.47 10*3/MM3 (ref 0.1–0.9)
MONOCYTES NFR BLD AUTO: 4.9 % (ref 5–12)
NEUTROPHILS NFR BLD AUTO: 6.02 10*3/MM3 (ref 1.7–7)
NEUTROPHILS NFR BLD AUTO: 63.4 % (ref 42.7–76)
NRBC BLD AUTO-RTO: 0 /100 WBC (ref 0–0.2)
PLATELET # BLD AUTO: 192 10*3/MM3 (ref 140–450)
PMV BLD AUTO: 10.6 FL (ref 6–12)
POTASSIUM SERPL-SCNC: 4.1 MMOL/L (ref 3.5–5.2)
PROT SERPL-MCNC: 7.2 G/DL (ref 6–8.5)
RBC # BLD AUTO: 4.86 10*6/MM3 (ref 4.14–5.8)
SODIUM SERPL-SCNC: 139 MMOL/L (ref 136–145)
WBC NRBC COR # BLD AUTO: 9.5 10*3/MM3 (ref 3.4–10.8)
WHOLE BLOOD HOLD COAG: NORMAL

## 2024-11-26 PROCEDURE — 87205 SMEAR GRAM STAIN: CPT | Performed by: INTERNAL MEDICINE

## 2024-11-26 PROCEDURE — 85025 COMPLETE CBC W/AUTO DIFF WBC: CPT | Performed by: INTERNAL MEDICINE

## 2024-11-26 PROCEDURE — 80053 COMPREHEN METABOLIC PANEL: CPT | Performed by: INTERNAL MEDICINE

## 2024-11-26 PROCEDURE — 83735 ASSAY OF MAGNESIUM: CPT | Performed by: INTERNAL MEDICINE

## 2024-11-26 PROCEDURE — 25010000002 ENOXAPARIN PER 10 MG: Performed by: INTERNAL MEDICINE

## 2024-11-26 PROCEDURE — 25010000002 PIPERACILLIN SOD-TAZOBACTAM PER 1 G: Performed by: INTERNAL MEDICINE

## 2024-11-26 PROCEDURE — 87070 CULTURE OTHR SPECIMN AEROBIC: CPT | Performed by: INTERNAL MEDICINE

## 2024-11-26 PROCEDURE — 87040 BLOOD CULTURE FOR BACTERIA: CPT | Performed by: INTERNAL MEDICINE

## 2024-11-26 PROCEDURE — 99223 1ST HOSP IP/OBS HIGH 75: CPT | Performed by: INTERNAL MEDICINE

## 2024-11-26 RX ORDER — SODIUM CHLORIDE 0.9 % (FLUSH) 0.9 %
10 SYRINGE (ML) INJECTION AS NEEDED
Status: DISCONTINUED | OUTPATIENT
Start: 2024-11-26 | End: 2024-11-30 | Stop reason: HOSPADM

## 2024-11-26 RX ORDER — VALSARTAN 80 MG/1
160 TABLET ORAL DAILY
Status: DISCONTINUED | OUTPATIENT
Start: 2024-11-27 | End: 2024-11-30 | Stop reason: HOSPADM

## 2024-11-26 RX ORDER — ASPIRIN 81 MG/1
81 TABLET ORAL NIGHTLY
Status: DISCONTINUED | OUTPATIENT
Start: 2024-11-27 | End: 2024-11-27

## 2024-11-26 RX ORDER — ROSUVASTATIN CALCIUM 5 MG/1
10 TABLET, COATED ORAL NIGHTLY
Status: DISCONTINUED | OUTPATIENT
Start: 2024-11-27 | End: 2024-11-30 | Stop reason: HOSPADM

## 2024-11-26 RX ORDER — SODIUM CHLORIDE 0.9 % (FLUSH) 0.9 %
10 SYRINGE (ML) INJECTION EVERY 12 HOURS SCHEDULED
Status: DISCONTINUED | OUTPATIENT
Start: 2024-11-26 | End: 2024-11-30 | Stop reason: HOSPADM

## 2024-11-26 RX ORDER — SODIUM CHLORIDE 9 MG/ML
40 INJECTION, SOLUTION INTRAVENOUS AS NEEDED
Status: DISCONTINUED | OUTPATIENT
Start: 2024-11-26 | End: 2024-11-30 | Stop reason: HOSPADM

## 2024-11-26 RX ORDER — VALSARTAN 160 MG/1
160 TABLET ORAL DAILY
COMMUNITY

## 2024-11-26 RX ORDER — ENOXAPARIN SODIUM 100 MG/ML
30 INJECTION SUBCUTANEOUS DAILY
Status: DISCONTINUED | OUTPATIENT
Start: 2024-11-26 | End: 2024-11-28

## 2024-11-26 RX ADMIN — PIPERACILLIN AND TAZOBACTAM 3.38 G: 3; .375 INJECTION, POWDER, FOR SOLUTION INTRAVENOUS at 23:11

## 2024-11-26 RX ADMIN — ENOXAPARIN SODIUM 30 MG: 100 INJECTION SUBCUTANEOUS at 16:58

## 2024-11-26 RX ADMIN — PIPERACILLIN AND TAZOBACTAM 3.38 G: 3; .375 INJECTION, POWDER, FOR SOLUTION INTRAVENOUS at 16:58

## 2024-11-26 RX ADMIN — ASPIRIN 81 MG: 81 TABLET, COATED ORAL at 23:50

## 2024-11-26 RX ADMIN — DOXYCYCLINE 100 MG: 100 INJECTION, POWDER, LYOPHILIZED, FOR SOLUTION INTRAVENOUS at 16:58

## 2024-11-26 RX ADMIN — ROSUVASTATIN CALCIUM 10 MG: 5 TABLET, FILM COATED ORAL at 23:50

## 2024-11-26 RX ADMIN — Medication 10 ML: at 23:17

## 2024-11-26 NOTE — PLAN OF CARE
Goal Outcome Evaluation:  Plan of Care Reviewed With: patient        Progress: no change  Outcome Evaluation: Pt was admitted to [facility] as a direct admit from [facility] this shift. Pt was A&Ox4 this shift. Also, pt was on room air maintaining stable oxygen saturation. Pt is to be NPO after midnight for a procedure tomorrow. Informed consent has been obtained and placed in patients chart. Wound care completed by Wound/Ostomy RN with appropiate orders placed. Wound culture was obtained and sent to lab as per order. All other vital signs remained stable.

## 2024-11-26 NOTE — PLAN OF CARE
Patient is a 44-year-old male with a past medical history significant for aorto bifemoral bypass grafting at the age of 41 secondary to peripheral vascular disease who presents to the hospital with 5 to 7 days of worsening thigh pain, no obvious cellulitis surrounding this area but has had increased pain over the last several days, denies any fever or chills.  At the outside hospital images were obtained which was significant for a 2.6 cm abscess around the area of previous surgery from years ago, vascular surgery was contacted who recommended transfer to our facility for further evaluation as patient does have a graft in place.  Patient's vital signs were stable, he had mild leukocytosis, he was started on meropenem and doxycycline as he has a reported allergy to vancomycin, he is excepted to our facility and transferred today

## 2024-11-26 NOTE — H&P
[facility] HISTORY AND PHYSICAL  Date: 2024   Patient Name: Luis Angel Jade  : 1980  MRN: [id number]  Primary Care Physician:  Kvng De Luna MD  Date of admission: 2024    Subjective   Subjective     Chief Complaint: Left leg redness    HPI:    Luis Angel Jade is a 44 y.o. male past medical history of dyslipidemia, hypertension and peripheral vascular disease with aortobifemoral bypass who was transferred to outside hospital due to concern for abscess    Patient states that about 1 week ago he started developing some pain in his left femoral area.  It slowly got more red and more painful.  He has had no fevers.  No chills.  Area of initially became swollen and was palpable.  This then became very hard to palpation.  On the way over from transfer the area opened up and started draining green purulent material.  At the outside hospital CT scan showed 2.6 cm abscess.  I will have the nurse get a wound culture.  Already on antibiotics.  Vascular surgery will see him.    All systems reviewed and are as noted above    Personal History     Past Medical History:  Dyslipidemia  Hypertension  Peripheral vascular disease    Past Surgical History:  Aorta bifemoral bypass    Family History:   Heart disease    Social History:   Every day smoker.  No alcohol.    Home Medications:  aspirin and rosuvastatin    Allergies:  Allergies   Allergen Reactions   • Vancomycin Other (See Comments)     Redmans-itching with redness         Objective   Objective     Vitals:   Heart Rate:  [96] 96  Resp:  [18] 18  BP: (118)/(78) 118/78    Physical Exam    Constitutional: Awake, alert, no acute distress   Eyes: Pupils equal, sclerae anicteric, no conjunctival injection   HENT: NCAT, mucous membranes moist   Neck: Supple, no thyromegaly, no lymphadenopathy, trachea midline   Respiratory: Clear to auscultation bilaterally, nonlabored respirations    Cardiovascular: RRR, no murmurs, rubs, or gallops, palpable  pedal pulses bilaterally   Gastrointestinal: Positive bowel sounds, soft, nontender, nondistended   Musculoskeletal: No bilateral ankle edema, no clubbing or cyanosis to extremities   Psychiatric: Appropriate affect, cooperative   Neurologic: Oriented x 3, strength symmetric in all extremities, Cranial Nerves grossly intact to confrontation, speech clear   Skin: Left area has about a 2 inch area that is swollen and red.  It is currently covered due to the fact that it opened up.    Result Review    Result Review:  I have personally reviewed the results from the time of this admission to 11/26/2024 15:05 EST and agree with these findings:  Reviewed outside hospital images.    Assessment & Plan   Full code assessment / Plan     Assessment/Plan:   Left thigh abscess  Peripheral vascular disease with graft in that area  Dyslipidemia    Plan:  --Admit to hospital service  -- Consult vascular surgery  -- Will get wound culture as the wound opened up on the drive over  -- Send blood culture  -- CBC/CMP/mag  -- Will start doxycycline due to allergy to vancomycin and Zosyn  -- Make n.p.o. at midnight in case vascular wants to do surgery  -- Restart home medications once reconciled      VTE Prophylaxis:  Lovenox        CODE STATUS:     Full code    Admission Status:  I believe this patient meets admission status.    Electronically signed by Hans Luther MD, 11/26/24, 3:05 PM EST.

## 2024-11-26 NOTE — SIGNIFICANT NOTE
Wound Eval / Progress Noted     [facility]     Patient Name: Luis Angel Jade  : 1980  MRN: [id number]  Today's Date: 2024                 Admit Date: 2024    Visit Dx:  No diagnosis found.      Abscess        Past Medical History:   Diagnosis Date    HLD (hyperlipidemia)     Hypertension     S/P appendectomy 2021    Vascular disease         Past Surgical History:   Procedure Laterality Date    AORTA BIFEMORAL BYPASS      EXPLORATORY LAPAROTOMY N/A 2021    Procedure: EXPLORATORY LAPAROTOMY, LYSIS OF ADHESIONS,  APPENDECTOMY,;  Surgeon: Siddhartha Adams MD;  Location: [facility] MAIN OR;  Service: General;  Laterality: N/A;    HERNIA REPAIR           Physical Assessment:  Wound Left anterior greater trochanter (Active)   Wound Image   24 1503   Dressing Appearance intact;dry 24 1540   Closure None 24 1540   Base red;moist 24 1540   Periwound intact;indurated;redness 24 1540   Periwound Temperature warm 24 1540   Periwound Skin Turgor soft 24 1540   Edges open 24 1540   Drainage Characteristics/Odor other (see comments) 24 1540   Drainage Amount moderate 24 1540   Care, Wound cleansed with;irrigated with;sterile normal saline 24 1540   Dressing Care dressing applied;silver impregnated;hydrofiber;silicone border foam 24 1540   Periwound Care absorptive dressing applied 24 1540        Wound Check / Follow-up:  Patient seen today for a wound consult. Patient is awake, alert and oriented at the time of the assessment. Wife reports that the patient had a vascular procedure approximately 4 years ago to the left groin; over the last week the tissue to the left groin where the scar was located became enflamed and indurated, on the way to the hospital the tissue opened leading to a moderate amount of drainage.    Left groin is reddened and warm to touch, there is a pin hole that drained a moderate amount of seropurulent  drainage, no odor present. Periwound is indurated. Tissue is extremely painful to touch. Cleansed and irrigated the wound but due to the pin hole size of the wound unable to fully visualize and cleanse. Recommending daily dressing changes with silver impregnated hydrofiber to the open tissue.     Vascular surgeon has been consulted.     Short term goals:  regain skin integrity, skin protection, daily dressing change    Sandra Feliz RN    11/26/2024    16:21 EST

## 2024-11-26 NOTE — CONSULTS
[facility]   VASCULAR SURGERY CONSULT    Patient Name: Luis Angel Jade  : 1980  MRN: [id number]  Primary Care Physician:  Kvng De Luna MD  Date of admission: 2024    Subjective   Subjective     Chief Complaint: Left groin abscess in a patient with previous aortobifem bypass    HPI:    Luis Angel Jade is a 44 y.o. male with history of aortobifem bypass in  by Dr. Gray who has continued to smoke and is a .  He developed a left groin abscess approximate 1 week ago which has progressively worsened but thought it might get better and has been taking care of his wife who recently had a left foot surgery however this progressively worsened and became more tender as such he presented to [facility] and was admitted.  Upon further workup with CT scan he was found to have evidence of a focal localized abscess with the general surgeons were reticent to address this given the history of previous aortobifem bypass as such he was transferred to [facility] for further evaluation and management.  He has been on antibiotics since his admission to [facility] yesterday currently on Zosyn and doxycycline.  He states the area started to drain upon his arrival to [facility] earlier today.  A wound culture was obtained.  He denies any recent fevers chills or night sweats.  He states that he does not recall Dr. Gray mentioning to him that smoking was the cause of his previous issues and could lead to limb loss, cardiac and or respiratory compromise and future death with need for extensive redo surgeries and prolonged hospital stays at a tertiary care center should his graft ever become infected.  I have discussed this at length with both the patient as well as his wife at bedside.  She states she did quit at the time of his initial operation 3 years ago.   The patient states he has never had a abscess in the groin like this before however he did have  "an intra-abdominal abscess after an appendectomy 2 years ago requiring CT-guided drainage.  Once again I discussed with the patient the fact that smoking cessation is not an option at this point if he wants to avoid future limb loss, prolonged hospital stays and potential death.  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    Review of Systems    General: Patient denies unintended weight loss or weight gain.  Denies fatigue, and weakness.  Denies any fevers, chills or night sweats.  HEENT: Denies any recent visual or hearing changes.  Denies any headaches, rhinorrhea or epistaxis.  Cardiac: Patient denies any significant chest pain, chest tightness or palpitations.  Denies dyspnea on exertion or denies any paroxysmal nocturnal dyspnea orthopnea.  Respiratory: Patient denies any shortness of breath, cough, sputum production or hemoptysis.    Gastrointestinal: Patient denies any significant changes in appetite, nausea, vomiting or dysphagia.  Denies any hematemesis, melena or hematochezia.  Denies any abdominal pain or tenderness to palpation.  Genitourinary: Patient denies any oliguria, dysuria or hematuria.  Vascular: Patient denies any claudication, rest pain or tissue loss.  Denies any history of thrombosis or embolic phenomenon.  Endocrine: Denies any hot or cold intolerance, is not a diabetic and denies any thyroid problems.  Neurologic: Patient denies any loss of sensation, numbness or tingling.  Denies any syncopal episodes, blackouts or seizure history.  Psychiatric: Patient denies any anxiety, depression or mood disorder.       Personal History     Past Medical History:   Diagnosis Date   • HLD (hyperlipidemia)    • Hypertension    • S/P appendectomy 11/22/2021   • Vascular disease        Past Surgical History:   Procedure Laterality Date   • AORTA " BIFEMORAL BYPASS     • EXPLORATORY LAPAROTOMY N/A 11/21/2021    Procedure: EXPLORATORY LAPAROTOMY, LYSIS OF ADHESIONS,  APPENDECTOMY,;  Surgeon: Siddhartha Adams MD;  Location: [facility] MAIN OR;  Service: General;  Laterality: N/A;   • HERNIA REPAIR         Family History: family history includes Heart disease in his father. Otherwise pertinent FHx was reviewed and not pertinent to current issue.    Social History:  reports that he has been smoking cigarettes. He has never used smokeless tobacco. He reports that he does not drink alcohol and does not use drugs.    Home Medications:  aspirin, rosuvastatin, and valsartan      Allergies:  Allergies   Allergen Reactions   • Vancomycin Other (See Comments)     Redmans-itching with redness       Objective   Objective     Vitals:   Temp:  [97.7 °F (36.5 °C)] 97.7 °F (36.5 °C)  Heart Rate:  [79-96] 79  Resp:  [18] 18  BP: (118-142)/(78-92) 142/92    Physical Exam    General: Awake, alert, NAD   Eyes:  CEDRIC, EOMI, Sclera non-icteric   Neck: Supple, no LAD or carotid bruits present   Lungs: Clear to auscultation B   Heart: RRR   Abdomen: Soft, nontender, nondistended with positive bowel sounds   Ext: No clubbing cyanosis or edema.  B radial and femoral and pedal pulses palp.  Left groin with indurated area of erythema and now started draining some  colored fluid from a punctate opening.  It is very tender to palpation.   Neuro: CN's II-XII grossly intact.  No focal or lateralizing deficits present currently.    Pertinent Lab Data:    CBC:      Lab 11/26/24  1549   WBC 9.50   HEMOGLOBIN 14.8   HEMATOCRIT 45.8   PLATELETS 192   NEUTROS ABS 6.02   IMMATURE GRANS (ABS) 0.02   LYMPHS ABS 2.59   MONOS ABS 0.47   EOS ABS 0.35   MCV 94.2        CMP:        Lab 11/26/24  1549   SODIUM 139   POTASSIUM 4.1   CHLORIDE 105   CO2 23.9   ANION GAP 10.1   BUN 11   CREATININE 0.82   EGFR 111.1   GLUCOSE 103*   CALCIUM 9.2   MAGNESIUM 2.2   TOTAL PROTEIN 7.2   ALBUMIN 4.0   GLOBULIN  "3.2   ALT (SGPT) 41   AST (SGOT) 29   BILIRUBIN 0.5   ALK PHOS 77        No radiology results for the last 7 days      Assessment & Plan   Assessment / Plan     Active Hospital Problems:  Active Hospital Problems    Diagnosis    • **Abscess        Assessment/plan:   Patient is a 44-year-old  who underwent aortobifemoral bypass by Dr. Gray 2021 for significant iliac disease with thrombus at the time and has continued smoking since his procedure now presenting with a left groin abscess which seems superficial and not involving the graft but has been progressively worsening for the better part of the week.  2.  Discussed with patient and the necessity of complete smoking cessation as he is a young patient to his had the gold standard operation of an aortobifem already and this must essentially lasting for the duration of his life.  Also discussed with the patient that if this graft was to become infected he would require extensive surgery for removal of the graft with possible extra anatomical revascularization, prolonged hospital stays with morbidity and high risk of limb loss and death.  This may require higher level of care and transfer to [place] if this was to ever happen and I also discussed this with the patient.  Patient states that he was not told anything about his smoking during his initial operation which I find somewhat hard to believe.  3.  He has a history of staph abscess after appendectomy few years ago and small bowel obstruction requiring CT-guided drainage.  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  4.  Will plan for left groin incision and debridement with possible muscle flap, possible wound VAC and any indicated procedures in the OR tomorrow at 7:15 AM.  5.  Risks including but not limited to bleeding, infection, need for multiple procedures, limb loss, cardiac and respiratory compromise and " death as well as benefits and indications regarding above procedure were discussed with patient and wife at bedside who voiced understanding and willingness to proceed.  All questions were answered.  6.  Past medical history significant for nicotine dependence, hypertension and dyslipidemia.  7.  Will continue to monitor the patient with supportive care.    Thank you for the opportunity see this patient in consultation.    Electronically signed by Elgin Finley MD, 11/26/24, 6:15 PM EST.

## 2024-11-26 NOTE — H&P (VIEW-ONLY)
[facility]   VASCULAR SURGERY CONSULT    Patient Name: Luis Angel Jade  : 1980  MRN: [id number]  Primary Care Physician:  Kvng De Luna MD  Date of admission: 2024    Subjective   Subjective     Chief Complaint: Left groin abscess in a patient with previous aortobifem bypass    HPI:    Luis Angel Jade is a 44 y.o. male with history of aortobifem bypass in  by Dr. Gray who has continued to smoke and is a .  He developed a left groin abscess approximate 1 week ago which has progressively worsened but thought it might get better and has been taking care of his wife who recently had a left foot surgery however this progressively worsened and became more tender as such he presented to [facility] and was admitted.  Upon further workup with CT scan he was found to have evidence of a focal localized abscess with the general surgeons were reticent to address this given the history of previous aortobifem bypass as such he was transferred to [facility] for further evaluation and management.  He has been on antibiotics since his admission to [facility] yesterday currently on Zosyn and doxycycline.  He states the area started to drain upon his arrival to [facility] earlier today.  A wound culture was obtained.  He denies any recent fevers chills or night sweats.  He states that he does not recall Dr. Gray mentioning to him that smoking was the cause of his previous issues and could lead to limb loss, cardiac and or respiratory compromise and future death with need for extensive redo surgeries and prolonged hospital stays at a tertiary care center should his graft ever become infected.  I have discussed this at length with both the patient as well as his wife at bedside.  She states she did quit at the time of his initial operation 3 years ago.   The patient states he has never had a abscess in the groin like this before however he did have  "an intra-abdominal abscess after an appendectomy 2 years ago requiring CT-guided drainage.  Once again I discussed with the patient the fact that smoking cessation is not an option at this point if he wants to avoid future limb loss, prolonged hospital stays and potential death.  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    Review of Systems    General: Patient denies unintended weight loss or weight gain.  Denies fatigue, and weakness.  Denies any fevers, chills or night sweats.  HEENT: Denies any recent visual or hearing changes.  Denies any headaches, rhinorrhea or epistaxis.  Cardiac: Patient denies any significant chest pain, chest tightness or palpitations.  Denies dyspnea on exertion or denies any paroxysmal nocturnal dyspnea orthopnea.  Respiratory: Patient denies any shortness of breath, cough, sputum production or hemoptysis.    Gastrointestinal: Patient denies any significant changes in appetite, nausea, vomiting or dysphagia.  Denies any hematemesis, melena or hematochezia.  Denies any abdominal pain or tenderness to palpation.  Genitourinary: Patient denies any oliguria, dysuria or hematuria.  Vascular: Patient denies any claudication, rest pain or tissue loss.  Denies any history of thrombosis or embolic phenomenon.  Endocrine: Denies any hot or cold intolerance, is not a diabetic and denies any thyroid problems.  Neurologic: Patient denies any loss of sensation, numbness or tingling.  Denies any syncopal episodes, blackouts or seizure history.  Psychiatric: Patient denies any anxiety, depression or mood disorder.       Personal History     Past Medical History:   Diagnosis Date   • HLD (hyperlipidemia)    • Hypertension    • S/P appendectomy 11/22/2021   • Vascular disease        Past Surgical History:   Procedure Laterality Date   • AORTA " BIFEMORAL BYPASS     • EXPLORATORY LAPAROTOMY N/A 11/21/2021    Procedure: EXPLORATORY LAPAROTOMY, LYSIS OF ADHESIONS,  APPENDECTOMY,;  Surgeon: Siddhartha Adams MD;  Location: [facility] MAIN OR;  Service: General;  Laterality: N/A;   • HERNIA REPAIR         Family History: family history includes Heart disease in his father. Otherwise pertinent FHx was reviewed and not pertinent to current issue.    Social History:  reports that he has been smoking cigarettes. He has never used smokeless tobacco. He reports that he does not drink alcohol and does not use drugs.    Home Medications:  aspirin, rosuvastatin, and valsartan      Allergies:  Allergies   Allergen Reactions   • Vancomycin Other (See Comments)     Redmans-itching with redness       Objective   Objective     Vitals:   Temp:  [97.7 °F (36.5 °C)] 97.7 °F (36.5 °C)  Heart Rate:  [79-96] 79  Resp:  [18] 18  BP: (118-142)/(78-92) 142/92    Physical Exam    General: Awake, alert, NAD   Eyes:  CEDRIC, EOMI, Sclera non-icteric   Neck: Supple, no LAD or carotid bruits present   Lungs: Clear to auscultation B   Heart: RRR   Abdomen: Soft, nontender, nondistended with positive bowel sounds   Ext: No clubbing cyanosis or edema.  B radial and femoral and pedal pulses palp.  Left groin with indurated area of erythema and now started draining some  colored fluid from a punctate opening.  It is very tender to palpation.   Neuro: CN's II-XII grossly intact.  No focal or lateralizing deficits present currently.    Pertinent Lab Data:    CBC:      Lab 11/26/24  1549   WBC 9.50   HEMOGLOBIN 14.8   HEMATOCRIT 45.8   PLATELETS 192   NEUTROS ABS 6.02   IMMATURE GRANS (ABS) 0.02   LYMPHS ABS 2.59   MONOS ABS 0.47   EOS ABS 0.35   MCV 94.2        CMP:        Lab 11/26/24  1549   SODIUM 139   POTASSIUM 4.1   CHLORIDE 105   CO2 23.9   ANION GAP 10.1   BUN 11   CREATININE 0.82   EGFR 111.1   GLUCOSE 103*   CALCIUM 9.2   MAGNESIUM 2.2   TOTAL PROTEIN 7.2   ALBUMIN 4.0   GLOBULIN  "3.2   ALT (SGPT) 41   AST (SGOT) 29   BILIRUBIN 0.5   ALK PHOS 77        No radiology results for the last 7 days      Assessment & Plan   Assessment / Plan     Active Hospital Problems:  Active Hospital Problems    Diagnosis    • **Abscess        Assessment/plan:   Patient is a 44-year-old  who underwent aortobifemoral bypass by Dr. Gray 2021 for significant iliac disease with thrombus at the time and has continued smoking since his procedure now presenting with a left groin abscess which seems superficial and not involving the graft but has been progressively worsening for the better part of the week.  2.  Discussed with patient and the necessity of complete smoking cessation as he is a young patient to his had the gold standard operation of an aortobifem already and this must essentially lasting for the duration of his life.  Also discussed with the patient that if this graft was to become infected he would require extensive surgery for removal of the graft with possible extra anatomical revascularization, prolonged hospital stays with morbidity and high risk of limb loss and death.  This may require higher level of care and transfer to [place] if this was to ever happen and I also discussed this with the patient.  Patient states that he was not told anything about his smoking during his initial operation which I find somewhat hard to believe.  3.  He has a history of staph abscess after appendectomy few years ago and small bowel obstruction requiring CT-guided drainage.  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  4.  Will plan for left groin incision and debridement with possible muscle flap, possible wound VAC and any indicated procedures in the OR tomorrow at 7:15 AM.  5.  Risks including but not limited to bleeding, infection, need for multiple procedures, limb loss, cardiac and respiratory compromise and " death as well as benefits and indications regarding above procedure were discussed with patient and wife at bedside who voiced understanding and willingness to proceed.  All questions were answered.  6.  Past medical history significant for nicotine dependence, hypertension and dyslipidemia.  7.  Will continue to monitor the patient with supportive care.    Thank you for the opportunity see this patient in consultation.    Electronically signed by Elgin Finley MD, 11/26/24, 6:15 PM EST.

## 2024-11-27 ENCOUNTER — ANESTHESIA EVENT (OUTPATIENT)
Dept: PERIOP | Facility: HOSPITAL | Age: 44
End: 2024-11-27
Payer: COMMERCIAL

## 2024-11-27 ENCOUNTER — ANESTHESIA (OUTPATIENT)
Dept: PERIOP | Facility: HOSPITAL | Age: 44
End: 2024-11-27
Payer: COMMERCIAL

## 2024-11-27 LAB
ABO GROUP BLD: NORMAL
ABO GROUP BLD: NORMAL
ALBUMIN SERPL-MCNC: 4 G/DL (ref 3.5–5.2)
ALBUMIN/GLOB SERPL: 1.2 G/DL
ALP SERPL-CCNC: 75 U/L (ref 39–117)
ALT SERPL W P-5'-P-CCNC: 45 U/L (ref 1–41)
ANION GAP SERPL CALCULATED.3IONS-SCNC: 10.9 MMOL/L (ref 5–15)
APTT PPP: 31.8 SECONDS (ref 24.2–34.2)
AST SERPL-CCNC: 32 U/L (ref 1–40)
BASOPHILS # BLD AUTO: 0.06 10*3/MM3 (ref 0–0.2)
BASOPHILS NFR BLD AUTO: 0.6 % (ref 0–1.5)
BILIRUB SERPL-MCNC: 0.3 MG/DL (ref 0–1.2)
BLD GP AB SCN SERPL QL: NEGATIVE
BUN SERPL-MCNC: 14 MG/DL (ref 6–20)
BUN/CREAT SERPL: 16.3 (ref 7–25)
CALCIUM SPEC-SCNC: 9.6 MG/DL (ref 8.6–10.5)
CHLORIDE SERPL-SCNC: 107 MMOL/L (ref 98–107)
CO2 SERPL-SCNC: 22.1 MMOL/L (ref 22–29)
CREAT SERPL-MCNC: 0.86 MG/DL (ref 0.76–1.27)
DEPRECATED RDW RBC AUTO: 48.1 FL (ref 37–54)
EGFRCR SERPLBLD CKD-EPI 2021: 109.5 ML/MIN/1.73
EOSINOPHIL # BLD AUTO: 0.48 10*3/MM3 (ref 0–0.4)
EOSINOPHIL NFR BLD AUTO: 4.9 % (ref 0.3–6.2)
ERYTHROCYTE [DISTWIDTH] IN BLOOD BY AUTOMATED COUNT: 13.5 % (ref 12.3–15.4)
GLOBULIN UR ELPH-MCNC: 3.3 GM/DL
GLUCOSE SERPL-MCNC: 109 MG/DL (ref 65–99)
HCT VFR BLD AUTO: 46.3 % (ref 37.5–51)
HGB BLD-MCNC: 14.7 G/DL (ref 13–17.7)
IMM GRANULOCYTES # BLD AUTO: 0.04 10*3/MM3 (ref 0–0.05)
IMM GRANULOCYTES NFR BLD AUTO: 0.4 % (ref 0–0.5)
INR PPP: 0.97 (ref 0.86–1.15)
LYMPHOCYTES # BLD AUTO: 3.63 10*3/MM3 (ref 0.7–3.1)
LYMPHOCYTES NFR BLD AUTO: 37 % (ref 19.6–45.3)
MAGNESIUM SERPL-MCNC: 2.3 MG/DL (ref 1.6–2.6)
MCH RBC QN AUTO: 30.3 PG (ref 26.6–33)
MCHC RBC AUTO-ENTMCNC: 31.7 G/DL (ref 31.5–35.7)
MCV RBC AUTO: 95.5 FL (ref 79–97)
MONOCYTES # BLD AUTO: 0.52 10*3/MM3 (ref 0.1–0.9)
MONOCYTES NFR BLD AUTO: 5.3 % (ref 5–12)
NEUTROPHILS NFR BLD AUTO: 5.07 10*3/MM3 (ref 1.7–7)
NEUTROPHILS NFR BLD AUTO: 51.8 % (ref 42.7–76)
NRBC BLD AUTO-RTO: 0 /100 WBC (ref 0–0.2)
PLATELET # BLD AUTO: 200 10*3/MM3 (ref 140–450)
PMV BLD AUTO: 10.7 FL (ref 6–12)
POTASSIUM SERPL-SCNC: 4.6 MMOL/L (ref 3.5–5.2)
PROT SERPL-MCNC: 7.3 G/DL (ref 6–8.5)
PROTHROMBIN TIME: 13 SECONDS (ref 11.8–14.9)
QT INTERVAL: 371 MS
QTC INTERVAL: 409 MS
RBC # BLD AUTO: 4.85 10*6/MM3 (ref 4.14–5.8)
RH BLD: NEGATIVE
RH BLD: NEGATIVE
SODIUM SERPL-SCNC: 140 MMOL/L (ref 136–145)
T&S EXPIRATION DATE: NORMAL
WBC NRBC COR # BLD AUTO: 9.8 10*3/MM3 (ref 3.4–10.8)

## 2024-11-27 PROCEDURE — 93005 ELECTROCARDIOGRAM TRACING: CPT | Performed by: ANESTHESIOLOGY

## 2024-11-27 PROCEDURE — 25010000002 CEFAZOLIN PER 500 MG

## 2024-11-27 PROCEDURE — 25010000002 PIPERACILLIN SOD-TAZOBACTAM PER 1 G: Performed by: SURGERY

## 2024-11-27 PROCEDURE — 25010000002 PROPOFOL 10 MG/ML EMULSION

## 2024-11-27 PROCEDURE — 25010000002 MIDAZOLAM PER 1MG: Performed by: ANESTHESIOLOGY

## 2024-11-27 PROCEDURE — 94799 UNLISTED PULMONARY SVC/PX: CPT

## 2024-11-27 PROCEDURE — 85610 PROTHROMBIN TIME: CPT | Performed by: SURGERY

## 2024-11-27 PROCEDURE — 86901 BLOOD TYPING SEROLOGIC RH(D): CPT

## 2024-11-27 PROCEDURE — 25010000002 HYDROMORPHONE PER 4 MG

## 2024-11-27 PROCEDURE — 86850 RBC ANTIBODY SCREEN: CPT | Performed by: SURGERY

## 2024-11-27 PROCEDURE — 25810000003 SODIUM CHLORIDE 0.9 % SOLUTION: Performed by: SURGERY

## 2024-11-27 PROCEDURE — 25010000002 LIDOCAINE PF 2% 2 % SOLUTION

## 2024-11-27 PROCEDURE — 86901 BLOOD TYPING SEROLOGIC RH(D): CPT | Performed by: SURGERY

## 2024-11-27 PROCEDURE — 25010000002 HYDROMORPHONE 1 MG/ML SOLUTION

## 2024-11-27 PROCEDURE — 25010000002 SUGAMMADEX 200 MG/2ML SOLUTION

## 2024-11-27 PROCEDURE — 25010000002 DEXAMETHASONE PER 1 MG

## 2024-11-27 PROCEDURE — 94761 N-INVAS EAR/PLS OXIMETRY MLT: CPT

## 2024-11-27 PROCEDURE — 85025 COMPLETE CBC W/AUTO DIFF WBC: CPT | Performed by: INTERNAL MEDICINE

## 2024-11-27 PROCEDURE — 80053 COMPREHEN METABOLIC PANEL: CPT | Performed by: INTERNAL MEDICINE

## 2024-11-27 PROCEDURE — 25010000002 ONDANSETRON PER 1 MG

## 2024-11-27 PROCEDURE — 87070 CULTURE OTHR SPECIMN AEROBIC: CPT | Performed by: SURGERY

## 2024-11-27 PROCEDURE — 25810000003 SODIUM CHLORIDE 0.9 % SOLUTION 500 ML FLEX CONT: Performed by: SURGERY

## 2024-11-27 PROCEDURE — 25810000003 LACTATED RINGERS PER 1000 ML: Performed by: ANESTHESIOLOGY

## 2024-11-27 PROCEDURE — 87075 CULTR BACTERIA EXCEPT BLOOD: CPT | Performed by: SURGERY

## 2024-11-27 PROCEDURE — 99232 SBSQ HOSP IP/OBS MODERATE 35: CPT | Performed by: INTERNAL MEDICINE

## 2024-11-27 PROCEDURE — 87205 SMEAR GRAM STAIN: CPT | Performed by: SURGERY

## 2024-11-27 PROCEDURE — 83735 ASSAY OF MAGNESIUM: CPT | Performed by: INTERNAL MEDICINE

## 2024-11-27 PROCEDURE — 86900 BLOOD TYPING SEROLOGIC ABO: CPT

## 2024-11-27 PROCEDURE — 25010000002 FENTANYL CITRATE (PF) 50 MCG/ML SOLUTION

## 2024-11-27 PROCEDURE — 86900 BLOOD TYPING SEROLOGIC ABO: CPT | Performed by: SURGERY

## 2024-11-27 PROCEDURE — 0J9C0ZX DRAINAGE OF PELVIC REGION SUBCUTANEOUS TISSUE AND FASCIA, OPEN APPROACH, DIAGNOSTIC: ICD-10-PCS | Performed by: SURGERY

## 2024-11-27 PROCEDURE — 25010000002 CEFAZOLIN PER 500 MG: Performed by: SURGERY

## 2024-11-27 PROCEDURE — 85730 THROMBOPLASTIN TIME PARTIAL: CPT | Performed by: SURGERY

## 2024-11-27 RX ORDER — ONDANSETRON 2 MG/ML
4 INJECTION INTRAMUSCULAR; INTRAVENOUS ONCE AS NEEDED
Status: DISCONTINUED | OUTPATIENT
Start: 2024-11-27 | End: 2024-11-27 | Stop reason: HOSPADM

## 2024-11-27 RX ORDER — LIDOCAINE HYDROCHLORIDE 20 MG/ML
INJECTION, SOLUTION EPIDURAL; INFILTRATION; INTRACAUDAL; PERINEURAL AS NEEDED
Status: DISCONTINUED | OUTPATIENT
Start: 2024-11-27 | End: 2024-11-27 | Stop reason: SURG

## 2024-11-27 RX ORDER — DEXMEDETOMIDINE HYDROCHLORIDE 100 UG/ML
INJECTION, SOLUTION INTRAVENOUS AS NEEDED
Status: DISCONTINUED | OUTPATIENT
Start: 2024-11-27 | End: 2024-11-27 | Stop reason: SURG

## 2024-11-27 RX ORDER — ONDANSETRON 2 MG/ML
INJECTION INTRAMUSCULAR; INTRAVENOUS AS NEEDED
Status: DISCONTINUED | OUTPATIENT
Start: 2024-11-27 | End: 2024-11-27 | Stop reason: SURG

## 2024-11-27 RX ORDER — HYDROMORPHONE HYDROCHLORIDE 1 MG/ML
0.25 INJECTION, SOLUTION INTRAMUSCULAR; INTRAVENOUS; SUBCUTANEOUS
Status: DISCONTINUED | OUTPATIENT
Start: 2024-11-27 | End: 2024-11-27 | Stop reason: HOSPADM

## 2024-11-27 RX ORDER — DEXAMETHASONE SODIUM PHOSPHATE 4 MG/ML
INJECTION, SOLUTION INTRA-ARTICULAR; INTRALESIONAL; INTRAMUSCULAR; INTRAVENOUS; SOFT TISSUE AS NEEDED
Status: DISCONTINUED | OUTPATIENT
Start: 2024-11-27 | End: 2024-11-27 | Stop reason: SURG

## 2024-11-27 RX ORDER — ACETAMINOPHEN 325 MG/1
650 TABLET ORAL EVERY 4 HOURS PRN
Status: DISCONTINUED | OUTPATIENT
Start: 2024-11-27 | End: 2024-11-30 | Stop reason: HOSPADM

## 2024-11-27 RX ORDER — PROMETHAZINE HYDROCHLORIDE 12.5 MG/1
25 TABLET ORAL ONCE AS NEEDED
Status: DISCONTINUED | OUTPATIENT
Start: 2024-11-27 | End: 2024-11-27 | Stop reason: HOSPADM

## 2024-11-27 RX ORDER — PROPOFOL 10 MG/ML
VIAL (ML) INTRAVENOUS AS NEEDED
Status: DISCONTINUED | OUTPATIENT
Start: 2024-11-27 | End: 2024-11-27 | Stop reason: SURG

## 2024-11-27 RX ORDER — PROMETHAZINE HYDROCHLORIDE 25 MG/1
25 SUPPOSITORY RECTAL ONCE AS NEEDED
Status: DISCONTINUED | OUTPATIENT
Start: 2024-11-27 | End: 2024-11-27 | Stop reason: HOSPADM

## 2024-11-27 RX ORDER — ROCURONIUM BROMIDE 10 MG/ML
INJECTION, SOLUTION INTRAVENOUS AS NEEDED
Status: DISCONTINUED | OUTPATIENT
Start: 2024-11-27 | End: 2024-11-27 | Stop reason: SURG

## 2024-11-27 RX ORDER — ASPIRIN 81 MG/1
81 TABLET, CHEWABLE ORAL DAILY
Status: DISCONTINUED | OUTPATIENT
Start: 2024-11-27 | End: 2024-11-30 | Stop reason: HOSPADM

## 2024-11-27 RX ORDER — HYDROCODONE BITARTRATE AND ACETAMINOPHEN 5; 325 MG/1; MG/1
1 TABLET ORAL EVERY 4 HOURS PRN
Status: DISCONTINUED | OUTPATIENT
Start: 2024-11-27 | End: 2024-11-27

## 2024-11-27 RX ORDER — MEPERIDINE HYDROCHLORIDE 25 MG/ML
12.5 INJECTION INTRAMUSCULAR; INTRAVENOUS; SUBCUTANEOUS
Status: DISCONTINUED | OUTPATIENT
Start: 2024-11-27 | End: 2024-11-27 | Stop reason: HOSPADM

## 2024-11-27 RX ORDER — ACETAMINOPHEN 500 MG
1000 TABLET ORAL ONCE
Status: COMPLETED | OUTPATIENT
Start: 2024-11-27 | End: 2024-11-27

## 2024-11-27 RX ORDER — NALOXONE HCL 0.4 MG/ML
0.4 VIAL (ML) INJECTION
Status: DISCONTINUED | OUTPATIENT
Start: 2024-11-27 | End: 2024-11-30 | Stop reason: HOSPADM

## 2024-11-27 RX ORDER — MAGNESIUM HYDROXIDE 1200 MG/15ML
LIQUID ORAL AS NEEDED
Status: DISCONTINUED | OUTPATIENT
Start: 2024-11-27 | End: 2024-11-27 | Stop reason: HOSPADM

## 2024-11-27 RX ORDER — CEFAZOLIN SODIUM 1 G/3ML
INJECTION, POWDER, FOR SOLUTION INTRAMUSCULAR; INTRAVENOUS AS NEEDED
Status: DISCONTINUED | OUTPATIENT
Start: 2024-11-27 | End: 2024-11-27 | Stop reason: SURG

## 2024-11-27 RX ORDER — SODIUM CHLORIDE, SODIUM LACTATE, POTASSIUM CHLORIDE, CALCIUM CHLORIDE 600; 310; 30; 20 MG/100ML; MG/100ML; MG/100ML; MG/100ML
9 INJECTION, SOLUTION INTRAVENOUS CONTINUOUS PRN
Status: DISCONTINUED | OUTPATIENT
Start: 2024-11-27 | End: 2024-11-27

## 2024-11-27 RX ORDER — PHENYLEPHRINE HCL IN 0.9% NACL 1 MG/10 ML
SYRINGE (ML) INTRAVENOUS AS NEEDED
Status: DISCONTINUED | OUTPATIENT
Start: 2024-11-27 | End: 2024-11-27 | Stop reason: SURG

## 2024-11-27 RX ORDER — OXYCODONE HYDROCHLORIDE 5 MG/1
5 TABLET ORAL
Status: DISCONTINUED | OUTPATIENT
Start: 2024-11-27 | End: 2024-11-27 | Stop reason: HOSPADM

## 2024-11-27 RX ORDER — MIDAZOLAM HYDROCHLORIDE 2 MG/2ML
2 INJECTION, SOLUTION INTRAMUSCULAR; INTRAVENOUS ONCE
Status: COMPLETED | OUTPATIENT
Start: 2024-11-27 | End: 2024-11-27

## 2024-11-27 RX ORDER — FENTANYL CITRATE 50 UG/ML
INJECTION, SOLUTION INTRAMUSCULAR; INTRAVENOUS AS NEEDED
Status: DISCONTINUED | OUTPATIENT
Start: 2024-11-27 | End: 2024-11-27 | Stop reason: SURG

## 2024-11-27 RX ORDER — SODIUM CHLORIDE 9 MG/ML
50 INJECTION, SOLUTION INTRAVENOUS CONTINUOUS
Status: ACTIVE | OUTPATIENT
Start: 2024-11-27 | End: 2024-11-27

## 2024-11-27 RX ORDER — MORPHINE SULFATE 2 MG/ML
2 INJECTION, SOLUTION INTRAMUSCULAR; INTRAVENOUS EVERY 4 HOURS PRN
Status: DISCONTINUED | OUTPATIENT
Start: 2024-11-27 | End: 2024-11-27

## 2024-11-27 RX ORDER — HYDROMORPHONE HYDROCHLORIDE 1 MG/ML
0.5 INJECTION, SOLUTION INTRAMUSCULAR; INTRAVENOUS; SUBCUTANEOUS
Status: DISCONTINUED | OUTPATIENT
Start: 2024-11-27 | End: 2024-11-27 | Stop reason: HOSPADM

## 2024-11-27 RX ADMIN — DEXAMETHASONE SODIUM PHOSPHATE 4 MG: 4 INJECTION, SOLUTION INTRAMUSCULAR; INTRAVENOUS at 07:59

## 2024-11-27 RX ADMIN — DOXYCYCLINE 100 MG: 100 INJECTION, POWDER, LYOPHILIZED, FOR SOLUTION INTRAVENOUS at 04:30

## 2024-11-27 RX ADMIN — Medication 100 MCG: at 08:40

## 2024-11-27 RX ADMIN — FENTANYL CITRATE 50 MCG: 50 INJECTION, SOLUTION INTRAMUSCULAR; INTRAVENOUS at 07:53

## 2024-11-27 RX ADMIN — HYDROMORPHONE HYDROCHLORIDE 0.5 MG: 1 INJECTION, SOLUTION INTRAMUSCULAR; INTRAVENOUS; SUBCUTANEOUS at 08:25

## 2024-11-27 RX ADMIN — SODIUM CHLORIDE 50 ML/HR: 9 INJECTION, SOLUTION INTRAVENOUS at 10:32

## 2024-11-27 RX ADMIN — HYDROMORPHONE HYDROCHLORIDE 0.5 MG: 1 INJECTION, SOLUTION INTRAMUSCULAR; INTRAVENOUS; SUBCUTANEOUS at 09:25

## 2024-11-27 RX ADMIN — PIPERACILLIN AND TAZOBACTAM 3.38 G: 3; .375 INJECTION, POWDER, FOR SOLUTION INTRAVENOUS at 22:54

## 2024-11-27 RX ADMIN — FENTANYL CITRATE 50 MCG: 50 INJECTION, SOLUTION INTRAMUSCULAR; INTRAVENOUS at 07:46

## 2024-11-27 RX ADMIN — VALSARTAN 160 MG: 80 TABLET, FILM COATED ORAL at 10:32

## 2024-11-27 RX ADMIN — SODIUM CHLORIDE 40 ML: 9 INJECTION, SOLUTION INTRAVENOUS at 15:08

## 2024-11-27 RX ADMIN — CEFAZOLIN 2 G: 1 INJECTION, POWDER, FOR SOLUTION INTRAMUSCULAR; INTRAVENOUS at 08:01

## 2024-11-27 RX ADMIN — OXYCODONE 5 MG: 5 TABLET ORAL at 09:27

## 2024-11-27 RX ADMIN — MIDAZOLAM HYDROCHLORIDE 2 MG: 1 INJECTION, SOLUTION INTRAMUSCULAR; INTRAVENOUS at 07:39

## 2024-11-27 RX ADMIN — SODIUM CHLORIDE, POTASSIUM CHLORIDE, SODIUM LACTATE AND CALCIUM CHLORIDE: 600; 310; 30; 20 INJECTION, SOLUTION INTRAVENOUS at 07:41

## 2024-11-27 RX ADMIN — ONDANSETRON HYDROCHLORIDE 4 MG: 2 SOLUTION INTRAMUSCULAR; INTRAVENOUS at 08:32

## 2024-11-27 RX ADMIN — PROPOFOL 150 MG: 10 INJECTION, EMULSION INTRAVENOUS at 07:46

## 2024-11-27 RX ADMIN — PIPERACILLIN AND TAZOBACTAM 3.38 G: 3; .375 INJECTION, POWDER, FOR SOLUTION INTRAVENOUS at 07:51

## 2024-11-27 RX ADMIN — DOXYCYCLINE 100 MG: 100 INJECTION, POWDER, LYOPHILIZED, FOR SOLUTION INTRAVENOUS at 15:08

## 2024-11-27 RX ADMIN — SUGAMMADEX 200 MG: 100 INJECTION, SOLUTION INTRAVENOUS at 08:45

## 2024-11-27 RX ADMIN — DEXMEDETOMIDINE HYDROCHLORIDE 10 MCG: 100 INJECTION, SOLUTION, CONCENTRATE INTRAVENOUS at 07:57

## 2024-11-27 RX ADMIN — ROSUVASTATIN CALCIUM 10 MG: 5 TABLET, FILM COATED ORAL at 21:50

## 2024-11-27 RX ADMIN — ASPIRIN 81 MG: 81 TABLET, CHEWABLE ORAL at 10:32

## 2024-11-27 RX ADMIN — ACETAMINOPHEN 1000 MG: 500 TABLET ORAL at 07:39

## 2024-11-27 RX ADMIN — ROCURONIUM BROMIDE 50 MG: 10 INJECTION, SOLUTION INTRAVENOUS at 07:46

## 2024-11-27 RX ADMIN — LIDOCAINE HYDROCHLORIDE 80 MG: 20 INJECTION, SOLUTION INTRAVENOUS at 07:46

## 2024-11-27 RX ADMIN — PIPERACILLIN AND TAZOBACTAM 3.38 G: 3; .375 INJECTION, POWDER, FOR SOLUTION INTRAVENOUS at 16:52

## 2024-11-27 NOTE — ANESTHESIA POSTPROCEDURE EVALUATION
Patient: Luis Angel Jade    Procedure Summary       Date: 11/27/24 Room / Location: [facility] OR 01 / [facility] MAIN OR    Anesthesia Start: 0741 Anesthesia Stop: 0903    Procedure: INCISION AND DRAINAGE LOWER EXTREMITY (Left) Diagnosis:       Abscess      (Abscess [L02.91])    Surgeons: Elgin Finley MD Provider: Elio Soto MD    Anesthesia Type: general ASA Status: 3            Anesthesia Type: general    Vitals  Vitals Value Taken Time   /64 11/27/24 1002   Temp 36.4 °C (97.6 °F) 11/27/24 0945   Pulse 67 11/27/24 1005   Resp 16 11/27/24 1000   SpO2 93 % 11/27/24 1005   Vitals shown include unfiled device data.        Post Anesthesia Care and Evaluation    Patient location during evaluation: bedside  Patient participation: complete - patient participated  Level of consciousness: awake  Pain management: adequate    Airway patency: patent  PONV Status: none  Cardiovascular status: acceptable and stable  Respiratory status: acceptable  Hydration status: acceptable

## 2024-11-27 NOTE — ANESTHESIA PREPROCEDURE EVALUATION
Anesthesia Evaluation     Patient summary reviewed and Nursing notes reviewed   no history of anesthetic complications:   NPO Solid Status: > 8 hours  NPO Liquid Status: > 2 hours           Airway   Mallampati: III  TM distance: <3 FB  Neck ROM: full  No difficulty expected  Dental    (+) poor dentition        Pulmonary - normal exam    breath sounds clear to auscultation  (+) a smoker (1 ppd) Current,  Cardiovascular - normal exam  Exercise tolerance: good (4-7 METS)    ECG reviewed  Rhythm: regular  Rate: normal    (+) hypertension, PVD (s/p bifem a couple of years ago, now with possible infection), hyperlipidemia      Neuro/Psych- negative ROS  GI/Hepatic/Renal/Endo    (+) GERD (diet controlled, OTC meds) well controlled    Musculoskeletal (-) negative ROS    Abdominal    Substance History - negative use     OB/GYN negative ob/gyn ROS         Other - negative ROS       ROS/Med Hx Other: PAT Nursing Notes unavailable.    EKG- SR, early repol                      Anesthesia Plan    ASA 3     general     (Patient understands anesthesia not responsible for dental damage.)  intravenous induction     Anesthetic plan, risks, benefits, and alternatives have been provided, discussed and informed consent has been obtained with: patient.    Use of blood products discussed with patient .    Plan discussed with CRNA.        CODE STATUS:    Level Of Support Discussed With: Patient  Code Status (Patient has no pulse and is not breathing): CPR (Attempt to Resuscitate)  Medical Interventions (Patient has pulse or is breathing): Full Support

## 2024-11-27 NOTE — SIGNIFICANT NOTE
11/27/24 0700   Physical Therapy Time and Intention   Session Not Performed other (see comments)  (pending vascular sx)

## 2024-11-27 NOTE — OP NOTE
INCISION AND DRAINAGE WOUND  Procedure Report    Patient Name:  Luis Angel Jade  YOB: 1980    Date of Surgery:  11/27/2024     Indications: Left lower inguinal area abscess in the setting of previous aortobifem bypass 3 years ago and a 44-year-old who continues to smoke and thus puts himself at risk for limb loss and further complication.  Fortunately in this particular case the graft was not involved today.    Pre-op Diagnosis:   Abscess [L02.91]       Post-Op Diagnosis Codes:     * Abscess [L02.91]    Procedure(s):  INCISION AND DRAINAGE LEFT LOWER EXTREMITY  2.  Pulse lavage with 3 L of Ancef irrigation and washout using Irrisept  3.  Wound VAC placement with silver sponge to the area which measured approximately 6 cm in length x 2.5 cm in width    Staff:  Surgeon(s):  Elgin Finley MD    Assistant: Glory Ramires RN CSA    Anesthesia: General    Estimated Blood Loss: 10 mL    IV fluids for 100 mL:      Urine output: 125 mL    Implants:    Nothing was implanted during the procedure    Specimen: Obtained wound cultures and used a Lukey trap tube to obtain any abscess fluid for culture as well       Findings: The area to the lower left inguinal region was indurated and but had minimal purulent drainage remaining.  It seemed that he had a opened up the wound and it was draining since yesterday when he arrived to [facility].  Of note the area of infection was low enough and medial enough that it was away from the vessels and far away from his left femoral anastomosis of his aortobifem bypass graft.  As such the graft is not involved in the infectious process and thus no further intervention was necessary to protect it.    Complications: None    Description of Procedure: Patient was brought back to the operating room placed on the operating table in supine position.  Upon induction of general tracheal anesthesia the left leg was prepped and draped in normal sterile fashion from umbilicus  down to the knee.  Physician the timeout was performed and an incision was created overlying the abscess area which was at the inguinal fold low near the base of the penis and extended caudally for approximately 6 to 7 cm.  The area was opened further with electrocautery and cultures were obtained.  There was a tunnel which went medially towards the base of the penis and we did try to express any purulent material or fluid out that we could.  A Lukey trap tube was then  used to obtain any serosanguineous drainage for cultures as well.  At this point the area was noted to be indurated but there is no evidence of fluctuance and no further drainage or purulence noted even with attempting to compress the surrounding area.  As such the area was cleaned with pulse lavage of 3 L with Ancef irrigation.  This was followed by irrigation using Irrisept solution.   The area was cleaned and dried and made hemostatic using electrocautery after which time the superficial wound and tract tunneling medially were packed with silver spines and wound VAC was placed in standard fashion.  The VAC had an excellent seal at cases end and was placed at 125 mm Hg suction.  The plan will be to allow the wound to heal with the use of wound VAC and by secondary intention given the previous infection.  Patient tolerated the procedure well with no complications or difficulties and was taken to the postanesthesia care unit in stable condition.    Assistant: Glory Ramires RN CSA  was responsible for performing the following activities: Retraction, Suction, Irrigation, Closing, and Placing Dressing and their skilled assistance was necessary for the success of this case.    Elgin Finley MD     Date: 11/27/2024  Time: 09:07 EST

## 2024-11-27 NOTE — PROGRESS NOTES
[facility]   Hospitalist Progress Note  Date: 2024  Patient Name: Luis Angel Jade  : 1980  MRN: [id number]  Date of admission: 2024      Subjective   Subjective     Chief Complaint: Follow up for left groin wound    Summary: 44-year-old male with HTN, dyslipidemia, PVD with history of aortobifemoral bypass in , ongoing tobacco use who was transferred from outside hospital for management of left groin abscess.  Ascites CT scan reported 2.6 cm abscess.  On broad-spectrum antibiotics.  Vascular surgery consulted.  Underwent left groin I&D with wound VAC placement.  Microbiology pending.    Interval Followup:   Seen following surgery  No acute complaints  Denies left groin pain  Denies left leg pain, paresthesias  Wound VAC in place    Objective   Objective     Vitals:   Temp:  [97.4 °F (36.3 °C)-98.7 °F (37.1 °C)] 97.5 °F (36.4 °C)  Heart Rate:  [39-83] 62  Resp:  [15-18] 16  BP: ()/(62-92) 119/75  Flow (L/min) (Oxygen Therapy):  [6] 6  FiO2 (%):  [56 %-57 %] 56 %  Physical Exam    Constitutional: conversant, NAD   Respiratory:  nonlabored respirations    Cardiovascular:  RRR, no edema   Gastrointestinal: soft, nondistended   Neurologic: Alert, speech clear   Skin: Extremities warm, no mottling lower extremity    Result Review    Result Review:  I have personally reviewed the following over the last 24 hours (07:00 to 07:00) and agree with the following findings  [x]  Laboratory  CBC          2024    15:49 2024    05:26   CBC   WBC 9.50  9.80    RBC 4.86  4.85    Hemoglobin 14.8  14.7    Hematocrit 45.8  46.3    MCV 94.2  95.5    MCH 30.5  30.3    MCHC 32.3  31.7    RDW 13.6  13.5    Platelets 192  200      CMP          2024    15:49 2024    05:26   CMP   Glucose 103  109    BUN 11  14    Creatinine 0.82  0.86    EGFR 111.1  109.5    Sodium 139  140    Potassium 4.1  4.6    Chloride 105  107    Calcium 9.2  9.6    Total Protein 7.2  7.3    Albumin 4.0  4.0     Globulin 3.2  3.3    Total Bilirubin 0.5  0.3    Alkaline Phosphatase 77  75    AST (SGOT) 29  32    ALT (SGPT) 41  45    Albumin/Globulin Ratio 1.3  1.2    BUN/Creatinine Ratio 13.4  16.3    Anion Gap 10.1  10.9      [x]  Microbiology  Peripheral blood culture pending  Left groin wound culture pending  [x]  Radiology   []  EKG/Telemetry   []  Cardiology/Vascular   []  Pathology  []  Old records  [x]  Other:    Intake/Output Summary (Last 24 hours) at 11/27/2024 1136  Last data filed at 11/27/2024 1000  Gross per 24 hour   Intake 1500 ml   Output 60 ml   Net 1440 ml         Assessment & Plan   Assessment / Plan     Assessment/Plan:  Left groin abscess  PVD with history of aortobifemoral bypass in 2021  Tobacco abuse   Essential hypertension  Dyslipidemia      Status post I&D of left groin abscess with wound VAC placement.  Discussed with Dr Finley, he did not see any involvement of previous aorto-bifem bypass.  Will allow area to heal with secondary intention and will need to continue wound VAC on discharge  Continue neurovascular checks per protocol  Wound care RN consult for wound VAC, appreciate assistance    Remains afebrile, white count normal  Peripheral blood and intraoperative wound cultures pending  Continue Doxycycline and Zosyn, day 2     Continue aspirin 81 mg daily  Reinforce importance of tobacco cessation.  Not needing NRT at this time  BP controlled. Continue Valsartan 60 mg daily    Recheck lipid panel.  Continue Crestor 10 mg daily    Heart healthy diet  VTE ppx: Lovenox 30 mg daily  CBC, BMP in AM    Discussed with Dr Finley    VTE Prophylaxis:  Pharmacologic VTE prophylaxis orders are present.        CODE STATUS:   Level Of Support Discussed With: Patient  Code Status (Patient has no pulse and is not breathing): CPR (Attempt to Resuscitate)  Medical Interventions (Patient has pulse or is breathing): Full Support    Electronically signed by Rolando Brown DO, 11/27/24, 3:34 PM EST.

## 2024-11-27 NOTE — PLAN OF CARE
Goal Outcome Evaluation:  Plan of Care Reviewed With: patient, spouse        Progress: improving  Outcome Evaluation: Pt remained A&Ox4. Also, pt remained on room air maintaining stable oxygen saturation. Pt denied pain. Pt under went I&D with wound vac placement this shift. Pt tolerated well. Pts diet was advanced to cardiac, heart heathy diet. Pt tolerated well. All other vital signs remained stable.

## 2024-11-27 NOTE — SIGNIFICANT NOTE
Made aware NPWT placed and needing to be changed on Friday   Silver foam placed in OR today.   Attempted to educate patient and significant other on NPWT significant other is aware of NPWT due to working in healthcare.   Discussed home vac pending insurance approval and that patient will need home health or [facility] for nursing to change dressing.    made aware VAC in place.   Seal intact, foam compressed, seal check green, no drainage present to dressing, patient tolerating well.     [id number]

## 2024-11-27 NOTE — PLAN OF CARE
Goal Outcome Evaluation:              Outcome Evaluation: Patient remains alert and orientated x 4, patient wife is at bedside. Patient received IV antibiotics per order, patient had no complaints of any pain, patient has been NPO since midnight. Patient took a CHG shower to prep for surgery

## 2024-11-28 LAB
ANION GAP SERPL CALCULATED.3IONS-SCNC: 12.3 MMOL/L (ref 5–15)
APTT PPP: 28.1 SECONDS (ref 24.2–34.2)
BACTERIA SPEC AEROBE CULT: NORMAL
BASOPHILS # BLD AUTO: 0.05 10*3/MM3 (ref 0–0.2)
BASOPHILS NFR BLD AUTO: 0.4 % (ref 0–1.5)
BUN SERPL-MCNC: 13 MG/DL (ref 6–20)
BUN/CREAT SERPL: 16.5 (ref 7–25)
CALCIUM SPEC-SCNC: 8.6 MG/DL (ref 8.6–10.5)
CHLORIDE SERPL-SCNC: 108 MMOL/L (ref 98–107)
CHOLEST SERPL-MCNC: 137 MG/DL (ref 0–200)
CO2 SERPL-SCNC: 20.7 MMOL/L (ref 22–29)
CREAT SERPL-MCNC: 0.79 MG/DL (ref 0.76–1.27)
DEPRECATED RDW RBC AUTO: 47.6 FL (ref 37–54)
EGFRCR SERPLBLD CKD-EPI 2021: 112.3 ML/MIN/1.73
EOSINOPHIL # BLD AUTO: 0.15 10*3/MM3 (ref 0–0.4)
EOSINOPHIL NFR BLD AUTO: 1.1 % (ref 0.3–6.2)
ERYTHROCYTE [DISTWIDTH] IN BLOOD BY AUTOMATED COUNT: 13.4 % (ref 12.3–15.4)
GLUCOSE SERPL-MCNC: 154 MG/DL (ref 65–99)
GRAM STN SPEC: NORMAL
GRAM STN SPEC: NORMAL
HCT VFR BLD AUTO: 41.6 % (ref 37.5–51)
HDLC SERPL-MCNC: 31 MG/DL (ref 40–60)
HGB BLD-MCNC: 13.3 G/DL (ref 13–17.7)
IMM GRANULOCYTES # BLD AUTO: 0.04 10*3/MM3 (ref 0–0.05)
IMM GRANULOCYTES NFR BLD AUTO: 0.3 % (ref 0–0.5)
INR PPP: 0.93 (ref 0.86–1.15)
LDLC SERPL CALC-MCNC: 81 MG/DL (ref 0–100)
LDLC/HDLC SERPL: 2.52 {RATIO}
LYMPHOCYTES # BLD AUTO: 2.64 10*3/MM3 (ref 0.7–3.1)
LYMPHOCYTES NFR BLD AUTO: 19.1 % (ref 19.6–45.3)
MCH RBC QN AUTO: 30.5 PG (ref 26.6–33)
MCHC RBC AUTO-ENTMCNC: 32 G/DL (ref 31.5–35.7)
MCV RBC AUTO: 95.4 FL (ref 79–97)
MONOCYTES # BLD AUTO: 0.76 10*3/MM3 (ref 0.1–0.9)
MONOCYTES NFR BLD AUTO: 5.5 % (ref 5–12)
NEUTROPHILS NFR BLD AUTO: 10.18 10*3/MM3 (ref 1.7–7)
NEUTROPHILS NFR BLD AUTO: 73.6 % (ref 42.7–76)
NRBC BLD AUTO-RTO: 0 /100 WBC (ref 0–0.2)
PLATELET # BLD AUTO: 195 10*3/MM3 (ref 140–450)
PMV BLD AUTO: 11 FL (ref 6–12)
POTASSIUM SERPL-SCNC: 3.8 MMOL/L (ref 3.5–5.2)
PROTHROMBIN TIME: 12.6 SECONDS (ref 11.8–14.9)
RBC # BLD AUTO: 4.36 10*6/MM3 (ref 4.14–5.8)
SODIUM SERPL-SCNC: 141 MMOL/L (ref 136–145)
TRIGL SERPL-MCNC: 139 MG/DL (ref 0–150)
VLDLC SERPL-MCNC: 25 MG/DL (ref 5–40)
WBC NRBC COR # BLD AUTO: 13.82 10*3/MM3 (ref 3.4–10.8)

## 2024-11-28 PROCEDURE — 85610 PROTHROMBIN TIME: CPT | Performed by: SURGERY

## 2024-11-28 PROCEDURE — 85730 THROMBOPLASTIN TIME PARTIAL: CPT | Performed by: SURGERY

## 2024-11-28 PROCEDURE — 94799 UNLISTED PULMONARY SVC/PX: CPT

## 2024-11-28 PROCEDURE — 80048 BASIC METABOLIC PNL TOTAL CA: CPT | Performed by: SURGERY

## 2024-11-28 PROCEDURE — 99232 SBSQ HOSP IP/OBS MODERATE 35: CPT | Performed by: INTERNAL MEDICINE

## 2024-11-28 PROCEDURE — 85025 COMPLETE CBC W/AUTO DIFF WBC: CPT | Performed by: SURGERY

## 2024-11-28 PROCEDURE — 97161 PT EVAL LOW COMPLEX 20 MIN: CPT

## 2024-11-28 PROCEDURE — 94761 N-INVAS EAR/PLS OXIMETRY MLT: CPT

## 2024-11-28 PROCEDURE — 25010000002 PIPERACILLIN SOD-TAZOBACTAM PER 1 G: Performed by: SURGERY

## 2024-11-28 PROCEDURE — 80061 LIPID PANEL: CPT | Performed by: INTERNAL MEDICINE

## 2024-11-28 RX ORDER — DOXYCYCLINE 100 MG/1
100 CAPSULE ORAL EVERY 12 HOURS SCHEDULED
Status: DISCONTINUED | OUTPATIENT
Start: 2024-11-28 | End: 2024-11-30 | Stop reason: HOSPADM

## 2024-11-28 RX ORDER — HYDROCODONE BITARTRATE AND ACETAMINOPHEN 5; 325 MG/1; MG/1
1 TABLET ORAL EVERY 6 HOURS PRN
Status: DISCONTINUED | OUTPATIENT
Start: 2024-11-28 | End: 2024-11-30 | Stop reason: HOSPADM

## 2024-11-28 RX ADMIN — ROSUVASTATIN CALCIUM 10 MG: 5 TABLET, FILM COATED ORAL at 22:03

## 2024-11-28 RX ADMIN — PIPERACILLIN AND TAZOBACTAM 3.38 G: 3; .375 INJECTION, POWDER, FOR SOLUTION INTRAVENOUS at 15:17

## 2024-11-28 RX ADMIN — HYDROCODONE BITARTRATE AND ACETAMINOPHEN 1 TABLET: 5; 325 TABLET ORAL at 06:36

## 2024-11-28 RX ADMIN — PIPERACILLIN AND TAZOBACTAM 3.38 G: 3; .375 INJECTION, POWDER, FOR SOLUTION INTRAVENOUS at 06:36

## 2024-11-28 RX ADMIN — ASPIRIN 81 MG: 81 TABLET, CHEWABLE ORAL at 08:40

## 2024-11-28 RX ADMIN — PIPERACILLIN AND TAZOBACTAM 3.38 G: 3; .375 INJECTION, POWDER, FOR SOLUTION INTRAVENOUS at 22:52

## 2024-11-28 RX ADMIN — VALSARTAN 160 MG: 80 TABLET, FILM COATED ORAL at 08:40

## 2024-11-28 RX ADMIN — DOXYCYCLINE 100 MG: 100 INJECTION, POWDER, LYOPHILIZED, FOR SOLUTION INTRAVENOUS at 04:03

## 2024-11-28 RX ADMIN — ACETAMINOPHEN 650 MG: 325 TABLET ORAL at 05:40

## 2024-11-28 RX ADMIN — Medication 10 ML: at 08:42

## 2024-11-28 RX ADMIN — DOXYCYCLINE 100 MG: 100 CAPSULE ORAL at 17:30

## 2024-11-28 NOTE — PROGRESS NOTES
[facility]   Hospitalist Progress Note  Date: 2024  Patient Name: Luis Angel Jade  : 1980  MRN: [id number]  Date of admission: 2024      Subjective   Subjective     Chief Complaint: Follow up for left groin wound    Summary: 44-year-old male with HTN, dyslipidemia, PVD with history of aortobifemoral bypass in , ongoing tobacco use who was transferred from outside hospital for management of left groin abscess.  Ascites CT scan reported 2.6 cm abscess.  On broad-spectrum antibiotics.  Vascular surgery consulted.  Underwent left groin I&D with wound VAC placement.  Microbiology NGTD    Interval Followup:   Afebrile, blood pressure controlled  Denies fever or chills  Wound VAC in place.  Not complaining of any groin or lower extremity pain    Objective   Objective     Vitals:   Temp:  [97.3 °F (36.3 °C)-98.2 °F (36.8 °C)] 97.9 °F (36.6 °C)  Heart Rate:  [39-84] 66  Resp:  [15-20] 20  BP: ()/(59-89) 118/70  Flow (L/min) (Oxygen Therapy):  [6] 6  FiO2 (%):  [56 %-57 %] 56 %  Physical Exam    Constitutional: conversant, NAD   Respiratory:  nonlabored respirations    Cardiovascular:  RRR, no edema   Gastrointestinal: soft, nondistended   Neurologic: Alert, speech clear   Skin: Extremities warm, no mottling lower extremities    Result Review    Result Review:  I have personally reviewed the following over the last 24 hours (07:00 to 07:00) and agree with the following findings  [x]  Laboratory  CBC          2024    15:49 2024    05:26 2024    05:16   CBC   WBC 9.50  9.80  13.82    RBC 4.86  4.85  4.36    Hemoglobin 14.8  14.7  13.3    Hematocrit 45.8  46.3  41.6    MCV 94.2  95.5  95.4    MCH 30.5  30.3  30.5    MCHC 32.3  31.7  32.0    RDW 13.6  13.5  13.4    Platelets 192  200  195      CMP          2024    15:49 2024    05:26 2024    05:16   CMP   Glucose 103  109  154    BUN 11  14  13    Creatinine 0.82  0.86  0.79    EGFR 111.1  109.5  112.3     Sodium 139  140  141    Potassium 4.1  4.6  3.8    Chloride 105  107  108    Calcium 9.2  9.6  8.6    Total Protein 7.2  7.3     Albumin 4.0  4.0     Globulin 3.2  3.3     Total Bilirubin 0.5  0.3     Alkaline Phosphatase 77  75     AST (SGOT) 29  32     ALT (SGPT) 41  45     Albumin/Globulin Ratio 1.3  1.2     BUN/Creatinine Ratio 13.4  16.3  16.5    Anion Gap 10.1  10.9  12.3      [x]  Microbiology  Peripheral blood culture pending  Left groin wound culture NGTD  [x]  Radiology   []  EKG/Telemetry   []  Cardiology/Vascular   []  Pathology  []  Old records  [x]  Other:    Intake/Output Summary (Last 24 hours) at 11/28/2024 0716  Last data filed at 11/27/2024 1800  Gross per 24 hour   Intake 1300 ml   Output 60 ml   Net 1240 ml         Assessment & Plan   Assessment / Plan     Assessment/Plan:  Left groin abscess  PVD with history of aortobifemoral bypass in 2021  Tobacco abuse   Essential hypertension  Dyslipidemia      Status post I&D of left groin abscess with wound VAC placement.  Discussed with Dr Finley, he did not see any involvement of previous aorto-bifem bypass.  Will allow area to heal with secondary intention and will need to continue wound VAC on discharge  Continue neurovascular checks per protocol    Wound care nurse following, appreciate assistance  Working on getting wound vac approved for home. Patient prefers to follow-up at [facility] for dressing changes.    Remains afebrile. White count 13.8.  May be reactive  Peripheral blood and intraoperative wound cultures NGTD  Continue Doxycycline and Zosyn, day 2   Repeat CBC to monitor white count    Continue aspirin 81 mg daily  Reinforce importance of tobacco cessation.  Not requesting NRT  BP controlled. Continue Valsartan 60 mg daily    Total cholesterol 137, HDL 31, LDL 81, triglycerides 139.  Continue Crestor 10 mg daily    Heart healthy diet  VTE ppx: Lovenox 30 mg daily        VTE Prophylaxis:  Pharmacologic VTE prophylaxis orders are  present.        CODE STATUS:   Level Of Support Discussed With: Patient  Code Status (Patient has no pulse and is not breathing): CPR (Attempt to Resuscitate)  Medical Interventions (Patient has pulse or is breathing): Full Support    Electronically signed by Rolando Brown DO, 11/28/24, 1:39 PM EST.

## 2024-11-28 NOTE — PLAN OF CARE
Goal Outcome Evaluation:  Plan of Care Reviewed With: patient        Progress: no change  Outcome Evaluation: Patient does not present with any significant decline in functional mobility requiring inpatient PT services. He is currently completing all transfers and ambulating independently and is safe to continue doing so until his discharge from the hospital. He is safe to return home once medically able and in agreement no PT services are needed at this time.    Anticipated Discharge Disposition (PT): home

## 2024-11-28 NOTE — PROGRESS NOTES
" [facility]     Progress Note    Patient Name: Luis Angel Jade  : 1980  MRN: [id number]  Primary Care Physician:  Kvng De Luna MD  Date of admission: 2024    Subjective   Subjective     Pt seen and doing fine this AM. Ambulating in halls.    Objective   Objective     Vitals:   Temp:  [97.3 °F (36.3 °C)-98.2 °F (36.8 °C)] 97.9 °F (36.6 °C)  Heart Rate:  [53-83] 79  Resp:  [16-20] 20  BP: (107-190)/(69-87) 132/86    Physical Exam   Constitutional: Awake, alert   Neck: Supple   Respiratory: Clear to auscultation bilaterally, nonlabored respirations    Cardiovascular: RRR, no murmurs   Abdomen: benign   Extremities: symmetric with L inguinal wound vac in place and functional           Assessment & Plan   Assessment / Plan     Assessment/Plan:    Patient is a 44-year-old  who underwent aortobifemoral bypass by Dr. Gray  for significant iliac disease with thrombus at the time and has continued smoking since his procedure now PD # 1 S/p L inguinal I&D with washout and wound vac of left groin abscess.  Doing well.  2.  Discussed with patient and the necessity of complete smoking cessation as he is a young patient to his had the gold standard operation of an aortobifem already and this must essentially lasting for the duration of his life.  Also discussed with the patient that if this graft was to become infected he would require extensive surgery for removal of the graft with possible extra anatomical revascularization, prolonged hospital stays with morbidity and high risk of limb loss and death.    3.  He has a history of staph abscess after appendectomy few years ago and small bowel obstruction requiring CT-guided drainage.  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  4.  Past medical history significant for nicotine dependence, hypertension and dyslipidemia.  5.  Plan for home wound vac and " allow to heal by secondary intention.  Regular vac sponge is okay. Will continue to monitor the patient with supportive care.  Dr. Dumont will be covering service tomorrow.  Follow up with [facility] in 3-4 weeks for wound check and follow up on COMPLETE SMOKING CESSATION.    Active Hospital Problems:  Active Hospital Problems    Diagnosis    • **Abscess

## 2024-11-28 NOTE — PLAN OF CARE
Goal Outcome Evaluation:  Plan of Care Reviewed With: patient        Progress: no change  Outcome Evaluation: Pt remained A&Ox4 this shift. Also, pt remained on room air maintaining stable oxygen saturation. Pt denied pain this shift. Wound vac remained in place with no evidence of air leak or any issues. All other vital signs remained stable.

## 2024-11-28 NOTE — PLAN OF CARE
Goal Outcome Evaluation:  Plan of Care Reviewed With: patient        Progress: no change  Outcome Evaluation: Pt remained A&Ox4, on room air. Medicated per mar for c/o pain. IV antibiotics continued per orders. No needs at this time.

## 2024-11-28 NOTE — THERAPY EVALUATION
Acute Care - Physical Therapy Initial Evaluation   [facility]     Patient Name: Luis Angel Jade  : 1980  MRN: [id number]  Today's Date: 2024      Visit Dx:     ICD-10-CM ICD-9-CM   1. Abscess  L02.91 682.9   2. Difficulty walking  R26.2 719.7     Patient Active Problem List   Diagnosis    Peritoneal abscess    Peripheral vascular disease    Smoker    Hyperlipidemia    Cellulitis    Family history of coronary arteriosclerosis    Low back pain    Abscess     Past Medical History:   Diagnosis Date    HLD (hyperlipidemia)     Hypertension     S/P appendectomy 2021    Vascular disease      Past Surgical History:   Procedure Laterality Date    AORTA BIFEMORAL BYPASS      EXPLORATORY LAPAROTOMY N/A 2021    Procedure: EXPLORATORY LAPAROTOMY, LYSIS OF ADHESIONS,  APPENDECTOMY,;  Surgeon: Siddhartha Adams MD;  Location: [facility] MAIN OR;  Service: General;  Laterality: N/A;    HERNIA REPAIR      INCISION AND DRAINAGE OF WOUND Left 2024    Procedure: INCISION AND DRAINAGE LOWER EXTREMITY;  Surgeon: Elgin Finley MD;  Location: [facility] MAIN OR;  Service: Vascular;  Laterality: Left;     PT Assessment (Last 12 Hours)       PT Evaluation and Treatment       Row Name 24          Physical Therapy Time and Intention    Subjective Information no complaints  -AV     Document Type evaluation  -AV     Mode of Treatment individual therapy;physical therapy  -AV       Row Name 24          General Information    Patient Profile Reviewed yes  -AV     Patient Observations alert;cooperative;agree to therapy  -AV     Prior Level of Function independent:;all household mobility;gait;transfer;ADL's  Ambulated without an assistive device. No home O2.  -AV     Equipment Currently Used at Home none  -AV     Existing Precautions/Restrictions other (see comments)  wound vac left groin  -AV       Row Name 24          Living Environment    Current Living Arrangements home  -AV     Home  Accessibility stairs to enter home  -AV     People in Home spouse  -AV       Row Name 11/28/24 0900          Home Main Entrance    Number of Stairs, Main Entrance four  -AV     Stair Railings, Main Entrance none  -AV       Row Name 11/28/24 0900          Cognition    Orientation Status (Cognition) oriented x 3  -AV       Row Name 11/28/24 0900          Range of Motion (ROM)    Range of Motion bilateral lower extremities;ROM is WFL  -AV       Row Name 11/28/24 0900          Strength (Manual Muscle Testing)    Strength (Manual Muscle Testing) bilateral lower extremities;strength is WFL  -AV       Row Name 11/28/24 0900          Bed Mobility    Bed Mobility bed mobility (all) activities  -AV     All Activities, Hunt (Bed Mobility) independent  -AV       Row Name 11/28/24 0900          Transfers    Transfers sit-stand transfer;stand-sit transfer  -AV       Row Name 11/28/24 0900          Sit-Stand Transfer    Sit-Stand Hunt (Transfers) independent  -AV     Assistive Device (Sit-Stand Transfers) --  No AD  -AV       Row Name 11/28/24 0900          Stand-Sit Transfer    Stand-Sit Hunt (Transfers) independent  -AV     Assistive Device (Stand-Sit Transfers) --  No AD  -AV       Row Name 11/28/24 0900          Gait/Stairs (Locomotion)    Gait/Stairs Locomotion gait/ambulation independence;gait/ambulation assistive device;distance ambulated  -AV     Hunt Level (Gait) independent  -AV     Assistive Device (Gait) --  No AD  -AV     Distance in Feet (Gait) 450  -AV     Pattern (Gait) step-through  -AV       Row Name 11/28/24 0900          Balance    Balance Assessment standing dynamic balance  -AV     Dynamic Standing Balance independent  -AV     Position/Device Used, Standing Balance unsupported  -AV       Row Name             Wound 11/27/24 0805 Left anterior groin    Wound - Properties Group Placement Date: 11/27/24  -AM Placement Time: 0805  -AM Side: Left  -AM Orientation: anterior  -AM  Location: groin  -AM Primary Wound Type: Incision  -AM    Retired Wound - Properties Group Placement Date: 11/27/24 -AM Placement Time: 0805  -AM Side: Left  -AM Orientation: anterior  -AM Location: groin  -AM Primary Wound Type: Incision  -AM    Retired Wound - Properties Group Placement Date: 11/27/24 -AM Placement Time: 0805  -AM Side: Left  -AM Orientation: anterior  -AM Location: groin  -AM Primary Wound Type: Incision  -AM    Retired Wound - Properties Group Date first assessed: 11/27/24 -AM Time first assessed: 0805 -AM Side: Left  -AM Location: groin  -AM Primary Wound Type: Incision  -AM      Row Name 11/28/24 0900          Plan of Care Review    Plan of Care Reviewed With patient  -AV     Progress no change  -AV     Outcome Evaluation Patient does not present with any significant decline in functional mobility requiring inpatient PT services. He is currently completing all transfers and ambulating independently and is safe to continue doing so until his discharge from the hospital. He is safe to return home once medically able and in agreement no PT services are needed at this time.  -AV       Row Name 11/28/24 0900          Positioning and Restraints    Pre-Treatment Position in bed  -AV     Post Treatment Position chair  -AV     In Chair sitting;call light within reach;encouraged to call for assist  No alarms active upon therapist entry  -AV       Row Name 11/28/24 0900          Therapy Assessment/Plan (PT)    Criteria for Skilled Interventions Met (PT) no problems identified which require skilled intervention  -AV     Therapy Frequency (PT) evaluation only  -AV       Row Name 11/28/24 0900          PT Evaluation Complexity    History, PT Evaluation Complexity no personal factors and/or comorbidities  -AV     Examination of Body Systems (PT Eval Complexity) total of 4 or more elements  -AV     Clinical Presentation (PT Evaluation Complexity) stable  -AV     Clinical Decision Making (PT Evaluation  Complexity) low complexity  -AV     Overall Complexity (PT Evaluation Complexity) low complexity  -AV       Row Name 11/28/24 0900          Therapy Plan Review/Discharge Plan (PT)    Therapy Plan Review (PT) evaluation/treatment results reviewed;patient  -AV       Row Name 11/28/24 0900          Physical Therapy Goals    Problem Specific Goal Selection (PT) problem specific goal 1, PT  -AV       Row Name 11/28/24 0900          Problem Specific Goal 1 (PT)    Problem Specific Goal 1 (PT) Complete PT evaluation  -AV     Time Frame (Problem Specific Goal 1, PT) 1 day  -AV     Progress/Outcome (Problem Specific Goal 1, PT) goal met  -AV               User Key  (r) = Recorded By, (t) = Taken By, (c) = Cosigned By      Initials Name Provider Type    AM Sonia Rodriguez, RN Registered Nurse    Jimmy Valdez, PT Physical Therapist                    Physical Therapy Education       Title: PT OT SLP Therapies (In Progress)       Topic: Physical Therapy (In Progress)       Point: Mobility training (Done)       Learning Progress Summary            Patient Acceptance, E,TB, VU by  at 11/28/2024 0959                      Point: Home exercise program (Not Started)       Learner Progress:  Not documented in this visit.              Point: Body mechanics (Done)       Learning Progress Summary            Patient Acceptance, E,TB, VU by AV at 11/28/2024 0959                      Point: Precautions (Done)       Learning Progress Summary            Patient Acceptance, E,TB, VU by  at 11/28/2024 0959                                      User Key       Initials Effective Dates Name Provider Type Discipline     06/11/21 -  Jimmy Kingston, PT Physical Therapist PT                  PT Recommendation and Plan  Anticipated Discharge Disposition (PT): home  Therapy Frequency (PT): evaluation only  Plan of Care Reviewed With: patient  Progress: no change  Outcome Evaluation: Patient does not present with any significant decline in  functional mobility requiring inpatient PT services. He is currently completing all transfers and ambulating independently and is safe to continue doing so until his discharge from the hospital. He is safe to return home once medically able and in agreement no PT services are needed at this time.   Outcome Measures       Row Name 11/28/24 0900             How much help from another person do you currently need...    Turning from your back to your side while in flat bed without using bedrails? 4  -AV      Moving from lying on back to sitting on the side of a flat bed without bedrails? 4  -AV      Moving to and from a bed to a chair (including a wheelchair)? 4  -AV      Standing up from a chair using your arms (e.g., wheelchair, bedside chair)? 4  -AV      Climbing 3-5 steps with a railing? 4  -AV      To walk in hospital room? 4  -AV      AM-PAC 6 Clicks Score (PT) 24  -AV         Functional Assessment    Outcome Measure Options AM-PAC 6 Clicks Basic Mobility (PT)  -AV                User Key  (r) = Recorded By, (t) = Taken By, (c) = Cosigned By      Initials Name Provider Type    Jimmy Valdez, PT Physical Therapist                     Time Calculation:    PT Charges       Row Name 11/28/24 0954             Time Calculation    PT Received On 11/28/24  -AV         Untimed Charges    PT Eval/Re-eval Minutes 20  -AV         Total Minutes    Untimed Charges Total Minutes 20  -AV       Total Minutes 20  -AV                User Key  (r) = Recorded By, (t) = Taken By, (c) = Cosigned By      Initials Name Provider Type    Jimmy Valdez, DAVID Physical Therapist                  Therapy Charges for Today       Code Description Service Date Service Provider Modifiers Qty    68863469494 HC PT EVAL LOW COMPLEXITY 2 11/28/2024 Jimmy Kingston, PT GP 1            PT G-Codes  Outcome Measure Options: AM-PAC 6 Clicks Basic Mobility (PT)  AM-PAC 6 Clicks Score (PT): 24    Jimmy Kingston PT  11/28/2024

## 2024-11-29 ENCOUNTER — TRANSCRIBE ORDERS (OUTPATIENT)
Dept: ADMINISTRATIVE | Facility: HOSPITAL | Age: 44
End: 2024-11-29
Payer: COMMERCIAL

## 2024-11-29 DIAGNOSIS — L02.91 ABSCESS: Primary | ICD-10-CM

## 2024-11-29 LAB
DEPRECATED RDW RBC AUTO: 47.5 FL (ref 37–54)
ERYTHROCYTE [DISTWIDTH] IN BLOOD BY AUTOMATED COUNT: 13.6 % (ref 12.3–15.4)
HCT VFR BLD AUTO: 42.9 % (ref 37.5–51)
HGB BLD-MCNC: 13.5 G/DL (ref 13–17.7)
HOLD SPECIMEN: NORMAL
MCH RBC QN AUTO: 29.9 PG (ref 26.6–33)
MCHC RBC AUTO-ENTMCNC: 31.5 G/DL (ref 31.5–35.7)
MCV RBC AUTO: 94.9 FL (ref 79–97)
PLATELET # BLD AUTO: 208 10*3/MM3 (ref 140–450)
PMV BLD AUTO: 10.9 FL (ref 6–12)
RBC # BLD AUTO: 4.52 10*6/MM3 (ref 4.14–5.8)
WBC NRBC COR # BLD AUTO: 9.94 10*3/MM3 (ref 3.4–10.8)

## 2024-11-29 PROCEDURE — 99232 SBSQ HOSP IP/OBS MODERATE 35: CPT | Performed by: INTERNAL MEDICINE

## 2024-11-29 PROCEDURE — 85027 COMPLETE CBC AUTOMATED: CPT | Performed by: INTERNAL MEDICINE

## 2024-11-29 PROCEDURE — 25010000002 PIPERACILLIN SOD-TAZOBACTAM PER 1 G: Performed by: SURGERY

## 2024-11-29 PROCEDURE — 97605 NEG PRS WND THER DME<=50SQCM: CPT

## 2024-11-29 PROCEDURE — 25010000002 HEPARIN (PORCINE) PER 1000 UNITS: Performed by: INTERNAL MEDICINE

## 2024-11-29 RX ORDER — HEPARIN SODIUM 5000 [USP'U]/ML
5000 INJECTION, SOLUTION INTRAVENOUS; SUBCUTANEOUS EVERY 8 HOURS SCHEDULED
Status: DISCONTINUED | OUTPATIENT
Start: 2024-11-29 | End: 2024-11-30 | Stop reason: HOSPADM

## 2024-11-29 RX ORDER — AMOXICILLIN 875 MG/1
875 TABLET, COATED ORAL EVERY 12 HOURS SCHEDULED
Status: DISCONTINUED | OUTPATIENT
Start: 2024-11-29 | End: 2024-11-30 | Stop reason: HOSPADM

## 2024-11-29 RX ADMIN — PIPERACILLIN AND TAZOBACTAM 3.38 G: 3; .375 INJECTION, POWDER, FOR SOLUTION INTRAVENOUS at 14:00

## 2024-11-29 RX ADMIN — HYDROCODONE BITARTRATE AND ACETAMINOPHEN 1 TABLET: 5; 325 TABLET ORAL at 18:19

## 2024-11-29 RX ADMIN — ROSUVASTATIN CALCIUM 10 MG: 5 TABLET, FILM COATED ORAL at 21:11

## 2024-11-29 RX ADMIN — Medication 10 ML: at 08:31

## 2024-11-29 RX ADMIN — PIPERACILLIN AND TAZOBACTAM 3.38 G: 3; .375 INJECTION, POWDER, FOR SOLUTION INTRAVENOUS at 08:31

## 2024-11-29 RX ADMIN — VALSARTAN 160 MG: 80 TABLET, FILM COATED ORAL at 08:30

## 2024-11-29 RX ADMIN — Medication 10 ML: at 21:17

## 2024-11-29 RX ADMIN — HYDROCODONE BITARTRATE AND ACETAMINOPHEN 1 TABLET: 5; 325 TABLET ORAL at 08:30

## 2024-11-29 RX ADMIN — ASPIRIN 81 MG: 81 TABLET, CHEWABLE ORAL at 08:31

## 2024-11-29 RX ADMIN — ACETAMINOPHEN 650 MG: 325 TABLET ORAL at 13:44

## 2024-11-29 RX ADMIN — DOXYCYCLINE 100 MG: 100 CAPSULE ORAL at 08:31

## 2024-11-29 RX ADMIN — AMOXICILLIN 875 MG: 875 TABLET, FILM COATED ORAL at 21:11

## 2024-11-29 RX ADMIN — HEPARIN SODIUM 5000 UNITS: 5000 INJECTION INTRAVENOUS; SUBCUTANEOUS at 21:11

## 2024-11-29 RX ADMIN — HEPARIN SODIUM 5000 UNITS: 5000 INJECTION INTRAVENOUS; SUBCUTANEOUS at 13:44

## 2024-11-29 RX ADMIN — DOXYCYCLINE 100 MG: 100 CAPSULE ORAL at 21:11

## 2024-11-29 NOTE — SIGNIFICANT NOTE
signed VAC script submitted to vendor, continuing to wait on home VAC approval.   Called Vendor to inform of VAC script submission and need for approval today as is pending DC home today.   SW aware of [facility] appt need for Monday for VAC change (if able to DC today on home VAC unit)   Awaiting insurance approval for home VAC unit.

## 2024-11-29 NOTE — SIGNIFICANT NOTE
Notified by home VAC vendor that insurance information may be incorrect.   Discussed with patient who stated his insurance was through his wife, wife presented new insurance card, copied and returned to wife, faxed to vendor for verification.     Likely will not get response from vendor today regarding approval process for home VAC, but will give them until 7pm (their business hours) to respond. .  If no response will follow up in am with vendor.     Thank you for your patience.       Unit staff aware that registration is needed to update insurance information on file also.

## 2024-11-29 NOTE — SIGNIFICANT NOTE
Wound Eval / Progress Noted     [facility]     Patient Name: Luis Angel Jade  : 1980  MRN: [id number]  Today's Date: 2024                 Admit Date: 2024    Visit Dx:    ICD-10-CM ICD-9-CM   1. Abscess  L02.91 682.9   2. Difficulty walking  R26.2 719.7         Abscess        Past Medical History:   Diagnosis Date    HLD (hyperlipidemia)     Hypertension     S/P appendectomy 2021    Vascular disease         Past Surgical History:   Procedure Laterality Date    AORTA BIFEMORAL BYPASS      EXPLORATORY LAPAROTOMY N/A 2021    Procedure: EXPLORATORY LAPAROTOMY, LYSIS OF ADHESIONS,  APPENDECTOMY,;  Surgeon: Siddhartha Adams MD;  Location: [facility] MAIN OR;  Service: General;  Laterality: N/A;    HERNIA REPAIR      INCISION AND DRAINAGE OF WOUND Left 2024    Procedure: INCISION AND DRAINAGE LOWER EXTREMITY;  Surgeon: Elgin Finley MD;  Location: [facility] MAIN OR;  Service: Vascular;  Laterality: Left;         Physical Assessment:  Wound 24 0805 Left anterior groin (Active)   Wound Image    24 1300   Dressing Appearance dry;intact 24 1300   Closure None 24 1300   Base moist;red;pink;granulating 24 1300   Red (%), Wound Tissue Color 100 24 1300   Periwound dry;redness 24 1300   Periwound Temperature warm 24 1300   Periwound Skin Turgor soft 24 1300   Edges open 24 1300   Wound Length (cm) 5.7 cm 24 1300   Wound Width (cm) 0.8 cm 24 1300   Wound Depth (cm) 1 cm 24 1300   Wound Surface Area (cm^2) 4.56 cm^2 24 1300   Wound Volume (cm^3) 4.56 cm^3 24 1300   Undermining [Depth (cm)/Location] 1.3cm / 8 to 10 o'clock 24 1300   Drainage Characteristics/Odor serosanguineous 24 1300   Drainage Amount scant 24 1300   Care, Wound cleansed with;sterile normal saline;negative pressure wound therapy 24 1300   Dressing Care dressing removed;dressing applied;foam;transparent film 24 1300    Periwound Care absorptive dressing applied 11/29/24 1300       NPWT (Negative Pressure Wound Therapy) 11/27/24 0822 Left Groin (Active)   Therapy Setting continuous therapy 11/29/24 1300   Dressing foam, silver 11/29/24 1300   Pressure Setting 125 mmHg 11/29/24 1300   Sponges Inserted 1;other (see comments) 11/29/24 1300   Sponges Removed 1;other (see comments) 11/29/24 1300   Finger sweep complete Yes 11/29/24 1300        Wound Check / Follow-up:  Patient seen today for wound VAC dressing change. Patient is awake, alert, and oriented. Patient's wife is present at bedside. Patient was able to take a shower prior to dressing change following approval from Vascular Surgeon, per primary RN. Patient underwent an incision and drainage of the left lower extremity (Inguinal) with wound VAC placement upon completion of procedure on 11/27/2024. Patient reports having an aortobifem bypass in 2021 with graft placement. Patient states he developed what they assumed was a boil to his left groin around a week and a half ago. Patient states due to the location being near his graft site, he decided to come to the hospital. Current plan is for patient to discharge with home wound VAC and to be seen in [facility] for dressing changes.    Left groin abscess, post incision and drainage, with closure by secondary intention with negative pressure wound therapy. Wound VAC is disconnected following shower and wound VAC is off upon entry into room. Patient was disconnected from wound VAC therapy for less than twenty minutes. Wound VAC dressing removed with adhesive remover spray.  Finger sweep performed to ensure all foams were removed. One piece of silver foam noted within wound base and 1 piece of silver foam utilized for landing pad. Wound base presents with moist pink and red granulating tissue. No tunneling noted. Undermining is noted from 8 to 10 o'clock. Periwound tissue is dry with localized areas of dry redness noted; however, a  majority of tissue is normal pigmentation. Cleansed and irrigated wound base with normal saline and gauze, blotted dry.  Skin-Prep applied to periwound tissue and periwound tissue was draped with transparent film.  Wound base was lightly filled with 1 piece of silver foam and covered with transparent film.  A landing pad was made with 1 additional piece of silver foam and draped with transparent film.  Dressing was connected to wound VAC suction at 125 mmHg.  Seal obtained with no signs of lifting or leaking. Recommending to continue wound VAC therapy with Monday, Wednesday, and Friday dressing changes.    Impression: Left groin incision and drainage with closure by secondary intention, wound VAC dressing change.    Short term goals:  Regain skin integrity, skin protection, negative pressure wound therapy    Marilyn Diaz RN    11/29/2024    13:43 EST

## 2024-11-29 NOTE — PROGRESS NOTES
[facility]   Hospitalist Progress Note  Date: 2024  Patient Name: Luis Angel Jade  : 1980  MRN: [id number]  Date of admission: 2024      Subjective   Subjective     Chief Complaint: Follow up for left groin wound    Summary: 44-year-old male with HTN, dyslipidemia, PVD with history of aortobifemoral bypass in , ongoing tobacco use who was transferred from outside hospital for management of left groin abscess.  Ascites CT scan reported 2.6 cm abscess.  On broad-spectrum antibiotics.  Vascular surgery consulted.  Underwent left groin I&D with wound VAC placement.  Microbiology NGTD    Interval Followup:   No fever or chills  Blood pressure controlled  Reported a little more pain in the left groin with ambulation  No left lower extremity pain otherwise    Objective   Objective     Vitals:   Temp:  [97.3 °F (36.3 °C)-98.2 °F (36.8 °C)] 97.3 °F (36.3 °C)  Heart Rate:  [53-79] 68  Resp:  [18-20] 18  BP: (125-148)/(69-90) 127/89  Physical Exam    Constitutional: conversant, NAD   Respiratory:  nonlabored respirations    Cardiovascular:  RRR, no edema   Gastrointestinal: soft, nondistended   Neurologic: Alert, speech clear   Skin: Extremities warm, no mottling lower extremities    Result Review    Result Review:  I have personally reviewed the following over the last 24 hours (07:00 to 07:00) and agree with the following findings  [x]  Laboratory  CBC          2024    05:26 2024    05:16 2024    05:20   CBC   WBC 9.80  13.82  9.94    RBC 4.85  4.36  4.52    Hemoglobin 14.7  13.3  13.5    Hematocrit 46.3  41.6  42.9    MCV 95.5  95.4  94.9    MCH 30.3  30.5  29.9    MCHC 31.7  32.0  31.5    RDW 13.5  13.4  13.6    Platelets 200  195  208      CMP          2024    15:49 2024    05:26 2024    05:16   CMP   Glucose 103  109  154    BUN 11  14  13    Creatinine 0.82  0.86  0.79    EGFR 111.1  109.5  112.3    Sodium 139  140  141    Potassium 4.1  4.6  3.8     Chloride 105  107  108    Calcium 9.2  9.6  8.6    Total Protein 7.2  7.3     Albumin 4.0  4.0     Globulin 3.2  3.3     Total Bilirubin 0.5  0.3     Alkaline Phosphatase 77  75     AST (SGOT) 29  32     ALT (SGPT) 41  45     Albumin/Globulin Ratio 1.3  1.2     BUN/Creatinine Ratio 13.4  16.3  16.5    Anion Gap 10.1  10.9  12.3      [x]  Microbiology  Peripheral blood culture pending  Left groin wound culture NGTD  [x]  Radiology   []  EKG/Telemetry   []  Cardiology/Vascular   []  Pathology  []  Old records  [x]  Other:    Intake/Output Summary (Last 24 hours) at 11/29/2024 0735  Last data filed at 11/28/2024 1808  Gross per 24 hour   Intake 1320 ml   Output --   Net 1320 ml         Assessment & Plan   Assessment / Plan     Assessment/Plan:  Left groin abscess  PVD with history of aortobifemoral bypass in 2021  Tobacco abuse   Essential hypertension  Dyslipidemia      Status post I&D of left groin abscess with wound VAC placement.  Discussed with Dr Finley, he did not see any involvement of previous aorto-bifem bypass.  Will allow area to heal with secondary intention and will need to continue wound VAC on discharge  Continue neurovascular checks per protocol    Wound care nurse following, appreciate assistance  Working on getting wound vac approved for home. Patient prefers to follow-up at [facility] for dressing changes.    Remains afebrile.  White count normal  Peripheral blood and intraoperative wound cultures NGTD  Will see if there are any updated microbiology results from outside hospital to guide antibiotic therapy  Continue Doxycycline and Zosyn, day 2   Daily CBC to monitor white count    Continue aspirin 81 mg daily  Reinforce importance of tobacco cessation.  Not requesting NRT  BP controlled. Continue Valsartan 60 mg daily    Total cholesterol 137, HDL 31, LDL 81, triglycerides 139.  Continue Crestor 10 mg daily    Heart healthy diet  VTE ppx: Lovenox 30 mg daily    VTE Prophylaxis:  No VTE prophylaxis order  currently exists.        CODE STATUS:   Level Of Support Discussed With: Patient  Code Status (Patient has no pulse and is not breathing): CPR (Attempt to Resuscitate)  Medical Interventions (Patient has pulse or is breathing): Full Support    Electronically signed by Rolando Brown DO, 11/29/24, 10:55 AM EST.

## 2024-11-29 NOTE — PROGRESS NOTES
[facility]     Progress Note    Patient Name: Luis Angel Jade  : 1980  MRN: [id number]  Primary Care Physician:  Kvng De Luna MD  Date of admission: 2024    Subjective   Subjective     Pt seen and doing fine this AM. Ambulating in halls.    2024  Patient seen and examined.  No acute overnight events.  Postop day 2 status post I&D left groin abscess.  Leukocytosis resolved.  No fevers or chills reported.  No groin or thigh pain.  Abscess airway from left iliac limb of aortobifem bypass.    Objective   Objective     Vitals:   Temp:  [97.3 °F (36.3 °C)-98.2 °F (36.8 °C)] 98.1 °F (36.7 °C)  Heart Rate:  [61-79] 61  Resp:  [16-20] 16  BP: (127-148)/(74-94) 127/74    Physical Exam   Constitutional: Awake, alert   Neck: Supple   Respiratory: Clear to auscultation bilaterally, nonlabored respirations    Cardiovascular: RRR, no murmurs   Abdomen: benign   Extremities: symmetric with L inguinal wound vac in place and functional           Assessment & Plan   Assessment / Plan     Assessment/Plan:    Patient is a 44-year-old  who underwent aortobifemoral bypass by Dr. Gray  for significant iliac disease with thrombus at the time and has continued smoking since his procedure now PD # 2 S/p L inguinal I&D with washout and wound vac of left groin abscess.  Doing well.  2.  Discussed with patient and the necessity of complete smoking cessation as he is a young patient to his had the gold standard operation of an aortobifem already and this must essentially lasting for the duration of his life.  Also discussed with the patient that if this graft was to become infected he would require extensive surgery for removal of the graft with possible extra anatomical revascularization, prolonged hospital stays with morbidity and high risk of limb loss and death.    3.  He has a history of staph abscess after appendectomy few years ago and small bowel obstruction requiring CT-guided drainage.  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  4.  Past medical history significant for nicotine dependence, hypertension and dyslipidemia.  5.  Plan for home wound vac and allow to heal by secondary intention.  Regular vac sponge is okay. Will continue to monitor the patient with supportive care.  Dr. Dumont will be covering service tomorrow.  Follow up with [facility] in 3-4 weeks for wound check and follow up on COMPLETE SMOKING CESSATION.    Plan:  Satisfactory progress following left inguinal abscess I&D, washout and wound VAC placement.  Continue Zosyn and doxycycline.  Follow-up wound cultures.  Wound culture negative x 2 days with no growth.  Wound VAC change Tuesday Thursday and Saturday.  Out of bed to chair.  PT OT.  Continue medical management.  Vascular surgery will follow.  Case management working on home VAC.  Follow-up in the clinic as outlined above once discharged.      Active Hospital Problems:  Active Hospital Problems    Diagnosis    • **Abscess          " Ambulatory

## 2024-11-29 NOTE — PLAN OF CARE
Goal Outcome Evaluation:  Plan of Care Reviewed With: patient, family        Progress: improving  Outcome Evaluation: Alert and oriented x4. x2 complaints of pain, medicated per mar. Wound vac changed by wound care nurse. Wound dressing clean, dry, and intact. Ambulating frequently around hallway. IV antibiotic given per mar.

## 2024-11-29 NOTE — PLAN OF CARE
Goal Outcome Evaluation:  Plan of Care Reviewed With: patient        Progress: no change  Outcome Evaluation: A & O x 4, denies pain, appears to be sleeping without discomfort. Wound vac in place, tolerating well.

## 2024-11-29 NOTE — SIGNIFICANT NOTE
still working with vendor - no approval for home vac at this time.. continuing to call vendor for followups.

## 2024-11-30 ENCOUNTER — PREP FOR SURGERY (OUTPATIENT)
Dept: OTHER | Facility: HOSPITAL | Age: 44
End: 2024-11-30
Payer: COMMERCIAL

## 2024-11-30 ENCOUNTER — READMISSION MANAGEMENT (OUTPATIENT)
Dept: CALL CENTER | Facility: HOSPITAL | Age: 44
End: 2024-11-30
Payer: COMMERCIAL

## 2024-11-30 VITALS
OXYGEN SATURATION: 98 % | BODY MASS INDEX: 27.38 KG/M2 | DIASTOLIC BLOOD PRESSURE: 76 MMHG | SYSTOLIC BLOOD PRESSURE: 138 MMHG | HEIGHT: 71 IN | RESPIRATION RATE: 18 BRPM | TEMPERATURE: 97.9 F | HEART RATE: 60 BPM | WEIGHT: 195.55 LBS

## 2024-11-30 PROBLEM — Z46.89 ENCOUNTER FOR MANAGEMENT OF WOUND VAC: Status: ACTIVE | Noted: 2024-11-30

## 2024-11-30 LAB
BACTERIA SPEC AEROBE CULT: NORMAL
GRAM STN SPEC: NORMAL

## 2024-11-30 PROCEDURE — 25010000002 HEPARIN (PORCINE) PER 1000 UNITS: Performed by: INTERNAL MEDICINE

## 2024-11-30 PROCEDURE — 99239 HOSP IP/OBS DSCHRG MGMT >30: CPT | Performed by: INTERNAL MEDICINE

## 2024-11-30 RX ORDER — HYDROCODONE BITARTRATE AND ACETAMINOPHEN 5; 325 MG/1; MG/1
1 TABLET ORAL EVERY 6 HOURS PRN
Qty: 9 TABLET | Refills: 0 | Status: SHIPPED | OUTPATIENT
Start: 2024-11-30

## 2024-11-30 RX ORDER — NICOTINE 21 MG/24HR
1 PATCH, TRANSDERMAL 24 HOURS TRANSDERMAL EVERY 24 HOURS
Qty: 14 EACH | Refills: 0 | Status: SHIPPED | OUTPATIENT
Start: 2024-11-30

## 2024-11-30 RX ORDER — AMOXICILLIN 875 MG/1
875 TABLET, COATED ORAL EVERY 12 HOURS SCHEDULED
Qty: 10 TABLET | Refills: 0 | Status: SHIPPED | OUTPATIENT
Start: 2024-11-30 | End: 2024-12-05

## 2024-11-30 RX ORDER — DOXYCYCLINE 100 MG/1
100 CAPSULE ORAL EVERY 12 HOURS SCHEDULED
Qty: 10 CAPSULE | Refills: 0 | Status: SHIPPED | OUTPATIENT
Start: 2024-11-30 | End: 2024-12-05

## 2024-11-30 RX ADMIN — HYDROCODONE BITARTRATE AND ACETAMINOPHEN 1 TABLET: 5; 325 TABLET ORAL at 08:54

## 2024-11-30 RX ADMIN — ASPIRIN 81 MG: 81 TABLET, CHEWABLE ORAL at 08:48

## 2024-11-30 RX ADMIN — AMOXICILLIN 875 MG: 875 TABLET, FILM COATED ORAL at 08:48

## 2024-11-30 RX ADMIN — HYDROCODONE BITARTRATE AND ACETAMINOPHEN 1 TABLET: 5; 325 TABLET ORAL at 01:13

## 2024-11-30 RX ADMIN — Medication 10 ML: at 08:48

## 2024-11-30 RX ADMIN — DOXYCYCLINE 100 MG: 100 CAPSULE ORAL at 08:48

## 2024-11-30 RX ADMIN — HEPARIN SODIUM 5000 UNITS: 5000 INJECTION INTRAVENOUS; SUBCUTANEOUS at 05:32

## 2024-11-30 RX ADMIN — VALSARTAN 160 MG: 80 TABLET, FILM COATED ORAL at 08:48

## 2024-11-30 NOTE — PLAN OF CARE
Goal Outcome Evaluation:  Plan of Care Reviewed With: patient, spouse           Outcome Evaluation: Pt a&ox4. Up ad paul; intermittently ambulating around unit with spouse. Medicated x1 for c/o pain per MAR.

## 2024-11-30 NOTE — PLAN OF CARE
Goal Outcome Evaluation:  Plan of Care Reviewed With: patient, spouse        Progress: improving  Outcome Evaluation: Alert and oriented x4. x1 complaint of pain, medicated per mar. Home wound vac on, dressing clean, dry, and intact. Ambulating frequently. Discharge information provided.

## 2024-11-30 NOTE — PROGRESS NOTES
[facility]     Progress Note    Patient Name: Luis Angel Jade  : 1980  MRN: [id number]  Primary Care Physician:  Kvng De Luna MD  Date of admission: 2024    Subjective   Subjective     Pt seen and doing fine this AM. Ambulating in halls.    2024  Patient seen and examined.  No acute overnight events.  Postop day 2 status post I&D left groin abscess.  Leukocytosis resolved.  No fevers or chills reported.  No groin or thigh pain.  Abscess airway from left iliac limb of aortobifem bypass.    2024  Patient was discharged home prior to being seen today.  No acute overnight events reported    Objective   Objective     Vitals:   Temp:  [97.3 °F (36.3 °C)-98.1 °F (36.7 °C)] 97.9 °F (36.6 °C)  Heart Rate:  [52-78] 60  Resp:  [16-18] 18  BP: (119-156)/(71-95) 138/76    Physical Exam   Constitutional: Awake, alert   Neck: Supple   Respiratory: Clear to auscultation bilaterally, nonlabored respirations    Cardiovascular: RRR, no murmurs   Abdomen: benign   Extremities: symmetric with L inguinal wound vac in place and functional           Assessment & Plan   Assessment / Plan     Assessment/Plan:    Patient is a 44-year-old  who underwent aortobifemoral bypass by Dr. Gray  for significant iliac disease with thrombus at the time and has continued smoking since his procedure now PD # 2 S/p L inguinal I&D with washout and wound vac of left groin abscess.  Doing well.  2.  Discussed with patient and the necessity of complete smoking cessation as he is a young patient to his had the gold standard operation of an aortobifem already and this must essentially lasting for the duration of his life.  Also discussed with the patient that if this graft was to become infected he would require extensive surgery for removal of the graft with possible extra anatomical revascularization, prolonged hospital stays with morbidity and high risk of limb loss and death.    3.  He has a history of  "staph abscess after appendectomy few years ago and small bowel obstruction requiring CT-guided drainage.  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  4.  Past medical history significant for nicotine dependence, hypertension and dyslipidemia.  5.  Plan for home wound vac and allow to heal by secondary intention.  Regular vac sponge is okay. Will continue to monitor the patient with supportive care.  Dr. Dumont will be covering service tomorrow.  Follow up with [facility] in 3-4 weeks for wound check and follow up on COMPLETE SMOKING CESSATION.    Plan:  Satisfactory progress following left inguinal abscess I&D, washout and wound VAC placement.  Wound care clinic appointment scheduled.  Continue oral antibiotics.  Follow-up in the clinic as outlined above once discharged.      Active Hospital Problems:  Active Hospital Problems    Diagnosis    • **Abscess          "

## 2024-11-30 NOTE — OUTREACH NOTE
Prep Survey      Flowsheet Row Responses   [facility] patient discharged from? [facility]   Is LACE score < 7 ? No   Eligibility Readm Mgmt   Discharge diagnosis Abscess- I and D lower extremity   Does the patient have one of the following disease processes/diagnoses(primary or secondary)? General Surgery   Does the patient have Home health ordered? No   Is there a DME ordered? Yes   What DME was ordered? [employer] for wound vac, request pending at discharge.   Prep survey completed? Yes            EFRAIN PATEL - Registered Nurse

## 2024-11-30 NOTE — DISCHARGE SUMMARY
[facility]         HOSPITALIST  DISCHARGE SUMMARY    Patient Name: Luis Angel Jade  : 1980  MRN: [id number]    Date of Admission: 2024  Date of Discharge:  24  Primary Care Physician: Kvng De Luna MD    Consults       Date and Time Order Name Status Description    2024  2:48 PM Inpatient Vascular Surgery Consult Completed             Active and Resolved Hospital Problems:  Left groin abscess  PVD with history of aortobifemoral bypass in   Tobacco abuse   Essential hypertension  Dyslipidemia    Hospital Course     Hospital Course:  Luis Angel Jade is a 44 y.o. male with HTN, dyslipidemia, PVD with history of aortobifemoral bypass in , ongoing tobacco use who was transferred from outside hospital for management of left groin abscess measuring 2.6 cm.  Seen by vascular surgery. Underwent left groin I&D with wound VAC placement. There was no involvement of previous aorto-bifem bypass. Microbiology including peripheral blood and intraoperative wound cultures returned no growth.  Transferring hospital did not obtain blood culture.  He remained afebrile with normal white count.  Was set up with home wound VAC and will follow-up at outpatient nursing services for wound care.  Discharged on oral antibiotics to complete 10-day course.  Continued on antiplatelet therapy with aspirin.  Reinforced tobacco cessation.  Will have close follow-up with vascular surgery.  Discharged home in stable condition    Day of Discharge     Vital Signs:  Temp:  [97.3 °F (36.3 °C)-98.1 °F (36.7 °C)] 97.9 °F (36.6 °C)  Heart Rate:  [52-78] 60  Resp:  [16-18] 18  BP: (119-156)/(71-95) 138/76  Physical Exam:   GENERAL: conversant and nontoxic.  HEART: RRR. No edema  LUNGS: nonlabored  ABDOMEN: Soft, nondistended  SKIN: Left groin wound VAC  NEUROLOGIC: Alert, CN intact    Discharge Details        Discharge Medications        New Medications        Instructions Start Date   amoxicillin 875 MG  tablet  Commonly known as: AMOXIL   875 mg, Oral, Every 12 Hours Scheduled      doxycycline 100 MG capsule  Commonly known as: MONODOX   100 mg, Oral, Every 12 Hours Scheduled      HYDROcodone-acetaminophen 5-325 MG per tablet  Commonly known as: NORCO   1 tablet, Oral, Every 6 Hours PRN      nicotine 21 MG/24HR patch  Commonly known as: NICODERM CQ   1 patch, Transdermal, Every 24 Hours             Continue These Medications        Instructions Start Date   aspirin 81 MG EC tablet   81 mg, Nightly      rosuvastatin 10 MG tablet  Commonly known as: CRESTOR   10 mg, Nightly      valsartan 160 MG tablet  Commonly known as: DIOVAN   160 mg, Daily               Allergies   Allergen Reactions    Vancomycin Other (See Comments)     Redmans-itching with redness       Discharge Disposition:  Home or Self Care    Diet:  Hospital:  Diet Order   Procedures    Diet: Cardiac; Healthy Heart (2-3 Na+); Fluid Consistency: Thin (IDDSI 0)       Discharge Activity:       CODE STATUS:  Code Status and Medical Interventions: CPR (Attempt to Resuscitate); Full Support   Ordered at: 11/26/24 1509     Level Of Support Discussed With:    Patient     Code Status (Patient has no pulse and is not breathing):    CPR (Attempt to Resuscitate)     Medical Interventions (Patient has pulse or is breathing):    Full Support         Future Appointments   Date Time Provider Department Center   12/2/2024 10:00 AM WOUND OSTOMY/EVAL  [facility]       Additional Instructions for the Follow-ups that You Need to Schedule       Ambulatory Referral to Wound Ostomy   As directed      Discharge Follow-up with PCP   As directed       Currently Documented PCP:    Kvng De Luna MD    PCP Phone Number:    [phone]     Follow Up Details: 1 week        Discharge Follow-up with Specialty: ONS on Monday 12/2 at 8am   As directed      Specialty: ONS on Monday 12/2 at 8am        Discharge Follow-up with Specified Provider: Dr Mosquera; 3 Weeks   As directed       To: Dr Mosquera   Follow Up: 3 Weeks                Pertinent  and/or Most Recent Results     PROCEDURES:   INCISION AND DRAINAGE LEFT LOWER EXTREMITY  2.  Pulse lavage with 3 L of Ancef irrigation and washout using Irrisept  3.  Wound VAC placement with silver sponge to the area which measured approximately 6 cm in length x 2.5 cm in width    LAB RESULTS:      Lab 11/29/24  0520 11/28/24  0516 11/27/24  0526 11/26/24  1549   WBC 9.94 13.82* 9.80 9.50   HEMOGLOBIN 13.5 13.3 14.7 14.8   HEMATOCRIT 42.9 41.6 46.3 45.8   PLATELETS 208 195 200 192   NEUTROS ABS  --  10.18* 5.07 6.02   IMMATURE GRANS (ABS)  --  0.04 0.04 0.02   LYMPHS ABS  --  2.64 3.63* 2.59   MONOS ABS  --  0.76 0.52 0.47   EOS ABS  --  0.15 0.48* 0.35   MCV 94.9 95.4 95.5 94.2   PROTIME  --  12.6 13.0  --    APTT  --  28.1 31.8  --          Lab 11/28/24  0516 11/27/24  0526 11/26/24  1549   SODIUM 141 140 139   POTASSIUM 3.8 4.6 4.1   CHLORIDE 108* 107 105   CO2 20.7* 22.1 23.9   ANION GAP 12.3 10.9 10.1   BUN 13 14 11   CREATININE 0.79 0.86 0.82   EGFR 112.3 109.5 111.1   GLUCOSE 154* 109* 103*   CALCIUM 8.6 9.6 9.2   MAGNESIUM  --  2.3 2.2         Lab 11/27/24  0526 11/26/24  1549   TOTAL PROTEIN 7.3 7.2   ALBUMIN 4.0 4.0   GLOBULIN 3.3 3.2   ALT (SGPT) 45* 41   AST (SGOT) 32 29   BILIRUBIN 0.3 0.5   ALK PHOS 75 77         Lab 11/28/24  0516 11/27/24  0526   PROTIME 12.6 13.0   INR 0.93 0.97         Lab 11/28/24  0516   CHOLESTEROL 137   LDL CHOL 81   HDL CHOL 31*   TRIGLYCERIDES 139         Lab 11/27/24  0526   ABO TYPING A   RH TYPING Negative   ANTIBODY SCREEN Negative         Brief Urine Lab Results       None          Microbiology Results (last 10 days)       Procedure Component Value - Date/Time    Anaerobic Culture - Wound, Groin, left [[id number]]  (Normal) Collected: 11/27/24 0834    Lab Status: Preliminary result Specimen: Wound from Groin, left Updated: 11/30/24 0816     Anaerobic Culture No anaerobes isolated at 3 days    Wound Culture -  Wound, Groin, left [[id number]] Collected: 11/27/24 0834    Lab Status: Final result Specimen: Wound from Groin, left Updated: 11/30/24 0720     Wound Culture No growth at 3 days     Gram Stain Many (4+) WBCs seen      Few (2+) Gram positive cocci in pairs    Anaerobic Culture - Wound, Groin, left [[id number]]  (Normal) Collected: 11/27/24 0825    Lab Status: Preliminary result Specimen: Wound from Groin, left Updated: 11/30/24 0816     Anaerobic Culture No anaerobes isolated at 3 days    Wound Culture - Wound, Groin, left [[id number]] Collected: 11/27/24 0825    Lab Status: Final result Specimen: Wound from Groin, left Updated: 11/30/24 0719     Wound Culture No growth at 3 days     Gram Stain Rare (1+) WBCs seen      No organisms seen    Anaerobic Culture - Wound, Groin, left [[id number]]  (Normal) Collected: 11/27/24 0823    Lab Status: Preliminary result Specimen: Wound from Groin, left Updated: 11/30/24 0816     Anaerobic Culture No anaerobes isolated at 3 days    Wound Culture - Wound, Groin, left [[id number]] Collected: 11/27/24 0823    Lab Status: Final result Specimen: Wound from Groin, left Updated: 11/30/24 0719     Wound Culture No growth at 3 days     Gram Stain Rare (1+) WBCs seen      No organisms seen    Blood Culture - Blood, Arm, Left [[id number]]  (Normal) Collected: 11/26/24 1549    Lab Status: Preliminary result Specimen: Blood from Arm, Left Updated: 11/29/24 1615     Blood Culture No growth at 3 days    Blood Culture - Blood, Arm, Right [[id number]]  (Normal) Collected: 11/26/24 1549    Lab Status: Preliminary result Specimen: Blood from Arm, Right Updated: 11/29/24 1615     Blood Culture No growth at 3 days    Wound Culture - Wound, Leg [[id number]] Collected: 11/26/24 1527    Lab Status: Final result Specimen: Wound from Leg Updated: 11/28/24 0755     Wound Culture Scant growth (1+) Normal Skin Shanelle     Gram Stain Moderate (3+) WBCs seen      Few (2+) Gram positive cocci            No radiology  results for the last 7 days     Results for orders placed during the hospital encounter of 03/29/24    Doppler Arterial Multi Level Lower Extremity - Bilateral CAR    Interpretation Summary    Right Conclusion: The right MILADYS is normal. Normal digital pressures.    Left Conclusion: The left MILADYS is mildly reduced. Waveforms are consistent with popliteal disease. Normal digital pressures.  This is a slight decrease compared to prior exam.      Results for orders placed during the hospital encounter of 03/29/24    Doppler Arterial Multi Level Lower Extremity - Bilateral CAR    Interpretation Summary    Right Conclusion: The right MILADYS is normal. Normal digital pressures.    Left Conclusion: The left MILADYS is mildly reduced. Waveforms are consistent with popliteal disease. Normal digital pressures.  This is a slight decrease compared to prior exam.          Labs Pending at Discharge:  Pending Labs       Order Current Status    Anaerobic Culture - Wound, Groin, left Preliminary result    Anaerobic Culture - Wound, Groin, left Preliminary result    Anaerobic Culture - Wound, Groin, left Preliminary result    Blood Culture - Blood, Arm, Left Preliminary result    Blood Culture - Blood, Arm, Right Preliminary result              Time spent on Discharge including face to face service:  >30 minutes    Electronically signed by Rolando Brown DO, 11/30/24, 10:27 AM EST.

## 2024-11-30 NOTE — SIGNIFICANT NOTE
Placed patient onto home VAC unit after discussing vendor contract and financials with patient and spouse.   Patient agreeable to home VAC placement.   Aware MD will still need to approve DC based on status but home VAC applied as per prior wound nurse anticipating DC today/tomorrow.   Wife and patient both voice understanding of home vac charge, cannister change (patient demonstrated), charging, and what supplies to bring to visits.   Aware of showering precautions to maintain seal   Aware if seal lost and unable to reseal within 2 hours, remove dressing and place NS Moist gauze to site until can be seen for wound care.   Both patient and spouse voiced understanding and signed vendor contract for rental equipment  Supplies given.     [id number] -home vac unit     Hospital vac removed, cleaned, and taken to decon in [facility] for vendor retrieval.

## 2024-12-01 LAB
BACTERIA SPEC AEROBE CULT: NORMAL
BACTERIA SPEC AEROBE CULT: NORMAL

## 2024-12-02 ENCOUNTER — HOSPITAL ENCOUNTER (OUTPATIENT)
Dept: INFUSION THERAPY | Facility: HOSPITAL | Age: 44
Discharge: HOME | End: 2024-12-02
Admitting: INTERNAL MEDICINE
Payer: COMMERCIAL

## 2024-12-02 VITALS
DIASTOLIC BLOOD PRESSURE: 79 MMHG | TEMPERATURE: 97.7 F | SYSTOLIC BLOOD PRESSURE: 143 MMHG | OXYGEN SATURATION: 100 % | HEART RATE: 82 BPM | RESPIRATION RATE: 16 BRPM

## 2024-12-02 DIAGNOSIS — Z46.89 ENCOUNTER FOR MANAGEMENT OF WOUND VAC: Primary | ICD-10-CM

## 2024-12-02 DIAGNOSIS — L02.91 ABSCESS: ICD-10-CM

## 2024-12-02 DIAGNOSIS — L02.91 ABSCESS: Primary | ICD-10-CM

## 2024-12-02 LAB
BACTERIA SPEC ANAEROBE CULT: NORMAL

## 2024-12-02 PROCEDURE — 97605 NEG PRS WND THER DME<=50SQCM: CPT

## 2024-12-02 NOTE — SIGNIFICANT NOTE
Wound Eval / Progress Noted     [facility]     Patient Name: Luis Angel Jade  : 1980  MRN: [id number]  Today's Date: 2024                 Admit Date: 2024    Visit Dx:  No diagnosis found.      * No active hospital problems. *        Past Medical History:   Diagnosis Date    HLD (hyperlipidemia)     Hypertension     S/P appendectomy 2021    Vascular disease         Past Surgical History:   Procedure Laterality Date    AORTA BIFEMORAL BYPASS      EXPLORATORY LAPAROTOMY N/A 2021    Procedure: EXPLORATORY LAPAROTOMY, LYSIS OF ADHESIONS,  APPENDECTOMY,;  Surgeon: Siddhartha Adams MD;  Location: [facility] MAIN OR;  Service: General;  Laterality: N/A;    HERNIA REPAIR      INCISION AND DRAINAGE OF WOUND Left 2024    Procedure: INCISION AND DRAINAGE LOWER EXTREMITY;  Surgeon: Elgin Finley MD;  Location: [facility] MAIN OR;  Service: Vascular;  Laterality: Left;         Physical Assessment:  Wound 24 Left anterior groin (Active)   Wound Image   24   Dressing Appearance intact;dry 24   Closure None 24   Base red;moist 24   Red (%), Wound Tissue Color 100 24   Periwound intact;dry;pink 24   Periwound Temperature warm 24   Periwound Skin Turgor soft 24   Edges open 24   Wound Length (cm) 4.8 cm 24   Wound Width (cm) 0.3 cm 24   Wound Depth (cm) 0.9 cm 24   Wound Surface Area (cm^2) 1.44 cm^2 24   Wound Volume (cm^3) 1.296 cm^3 24   Undermining [Depth (cm)/Location] circumfrential underming with deepest point at 9 o'clock measuring at 0.3 cm. 24   Drainage Characteristics/Odor serosanguineous 24   Drainage Amount small 24   Care, Wound cleansed with;irrigated with;sterile normal saline;negative pressure wound therapy 24   Dressing Care dressing applied;foam;transparent film 24    Periwound Care barrier film applied 12/02/24 0900       NPWT (Negative Pressure Wound Therapy) 11/27/24 0822 Left Groin (Active)   Therapy Setting continuous therapy 12/02/24 0900   Dressing foam, black 12/02/24 0900   Pressure Setting 125 mmHg 12/02/24 0900   Sponges Inserted 2 12/02/24 0900   Sponges Removed 1 12/02/24 0900   Finger sweep complete Yes 12/02/24 0900        Wound Check / Follow-up: Patient seen today for a wound eval and treat.  Patient is s/p incision and drainage of the left groin.  Spouse was present at the bedside.    Wound VAC functioning appropriately with no alarms noted, foam is compressed.  Dressing removed with adhesive remover.  Wound base is red and moist with granular tissue present throughout the wound base.  Periwound is soft and intact, no induration present.  There is circumferential undermining noted to the wound with the deepest point being at 9 o'clock.  Wound does appear to be responding appropriately to the current treatment with the negative pressure wound VAC therapy.  Cleansed and irrigated the wound with normal saline.  Applied Skin-Prep to the periwound.  Filled the wound base with 1 black foam and secured with transparent drape.  Applied a second black foam as a track pad to the left anterior thigh and secured with transparent drape.  Wound VAC connected, foam well compressed; no alarms noted.    Patient did not take pain medication prior to arrival to the visit, instructed the patient to take pain medication before each visit to assist with pain management.    Impression: Surgical incision with secondary closure, negative pressure wound VAC therapy.    Short term goals: Regain skin integrity, skin protection, negative pressure wound VAC therapy on Mondays, Wednesdays, Fridays.    Sandra Feliz RN    12/2/2024    13:26 EST

## 2024-12-04 ENCOUNTER — HOSPITAL ENCOUNTER (OUTPATIENT)
Dept: INFUSION THERAPY | Facility: HOSPITAL | Age: 44
Discharge: HOME | End: 2024-12-04
Admitting: INTERNAL MEDICINE
Payer: COMMERCIAL

## 2024-12-04 ENCOUNTER — READMISSION MANAGEMENT (OUTPATIENT)
Dept: CALL CENTER | Facility: HOSPITAL | Age: 44
End: 2024-12-04
Payer: COMMERCIAL

## 2024-12-04 VITALS
RESPIRATION RATE: 20 BRPM | SYSTOLIC BLOOD PRESSURE: 142 MMHG | OXYGEN SATURATION: 99 % | DIASTOLIC BLOOD PRESSURE: 92 MMHG | TEMPERATURE: 98.4 F | HEART RATE: 85 BPM

## 2024-12-04 DIAGNOSIS — Z46.89 ENCOUNTER FOR MANAGEMENT OF WOUND VAC: Primary | ICD-10-CM

## 2024-12-04 DIAGNOSIS — L02.91 ABSCESS: ICD-10-CM

## 2024-12-04 PROCEDURE — 97605 NEG PRS WND THER DME<=50SQCM: CPT

## 2024-12-04 NOTE — OUTREACH NOTE
General Surgery Week 1 Survey      Flowsheet Row Responses   [facility] patient discharged from? [facility]   Does the patient have one of the following disease processes/diagnoses(primary or secondary)? General Surgery   Week 1 attempt successful? Yes   Call start time 1142   Call end time 1153   Is patient permission given to speak with other caregiver? Yes   Person spoke with today (if not patient) and relationship Lisbeth-wife.   Meds reviewed with patient/caregiver? Yes   Is the patient having any side effects they believe may be caused by any medication additions or changes? No   Does the patient have all medications related to this admission filled (includes all antibiotics, pain medications, etc.) Yes   Is the patient taking all medications as directed (includes completed medication regime)? Yes   Does the patient have a follow up appointment scheduled with their surgeon? Yes   Has the patient kept scheduled appointments due by today? N/A   Comments Vascular surgeon appt.   Has home health visited the patient within 72 hours of discharge? N/A   Has all DME been delivered? Yes   DME comments Wound vac in place.   Psychosocial issues? No   Psychosocial comments Wife is nonweight bearing with a fractured leg, but states has teenage children assisting them when needed. Also has family who live next door.   Did the patient receive a copy of their discharge instructions? Yes   Nursing interventions Reviewed instructions with patient   What is the patient's perception of their health status since discharge? Improving   Nursing interventions Nurse provided patient education   Is the patient /caregiver able to teach back basic post-op care? Drive as instructed by MD in discharge instructions, Take showers only when approved by MD-sponge bathe until then, No tub bath, swimming, or hot tub until instructed by MD, Keep incision areas clean,dry and protected, Do not remove steri-strips, Lifting as instructed by MD in  discharge instructions   Is the patient/caregiver able to teach back signs and symptoms of incisional infection? Increased redness, swelling or pain at the incisonal site, Increased drainage or bleeding, Incisional warmth, Pus or odor from incision, Fever   Is the patient/caregiver able to teach back steps to recovery at home? Set small, achievable goals for return to baseline health, Rest and rebuild strength, gradually increase activity, Practice good oral hygiene, Eat a well-balance diet   If the patient is a current smoker, are they able to teach back resources for cessation? Not a smoker   Is the patient/caregiver able to teach back the hierarchy of who to call/visit for symptoms/problems? PCP, Specialist, Home health nurse, Urgent Care, ED, 911 Yes   Week 1 call completed? Yes   Graduated Yes   Is the patient interested in additional calls from an ambulatory ? No   Would this patient benefit from a Referral to [facility] Social Work? No   Wrap up additional comments Spouse states patient is improving, and has returned to work. States wound vac is in place. Denies any s/s of worsening infection. States believes patient now has oral thrush, and will text his PCP for evaluation. Denies any needs today. Complimented a nurse, Marlee, from hospital stay-relayed to appropriate leadership.   Call end time 1153            Dagmar DOUGLASS - Registered Nurse

## 2024-12-05 LAB
QT INTERVAL: 371 MS
QTC INTERVAL: 409 MS

## 2024-12-06 ENCOUNTER — HOSPITAL ENCOUNTER (OUTPATIENT)
Dept: INFUSION THERAPY | Facility: HOSPITAL | Age: 44
Discharge: HOME | End: 2024-12-06
Payer: COMMERCIAL

## 2024-12-06 VITALS
HEART RATE: 97 BPM | OXYGEN SATURATION: 100 % | SYSTOLIC BLOOD PRESSURE: 127 MMHG | DIASTOLIC BLOOD PRESSURE: 85 MMHG | TEMPERATURE: 98.1 F | RESPIRATION RATE: 20 BRPM

## 2024-12-06 DIAGNOSIS — L02.91 ABSCESS: ICD-10-CM

## 2024-12-06 DIAGNOSIS — Z46.89 ENCOUNTER FOR MANAGEMENT OF WOUND VAC: Primary | ICD-10-CM

## 2024-12-06 PROCEDURE — G0463 HOSPITAL OUTPT CLINIC VISIT: HCPCS

## 2024-12-09 ENCOUNTER — HOSPITAL ENCOUNTER (OUTPATIENT)
Dept: INFUSION THERAPY | Facility: HOSPITAL | Age: 44
Discharge: HOME | End: 2024-12-09
Admitting: INTERNAL MEDICINE
Payer: COMMERCIAL

## 2024-12-09 VITALS
DIASTOLIC BLOOD PRESSURE: 87 MMHG | OXYGEN SATURATION: 99 % | RESPIRATION RATE: 20 BRPM | SYSTOLIC BLOOD PRESSURE: 137 MMHG | TEMPERATURE: 98.2 F | HEART RATE: 78 BPM

## 2024-12-09 DIAGNOSIS — Z46.89 ENCOUNTER FOR MANAGEMENT OF WOUND VAC: Primary | ICD-10-CM

## 2024-12-09 DIAGNOSIS — L02.91 ABSCESS: ICD-10-CM

## 2024-12-09 PROCEDURE — G0463 HOSPITAL OUTPT CLINIC VISIT: HCPCS

## 2024-12-11 ENCOUNTER — HOSPITAL ENCOUNTER (OUTPATIENT)
Dept: INFUSION THERAPY | Facility: HOSPITAL | Age: 44
Discharge: HOME | End: 2024-12-11
Admitting: INTERNAL MEDICINE
Payer: COMMERCIAL

## 2024-12-11 VITALS
DIASTOLIC BLOOD PRESSURE: 100 MMHG | SYSTOLIC BLOOD PRESSURE: 166 MMHG | RESPIRATION RATE: 20 BRPM | OXYGEN SATURATION: 100 % | HEART RATE: 86 BPM | TEMPERATURE: 97.5 F

## 2024-12-11 DIAGNOSIS — Z46.89 ENCOUNTER FOR MANAGEMENT OF WOUND VAC: Primary | ICD-10-CM

## 2024-12-11 DIAGNOSIS — L02.91 ABSCESS: ICD-10-CM

## 2024-12-11 PROCEDURE — G0463 HOSPITAL OUTPT CLINIC VISIT: HCPCS

## 2024-12-11 NOTE — SIGNIFICANT NOTE
Wound Eval / Progress Noted     [facility]     Patient Name: Luis Angel Jade  : 1980  MRN: [id number]  Today's Date: 2024                 Admit Date: 2024    Visit Dx:    ICD-10-CM ICD-9-CM   1. Encounter for management of wound VAC  Z46.89 V53.99   2. Abscess  L02.91 682.9         * No active hospital problems. *        Past Medical History:   Diagnosis Date    HLD (hyperlipidemia)     Hypertension     S/P appendectomy 2021    Vascular disease         Past Surgical History:   Procedure Laterality Date    AORTA BIFEMORAL BYPASS      EXPLORATORY LAPAROTOMY N/A 2021    Procedure: EXPLORATORY LAPAROTOMY, LYSIS OF ADHESIONS,  APPENDECTOMY,;  Surgeon: Siddhartha Adams MD;  Location: [facility] MAIN OR;  Service: General;  Laterality: N/A;    HERNIA REPAIR      INCISION AND DRAINAGE OF WOUND Left 2024    Procedure: INCISION AND DRAINAGE LOWER EXTREMITY;  Surgeon: Elgin Finley MD;  Location: [facility] MAIN OR;  Service: Vascular;  Laterality: Left;         Physical Assessment:  Wound 24 0805 Left anterior groin (Active)   Dressing Appearance dry;intact 24 [facility]   Closure None 24 [facility]   Base moist;red 24 [facility]   Periwound dry;intact 24 [facility]   Periwound Temperature warm 24 [facility]   Periwound Skin Turgor soft 24 1640   Edges open 24 1640   Wound Length (cm) 4.8 cm 24 1640   Wound Width (cm) 0.5 cm 24 1640   Wound Depth (cm) 0.2 cm 24 1640   Wound Surface Area (cm^2) 2.4 cm^2 24 1640   Wound Volume (cm^3) 0.48 cm^3 24 1640   Drainage Characteristics/Odor serosanguineous 24 1640   Drainage Amount scant 24 1640   Care, Wound cleansed with;irrigated with;sterile normal saline 24 1640   Dressing Care dressing applied;dressing moistened;foam;silicone border foam 24 1640   Periwound Care absorptive dressing applied 24 [facility]      Wound Check / Follow-up: Patient seen today for wound follow-up and  dressing change in outpatient nursing services. Patient is awake, alert, and oriented at time of visit. He denies any issues with wound or dressing.    Removed dressing with no issues. Wound base to left groin with red, moist tissue present. Periwound tissue is dry and intact. Wound appears to be responding to current treatment. Photo obtained but did not save in documentation system. Cleansed and irrigated wound with normal saline. Applied normal saline moistened purple foam (Hydrofera) then secured with silicone border dressing. Recommending to continue current treatment. Patient requesting to come back once weekly for wound checks / follow-up. He states he and his spouse can change dressings at home the other days. Discussed with him to change dressings Monday, Wednesday, Friday and to come back once weekly. Patient verbalized understanding and is to return next Friday 12/20/2024.      Impression: Surgical wound with delayed closure.      Short term goals: Regain skin integrity, skin protection, 3x weekly dressing changes, 1x weekly wound checks / follow-up.      Jes Villalpando RN    12/11/2024    17:50 EST

## 2024-12-16 ENCOUNTER — OFFICE VISIT (OUTPATIENT)
Dept: WOUND CARE | Facility: HOSPITAL | Age: 44
End: 2024-12-16
Payer: COMMERCIAL

## 2024-12-16 VITALS
TEMPERATURE: 97.5 F | SYSTOLIC BLOOD PRESSURE: 136 MMHG | HEART RATE: 74 BPM | DIASTOLIC BLOOD PRESSURE: 82 MMHG | RESPIRATION RATE: 18 BRPM

## 2024-12-16 DIAGNOSIS — Z78.9 PRESENCE OF SURGICAL INCISION: Primary | ICD-10-CM

## 2024-12-16 DIAGNOSIS — L02.214 ABSCESS OF LEFT GROIN: ICD-10-CM

## 2024-12-16 PROCEDURE — G0463 HOSPITAL OUTPT CLINIC VISIT: HCPCS | Performed by: NURSE PRACTITIONER

## 2024-12-16 PROCEDURE — 99213 OFFICE O/P EST LOW 20 MIN: CPT | Performed by: NURSE PRACTITIONER

## 2024-12-16 NOTE — PROGRESS NOTES
Chief Complaint  Wound Check (New patient visit for abscess. Patient is seen at [facility] once a week and performs dressing changes himself twice a week.)    Subjective      History of Present Illness    Luis Angel Jade  is a 44 y.o. male with surgical wound to the left groin.    Patient was admitted to [facility] from 11/26/2024 to 11/30/2024 with an abscess to left groin.  The patient underwent an incision and drainage of this area on 11/27/2024 with Dr. Finley, vascular surgery, as there was some concern for aortobifem bypass graft site involvement.  During the procedure, no graft site involvement was identified.  Patient initially had a wound VAC placed with silver foam and was receiving dressing changes 3 times per week.    The wound culture obtained from the left groin wound reflected normal skin pierre.    He was discharged home with a home wound VAC unit and presenting to [facility] at [facility] for continued 3 times per week dressing changes.  The wound VAC has since been discontinued and patient has been doing Hydrofera Blue to the wound base 3 times per week.     He presented today with an intact dressing.  He states that he changes the outer bandage daily after showering and also at times has to change the Hydrofera Blue more often but for the most part has stuck with the 3 times per week.    Patient is due to follow-up with vascular surgery on 12/26/2024.    Patient has a significant medical history of peripheral vascular disease, hyperlipidemia.  He is not diabetic.  He is a current smoker.      Allergies:  Vancomycin      Current Outpatient Medications:     aspirin 81 MG EC tablet, Take 1 tablet by mouth Every Night., Disp: , Rfl:     HYDROcodone-acetaminophen (NORCO) 5-325 MG per tablet, Take 1 tablet by mouth Every 6 (Six) Hours As Needed for Moderate Pain for up to 9 doses. (Patient not taking: Reported on 12/16/2024), Disp: 9 tablet, Rfl: 0    nicotine (NICODERM CQ) 21 MG/24HR patch, Place  1 patch on the skin as directed by provider Daily. (Patient not taking: Reported on 12/16/2024), Disp: 14 each, Rfl: 0    rosuvastatin (CRESTOR) 10 MG tablet, Take 1 tablet by mouth Every Night., Disp: , Rfl:     valsartan (DIOVAN) 160 MG tablet, Take 1 tablet by mouth Daily., Disp: , Rfl:     Past Medical History:   Diagnosis Date    HLD (hyperlipidemia)     Hypertension     S/P appendectomy 11/22/2021    Vascular disease      Past Surgical History:   Procedure Laterality Date    AORTA BIFEMORAL BYPASS      EXPLORATORY LAPAROTOMY N/A 11/21/2021    Procedure: EXPLORATORY LAPAROTOMY, LYSIS OF ADHESIONS,  APPENDECTOMY,;  Surgeon: Siddhartha Adams MD;  Location: [facility] MAIN OR;  Service: General;  Laterality: N/A;    HERNIA REPAIR      INCISION AND DRAINAGE OF WOUND Left 11/27/2024    Procedure: INCISION AND DRAINAGE LOWER EXTREMITY;  Surgeon: Elgin Finley MD;  Location: [facility] MAIN OR;  Service: Vascular;  Laterality: Left;     Social History     Socioeconomic History    Marital status:    Tobacco Use    Smoking status: Every Day     Current packs/day: 1.00     Types: Cigarettes    Smokeless tobacco: Never   Substance and Sexual Activity    Alcohol use: No    Drug use: No    Sexual activity: Defer           Objective     Vitals:    12/16/24 1404   BP: 136/82   BP Location: Left arm   Patient Position: Sitting   Pulse: 74   Resp: 18  Comment: 100 02   Temp: 97.5 °F (36.4 °C)   TempSrc: Temporal   PainSc: 0-No pain     There is no height or weight on file to calculate BMI.    The following data has been reviewed by OG Lizama on 12/16/2024   CMP          11/26/2024    15:49 11/27/2024    05:26 11/28/2024    05:16   CMP   Glucose 103  109  154    BUN 11  14  13    Creatinine 0.82  0.86  0.79    EGFR 111.1  109.5  112.3    Sodium 139  140  141    Potassium 4.1  4.6  3.8    Chloride 105  107  108    Calcium 9.2  9.6  8.6    Total Protein 7.2  7.3     Albumin 4.0  4.0     Globulin 3.2  3.3     Total  Bilirubin 0.5  0.3     Alkaline Phosphatase 77  75     AST (SGOT) 29  32     ALT (SGPT) 41  45     Albumin/Globulin Ratio 1.3  1.2     BUN/Creatinine Ratio 13.4  16.3  16.5    Anion Gap 10.1  10.9  12.3      CBC          11/27/2024    05:26 11/28/2024    05:16 11/29/2024    05:20   CBC   WBC 9.80  13.82  9.94    RBC 4.85  4.36  4.52    Hemoglobin 14.7  13.3  13.5    Hematocrit 46.3  41.6  42.9    MCV 95.5  95.4  94.9    MCH 30.3  30.5  29.9    MCHC 31.7  32.0  31.5    RDW 13.5  13.4  13.6    Platelets 200  195  208          Culture results from last 30 days   Lab 11/27/24  0834 11/27/24  0825 11/27/24  0823 11/26/24  1527   WOUNDCX No growth at 3 days No growth at 3 days No growth at 3 days Scant growth (1+) Normal Skin Shanelle   ANACX No anaerobes isolated at 5 days No anaerobes isolated at 5 days No anaerobes isolated at 5 days  --         No Images in the past 120 days found..     STEADI Fall Risk Assessment has not been completed.     Review of Systems     ROS:  Per HPI.     I have reviewed the HPI and ROS as documented by MA/RN. OG Lizama    Physical Exam     NAD  AAOx3, pleasant, cooperative    Left groin: Open surgical wound along the left groin crease.  Small amount of open healthy red tissue visible.  Scant amount of serosanguineous drainage.  The remaining periwound tissue is pink and epithelialized.  There is some visible indention and scarring to the periwound margins.  No erythema or edema.  No TTP.  No active signs of infection.    See photos for details.    Left groin:        Result Review :  The following data was reviewed by: OG Lizama on 12/16/2024:    Hospital admission notes, op note, prior wound images and notes from [facility] treatments.  Labs and wound culture.    Assessment and Plan   Diagnoses and all orders for this visit:    1. Presence of surgical incision (Primary)    2. Abscess of left groin    Patient presents today with an open surgical wound to the left groin  crease.  Patient has been going to [facility] for ongoing wound care status post hospital admission with surgical debridement of an abscess to the left groin.  Patient's wound has been responding well to his current treatment and is demonstrating good healing.  Recommending that the patient begin daily dressing changes that consists of cleansing with normal saline and gauze, blotting dry, applying a silver impregnated Hydrofiber, such as Aquacel Ag, to the open wound base and then securing with a dry dressing.    Patient may remove the dressing for showering however is encouraged to use a body wash that is free from perfumes and moisturizers.    Patient is additionally encouraged to monitor the area for excess moisture formation.  Patient is a  and also reports walking a lot during the day.  Discussed with patient the risk of causing trauma to the wound area through friction and shearing.  Recommending that the patient continue to protect the area until his next follow-up or until evaluated by vascular surgery.    Follow-up in 2 to 3 weeks.    Patient was given instructions and counseling regarding their condition or for health maintenance advice, as well as the wound care plan and recommendations. They understand and agree with the plan.  They will follow back up here in the clinic but are instructed to contact us in the interim should they have any new, returning, or worsening symptoms or concerns. Please see specific information pulled into the AVS if appropriate.     Dragon Dictation utilized for chart completion.    Follow Up   Return in about 3 weeks (around 1/6/2025).      OG Lizama

## 2024-12-26 ENCOUNTER — OFFICE VISIT (OUTPATIENT)
Dept: VASCULAR SURGERY | Facility: HOSPITAL | Age: 44
End: 2024-12-26
Payer: COMMERCIAL

## 2024-12-26 VITALS
TEMPERATURE: 98.2 F | SYSTOLIC BLOOD PRESSURE: 132 MMHG | RESPIRATION RATE: 18 BRPM | DIASTOLIC BLOOD PRESSURE: 70 MMHG | OXYGEN SATURATION: 98 % | HEART RATE: 72 BPM

## 2024-12-26 DIAGNOSIS — L02.419 CELLULITIS AND ABSCESS OF LEG, EXCEPT FOOT: Primary | ICD-10-CM

## 2024-12-26 DIAGNOSIS — L03.119 CELLULITIS AND ABSCESS OF LEG, EXCEPT FOOT: Primary | ICD-10-CM

## 2024-12-26 PROCEDURE — G0463 HOSPITAL OUTPT CLINIC VISIT: HCPCS | Performed by: SURGERY

## 2024-12-26 NOTE — PROGRESS NOTES
"     [facility]   Follow up Office      Chief Complaint: Left groin abscess in a patient with previous aortobifem bypass     HPI:     Luis Angel Jade is a 44 y.o. male with history of aortobifem bypass in 2021 by Dr. Gray who has continued to smoke and is a .  He underwent washout of L groin abscess with I&D on 11-27-24 and went home with a wound vac which has since been discontinued.  The wound is healing well with only a small area medially which has a superficial wound remaining and being cleaned and covered daily.  He has stopped smoking completely for 1 month today.               The patient states he had never had a abscess in the groin like this before however he did have an intra-abdominal abscess after an appendectomy 2 years ago requiring CT-guided drainage.  Once again I discussed with the patient the fact that smoking cessation is not an option at this point if he wants to avoid future limb loss, prolonged hospital stays and potential death.  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  He finished his full course of 10 days of PO abx after discharge.  No F/C or night sweats.      Review of Systems     General: Patient denies unintended weight loss or weight gain.  Denies fatigue, and weakness.  Denies any fevers, chills or night sweats.  HEENT: Denies any recent visual or hearing changes.  Denies any headaches, rhinorrhea or epistaxis.  Cardiac: Patient denies any significant chest pain, chest tightness or palpitations.  Denies dyspnea on exertion or denies any paroxysmal nocturnal dyspnea orthopnea.  Respiratory: Patient denies any shortness of breath, cough, sputum production or hemoptysis.    Gastrointestinal: Patient denies any significant changes in appetite, nausea, vomiting or dysphagia.  Denies any hematemesis, melena or " hematochezia.  Denies any abdominal pain or tenderness to palpation.  Genitourinary: Patient denies any oliguria, dysuria or hematuria.  Vascular: Patient denies any claudication, rest pain or tissue loss.  Denies any history of thrombosis or embolic phenomenon.  Endocrine: Denies any hot or cold intolerance, is not a diabetic and denies any thyroid problems.  Neurologic: Patient denies any loss of sensation, numbness or tingling.  Denies any syncopal episodes, blackouts or seizure history.  Psychiatric: Patient denies any anxiety, depression or mood disorder.        Personal History      Medical History        Past Medical History:   Diagnosis Date    HLD (hyperlipidemia)      Hypertension      S/P appendectomy 11/22/2021    Vascular disease              Surgical History         Past Surgical History:   Procedure Laterality Date    AORTA BIFEMORAL BYPASS        EXPLORATORY LAPAROTOMY N/A 11/21/2021     Procedure: EXPLORATORY LAPAROTOMY, LYSIS OF ADHESIONS,  APPENDECTOMY,;  Surgeon: Siddhartha Adams MD;  Location: [facility] OR;  Service: General;  Laterality: N/A;    HERNIA REPAIR                Family History: family history includes Heart disease in his father. Otherwise pertinent FHx was reviewed and not pertinent to current issue.     Social History:  reports that he has been smoking cigarettes. He has never used smokeless tobacco. He reports that he does not drink alcohol and does not use drugs.     Home Medications:  aspirin, rosuvastatin, and valsartan        Allergies:  Allergies         Allergies   Allergen Reactions    Vancomycin Other (See Comments)       Redmans-itching with redness               Objective  Objective      Vitals:   Temp:  [97.7 °F (36.5 °C)] 97.7 °F (36.5 °C)  Heart Rate:  [79-96] 79  Resp:  [18] 18  BP: (118-142)/(78-92) 142/92     Physical Exam                         General: Awake, alert, NAD              Eyes:  CEDRIC, EOMI, Sclera non-icteric              Neck: Supple, no LAD or  "carotid bruits present              Lungs: Clear to auscultation B              Heart: RRR              Abdomen: Soft, nontender, nondistended with positive bowel sounds              Ext: No clubbing cyanosis or edema.  B radial and femoral and pedal pulses palp.  Left groin with indurated area of erythema and now started draining some  colored fluid from a punctate opening.  It is very tender to palpation.              Neuro: CN's II-XII grossly intact.  No focal or lateralizing deficits present currently.     Pertinent Lab Data:     CBC:          Lab 11/26/24  1549   WBC 9.50   HEMOGLOBIN 14.8   HEMATOCRIT 45.8   PLATELETS 192   NEUTROS ABS 6.02   IMMATURE GRANS (ABS) 0.02   LYMPHS ABS 2.59   MONOS ABS 0.47   EOS ABS 0.35   MCV 94.2         CMP:             Lab 11/26/24  1549   SODIUM 139   POTASSIUM 4.1   CHLORIDE 105   CO2 23.9   ANION GAP 10.1   BUN 11   CREATININE 0.82   EGFR 111.1   GLUCOSE 103*   CALCIUM 9.2   MAGNESIUM 2.2   TOTAL PROTEIN 7.2   ALBUMIN 4.0   GLOBULIN 3.2   ALT (SGPT) 41   AST (SGOT) 29   BILIRUBIN 0.5   ALK PHOS 77         No radiology results for the last 7 days                  Assessment & Plan  Assessment / Plan      Active Hospital Problems:       Active Hospital Problems     Diagnosis      **Abscess           Assessment/plan:   Patient is a 44-year-old  who underwent aortobifemoral bypass by Dr. Gray 2021 for significant iliac disease with thrombus at the time now S/p L groin abscess I&D with washout.  He has completely stopped smoking.  2.  Has BLE arterial duplex studies ordered previously for surveillance studies and some mild leg symptoms by Eunice Rodriguez our NP.  3.  He has a history of staph abscess after appendectomy few years ago and small bowel obstruction requiring CT-guided drainage.  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  4.  Past medical history significant for nicotine dependence now resolved, hypertension and dyslipidemia.  5.  Continue with local wound care and cleansing of wound until completely healed and complete smoking cessation.   6.  Can continue with annual surveillance studies going forward after next visit with BLE arterial duplex in a few weeks if no significant changes or symptoms as expected.

## 2025-01-09 ENCOUNTER — OFFICE VISIT (OUTPATIENT)
Dept: WOUND CARE | Facility: HOSPITAL | Age: 45
End: 2025-01-09
Payer: COMMERCIAL

## 2025-01-09 VITALS
DIASTOLIC BLOOD PRESSURE: 75 MMHG | HEART RATE: 78 BPM | SYSTOLIC BLOOD PRESSURE: 121 MMHG | RESPIRATION RATE: 18 BRPM | TEMPERATURE: 98 F

## 2025-01-09 DIAGNOSIS — Z78.9 PRESENCE OF SURGICAL INCISION: Primary | ICD-10-CM

## 2025-01-09 DIAGNOSIS — L02.214 ABSCESS OF LEFT GROIN: ICD-10-CM

## 2025-01-09 NOTE — PROGRESS NOTES
Chief Complaint  No chief complaint on file.    Subjective      History of Present Illness    Luis Angel Jade  is a 44 y.o. male with surgical wound to the left groin.    Patient was admitted to [facility] from 11/26/2024 to 11/30/2024 with an abscess to left groin.  The patient underwent an incision and drainage of this area on 11/27/2024 with Dr. Finley, vascular surgery, as there was some concern for aortobifem bypass graft site involvement.  During the procedure, no graft site involvement was identified.      The wound culture obtained from the left groin wound reflected normal skin pierre.    Patient was doing dressing changes with Aquacel AG and a dry dressing daily until about two weeks ago at which time he states the incision closed and no further dressing was needed.     He presented today with the site open to air.     Patient has a significant medical history of peripheral vascular disease, hyperlipidemia.  He is not diabetic.        Allergies:  Vancomycin      Current Outpatient Medications:     aspirin 81 MG EC tablet, Take 1 tablet by mouth Every Night., Disp: , Rfl:     HYDROcodone-acetaminophen (NORCO) 5-325 MG per tablet, Take 1 tablet by mouth Every 6 (Six) Hours As Needed for Moderate Pain for up to 9 doses. (Patient not taking: Reported on 12/26/2024), Disp: 9 tablet, Rfl: 0    nicotine (NICODERM CQ) 21 MG/24HR patch, Place 1 patch on the skin as directed by provider Daily. (Patient not taking: Reported on 12/26/2024), Disp: 14 each, Rfl: 0    rosuvastatin (CRESTOR) 10 MG tablet, Take 1 tablet by mouth Every Night., Disp: , Rfl:     valsartan (DIOVAN) 160 MG tablet, Take 1 tablet by mouth Daily., Disp: , Rfl:     Past Medical History:   Diagnosis Date    HLD (hyperlipidemia)     Hypertension     S/P appendectomy 11/22/2021    Vascular disease      Past Surgical History:   Procedure Laterality Date    AORTA BIFEMORAL BYPASS      EXPLORATORY LAPAROTOMY N/A 11/21/2021    Procedure:  EXPLORATORY LAPAROTOMY, LYSIS OF ADHESIONS,  APPENDECTOMY,;  Surgeon: Siddhartha Adams MD;  Location: [facility] MAIN OR;  Service: General;  Laterality: N/A;    HERNIA REPAIR      INCISION AND DRAINAGE OF WOUND Left 11/27/2024    Procedure: INCISION AND DRAINAGE LOWER EXTREMITY;  Surgeon: Elgin Finley MD;  Location: [facility] MAIN OR;  Service: Vascular;  Laterality: Left;     Social History     Socioeconomic History    Marital status:    Tobacco Use    Smoking status: Former     Current packs/day: 1.00     Types: Cigarettes    Smokeless tobacco: Never    Tobacco comments:     Stopped one month ago   Substance and Sexual Activity    Alcohol use: No    Drug use: No    Sexual activity: Defer           Objective     Vitals:    01/09/25 1504   BP: 121/75   BP Location: Left arm   Patient Position: Sitting   Pulse: 78   Resp: 18  Comment: 99 02   Temp: 98 °F (36.7 °C)   TempSrc: Oral   PainSc: 0-No pain     There is no height or weight on file to calculate BMI.    The following data has been reviewed by OG Lizama on 01/09/2025   CMP          11/26/2024    15:49 11/27/2024    05:26 11/28/2024    05:16   CMP   Glucose 103  109  154    BUN 11  14  13    Creatinine 0.82  0.86  0.79    EGFR 111.1  109.5  112.3    Sodium 139  140  141    Potassium 4.1  4.6  3.8    Chloride 105  107  108    Calcium 9.2  9.6  8.6    Total Protein 7.2  7.3     Albumin 4.0  4.0     Globulin 3.2  3.3     Total Bilirubin 0.5  0.3     Alkaline Phosphatase 77  75     AST (SGOT) 29  32     ALT (SGPT) 41  45     Albumin/Globulin Ratio 1.3  1.2     BUN/Creatinine Ratio 13.4  16.3  16.5    Anion Gap 10.1  10.9  12.3      CBC          11/27/2024    05:26 11/28/2024    05:16 11/29/2024    05:20   CBC   WBC 9.80  13.82  9.94    RBC 4.85  4.36  4.52    Hemoglobin 14.7  13.3  13.5    Hematocrit 46.3  41.6  42.9    MCV 95.5  95.4  94.9    MCH 30.3  30.5  29.9    MCHC 31.7  32.0  31.5    RDW 13.5  13.4  13.6    Platelets 200  195  208                    No Images in the past 120 days found..     STEADI Fall Risk Assessment has not been completed.     Review of Systems     ROS:  Per HPI.     I have reviewed the HPI and ROS as documented by MA/RN. OG Lizama    Physical Exam     NAD  AAOx3, pleasant, cooperative    Left groin: Complete approximation of the surgical incision.  Intact epithelium.    See photos for details.    Left groin:        Result Review :  The following data was reviewed by: OG Lizama on 01/09/2025:    Prior wound care notes and images.  Vascular notes.      Assessment and Plan   Diagnoses and all orders for this visit:    1. Presence of surgical incision (Primary)    2. Abscess of left groin      Patient presents today with complete approximation of the surgical incision within the left groin.  There is intact epithelium with no signs of dehiscence.  There is no induration or concerns for possible recurrent infections.  Recommending that the patient begin diligent skin care/hygiene by applying a moisturizer along the incision line and gently massaging the area to prevent scar tissue formation.    Patient has been instructed to notify his physician of any signs or symptoms of recurrence or with any concerns.    Follow-up as needed.    Patient was given instructions and counseling regarding their condition or for health maintenance advice, as well as the wound care plan and recommendations. They understand and agree with the plan.  They will follow back up here in the clinic but are instructed to contact us in the interim should they have any new, returning, or worsening symptoms or concerns. Please see specific information pulled into the AVS if appropriate.     Dragon Dictation utilized for chart completion.    Follow Up   No follow-ups on file.      OG Lizama

## 2025-01-17 ENCOUNTER — OFFICE VISIT (OUTPATIENT)
Age: 45
End: 2025-01-17
Payer: COMMERCIAL

## 2025-01-17 ENCOUNTER — APPOINTMENT (OUTPATIENT)
Dept: INFUSION THERAPY | Facility: HOSPITAL | Age: 45
End: 2025-01-17
Payer: COMMERCIAL

## 2025-01-17 ENCOUNTER — HOSPITAL ENCOUNTER (OUTPATIENT)
Dept: CARDIOLOGY | Facility: HOSPITAL | Age: 45
Discharge: HOME | End: 2025-01-17
Admitting: NURSE PRACTITIONER
Payer: COMMERCIAL

## 2025-01-17 VITALS
WEIGHT: 195 LBS | DIASTOLIC BLOOD PRESSURE: 84 MMHG | HEART RATE: 76 BPM | RESPIRATION RATE: 18 BRPM | SYSTOLIC BLOOD PRESSURE: 122 MMHG | TEMPERATURE: 97.8 F | HEIGHT: 71 IN | OXYGEN SATURATION: 99 % | BODY MASS INDEX: 27.3 KG/M2

## 2025-01-17 DIAGNOSIS — Z95.828 HISTORY OF AORTO-FEMORAL BYPASS: ICD-10-CM

## 2025-01-17 DIAGNOSIS — I73.9 PERIPHERAL VASCULAR DISEASE: ICD-10-CM

## 2025-01-17 DIAGNOSIS — L03.119 CELLULITIS AND ABSCESS OF LEG, EXCEPT FOOT: Primary | ICD-10-CM

## 2025-01-17 DIAGNOSIS — Z87.891 QUIT SMOKING WITHIN PAST YEAR: ICD-10-CM

## 2025-01-17 DIAGNOSIS — Z95.828 S/P AORTOBIFEMORAL BYPASS SURGERY: ICD-10-CM

## 2025-01-17 DIAGNOSIS — L02.419 CELLULITIS AND ABSCESS OF LEG, EXCEPT FOOT: Primary | ICD-10-CM

## 2025-01-17 LAB
BH CV GRAFT BRACHIAL PRESSURE LEFT: 122 MMHG
BH CV GRAFT BRACHIAL PRESSURE RIGHT: 122 MMHG
BH CV LEA LEFT ANT TIBIAL A DISTAL EDV: 0.21 CM/S
BH CV LEA LEFT ANT TIBIAL A DISTAL PSV: 37.9 CM/S
BH CV LEA LEFT ANT TIBIAL A MID EDV: 2.5 CM/S
BH CV LEA LEFT ANT TIBIAL A MID PSV: 32.5 CM/S
BH CV LEA LEFT ANT TIBIAL A PROX EDV: 2.8 CM/S
BH CV LEA LEFT ANT TIBIAL A PROX PSV: 19.6 CM/S
BH CV LEA LEFT CFA DISTAL EDV: 34.27 CM/S
BH CV LEA LEFT CFA DISTAL PSV: 284.8 CM/S
BH CV LEA LEFT CFA PROX EDV: 19.72 CM/S
BH CV LEA LEFT CFA PROX PSV: 163.2 CM/S
BH CV LEA LEFT DFA PROX EDV: 16.96 CM/S
BH CV LEA LEFT DFA PROX PSV: 156.19 CM/S
BH CV LEA LEFT DPA PRESSURE: 110 MMHG
BH CV LEA LEFT PERONEAL  DISTAL EDV: 0.49 CM/S
BH CV LEA LEFT PERONEAL  DISTAL PSV: 18.5 CM/S
BH CV LEA LEFT PERONEAL  MID EDV: 5.9 CM/S
BH CV LEA LEFT PERONEAL  MID PSV: 27.5 CM/S
BH CV LEA LEFT PERONEAL  PROX EDV: 5.19 CM/S
BH CV LEA LEFT PERONEAL  PROX PSV: 31.42 CM/S
BH CV LEA LEFT POPITEAL A  DISTAL EDV: 11.3 CM/S
BH CV LEA LEFT POPITEAL A  DISTAL PSV: 57.8 CM/S
BH CV LEA LEFT POPITEAL A  MID EDV: 9.15 CM/S
BH CV LEA LEFT POPITEAL A  MID PSV: 50.9 CM/S
BH CV LEA LEFT POPITEAL A  PROX EDV: 8.4 CM/S
BH CV LEA LEFT POPITEAL A  PROX PSV: 49.1 CM/S
BH CV LEA LEFT PTA DISTAL EDV: 7.8 CM/S
BH CV LEA LEFT PTA DISTAL PSV: 45.6 CM/S
BH CV LEA LEFT PTA MID EDV: 7.2 CM/S
BH CV LEA LEFT PTA MID PSV: 51 CM/S
BH CV LEA LEFT PTA PRESSURE: 106 MMHG
BH CV LEA LEFT PTA PROX EDV: 15.6 CM/S
BH CV LEA LEFT PTA PROX PSV: 98.7 CM/S
BH CV LEA LEFT SFA DISTAL EDV: 12 CM/S
BH CV LEA LEFT SFA DISTAL PSV: 87.4 CM/S
BH CV LEA LEFT SFA MID EDV: 10.9 CM/S
BH CV LEA LEFT SFA MID PSV: 82.8 CM/S
BH CV LEA LEFT SFA PROX EDV: 6.4 CM/S
BH CV LEA LEFT SFA PROX PSV: 60.3 CM/S
BH CV LEA LEFT TIBEOPERONEAL EDV: 7.82 CM/S
BH CV LEA LEFT TIBEOPERONEAL PSV: 44.54 CM/S
BH CV LEA RIGHT ANT TIBIAL A DISTAL EDV: 5.2 CM/S
BH CV LEA RIGHT ANT TIBIAL A DISTAL PSV: 30.9 CM/S
BH CV LEA RIGHT ANT TIBIAL A MID EDV: 5.6 CM/S
BH CV LEA RIGHT ANT TIBIAL A MID PSV: 37.4 CM/S
BH CV LEA RIGHT ANT TIBIAL A PROX EDV: 8.2 CM/S
BH CV LEA RIGHT ANT TIBIAL A PROX PSV: 55.4 CM/S
BH CV LEA RIGHT CFA DISTAL EDV: 12.5 CM/S
BH CV LEA RIGHT CFA DISTAL PSV: 127.63 CM/S
BH CV LEA RIGHT CFA PROX EDV: 31.86 CM/S
BH CV LEA RIGHT CFA PROX PSV: 138.41 CM/S
BH CV LEA RIGHT DFA PROX EDV: 8.03 CM/S
BH CV LEA RIGHT DFA PROX PSV: 53.55 CM/S
BH CV LEA RIGHT DPA PRESSURE: 118 MMHG
BH CV LEA RIGHT PERONEAL  DISTAL EDV: 4.8 CM/S
BH CV LEA RIGHT PERONEAL  DISTAL PSV: 29.6 CM/S
BH CV LEA RIGHT PERONEAL  MID EDV: 4.9 CM/S
BH CV LEA RIGHT PERONEAL  MID PSV: 22.3 CM/S
BH CV LEA RIGHT PERONEAL  PROX EDV: 0 CM/S
BH CV LEA RIGHT PERONEAL  PROX PSV: 13.5 CM/S
BH CV LEA RIGHT POPITEAL A  DISTAL EDV: 10.1 CM/S
BH CV LEA RIGHT POPITEAL A  DISTAL PSV: 57.1 CM/S
BH CV LEA RIGHT POPITEAL A  MID EDV: 6.59 CM/S
BH CV LEA RIGHT POPITEAL A  MID PSV: 46.14 CM/S
BH CV LEA RIGHT POPITEAL A  PROX EDV: 6.9 CM/S
BH CV LEA RIGHT POPITEAL A  PROX PSV: 50.7 CM/S
BH CV LEA RIGHT PTA DISTAL EDV: 14.7 CM/S
BH CV LEA RIGHT PTA DISTAL PSV: 68.4 CM/S
BH CV LEA RIGHT PTA MID EDV: 12.6 CM/S
BH CV LEA RIGHT PTA MID PSV: 54.5 CM/S
BH CV LEA RIGHT PTA PRESSURE: 115 MMHG
BH CV LEA RIGHT PTA PROX EDV: 12.6 CM/S
BH CV LEA RIGHT PTA PROX PSV: 59.3 CM/S
BH CV LEA RIGHT SFA DISTAL EDV: 18.3 CM/S
BH CV LEA RIGHT SFA DISTAL PSV: 70.3 CM/S
BH CV LEA RIGHT SFA MID EDV: 17.9 CM/S
BH CV LEA RIGHT SFA MID PSV: 124.4 CM/S
BH CV LEA RIGHT SFA PROX EDV: 18 CM/S
BH CV LEA RIGHT SFA PROX PSV: 121.7 CM/S
BH CV LEA RIGHT TIBEOPERONEAL EDV: 8.16 CM/S
BH CV LEA RIGHT TIBEOPERONEAL PSV: 48.62 CM/S
BH CV LOWER ARTERIAL LEFT ABI RATIO: 0.9
BH CV LOWER ARTERIAL RIGHT ABI RATIO: 0.97
BH CV LOWER ARTERIAL RIGHT HIGHEST VELOCITY RATIO: 0.9
LEFT GROIN CFA SYS: 284.2 CM/SEC
RIGHT GROIN CFA SYS: 138.4 CM/SEC

## 2025-01-17 PROCEDURE — 93925 LOWER EXTREMITY STUDY: CPT

## 2025-01-17 PROCEDURE — 99213 OFFICE O/P EST LOW 20 MIN: CPT | Performed by: NURSE PRACTITIONER

## 2025-01-17 NOTE — PROGRESS NOTES
[facility] Vascular Surgery Office Follow Up Note     Date of Encounter: 01/17/2025     MRN Number: [id number]  Name: Luis Angel Jade  Phone Number:      Referred By: Elgin Finley MD  PCP: Kvng De Luna MD    Chief Complaint:    Chief Complaint   Patient presents with    Follow-up     Patient is here as a follow up with a lower extremity duplex today.        Subjective      History of Present Illness:    Luis Angel Jade is a 44 y.o. male presents for follow-up with a lower extremity duplex performed today.  He had a aortobifemoral bypass by Dr. Gray in 2021 for significant iliac disease with thrombus and recently underwent a left groin abscess with I&D on 11/27/2024.  He is doing well, he has not smoked for almost 2 months.    Review of Systems:  ROS  Review of Systems   Constitutional: Negative.   HENT: Negative.    Cardiovascular: Negative.    Respiratory: Negative.    Skin: Negative.    Musculoskeletal: Negative.    Gastrointestinal: Negative.    Neurological: Negative.    Psychiatric/Behavioral: Negative.      I have reviewed the following portions of the patient's history: problem list, current medications, allergies, past surgical history, past medical history, past social history, past family history, and ROS and confirm it's accurate.    Allergies:  Allergies   Allergen Reactions    Vancomycin Other (See Comments)     Redmans-itching with redness       Medications:      Current Outpatient Medications:     aspirin 81 MG EC tablet, Take 1 tablet by mouth Every Night., Disp: , Rfl:     rosuvastatin (CRESTOR) 10 MG tablet, Take 1 tablet by mouth Every Night., Disp: , Rfl:     valsartan (DIOVAN) 160 MG tablet, Take 1 tablet by mouth Daily., Disp: , Rfl:     HYDROcodone-acetaminophen (NORCO) 5-325 MG per tablet, Take 1 tablet by mouth Every 6 (Six) Hours As Needed for Moderate Pain for up to 9 doses. (Patient not taking: Reported on 12/16/2024), Disp: 9 tablet, Rfl: 0    nicotine (NICODERM  "CQ) 21 MG/24HR patch, Place 1 patch on the skin as directed by provider Daily. (Patient not taking: Reported on 12/16/2024), Disp: 14 each, Rfl: 0    History:   Past Medical History:   Diagnosis Date    HLD (hyperlipidemia)     Hypertension     S/P appendectomy 11/22/2021    Vascular disease        Past Surgical History:   Procedure Laterality Date    AORTA BIFEMORAL BYPASS      EXPLORATORY LAPAROTOMY N/A 11/21/2021    Procedure: EXPLORATORY LAPAROTOMY, LYSIS OF ADHESIONS,  APPENDECTOMY,;  Surgeon: Siddhartha Adams MD;  Location: [facility] MAIN OR;  Service: General;  Laterality: N/A;    HERNIA REPAIR      INCISION AND DRAINAGE OF WOUND Left 11/27/2024    Procedure: INCISION AND DRAINAGE LOWER EXTREMITY;  Surgeon: Elgin Finley MD;  Location: [facility] MAIN OR;  Service: Vascular;  Laterality: Left;       Social History     Socioeconomic History    Marital status:    Tobacco Use    Smoking status: Former     Current packs/day: 1.00     Types: Cigarettes    Smokeless tobacco: Never    Tobacco comments:     Stopped one month ago   Substance and Sexual Activity    Alcohol use: No    Drug use: No    Sexual activity: Defer        Family History   Problem Relation Age of Onset    Heart disease Father        Objective     Physical Exam:  Vitals:    01/17/25 1547 01/17/25 1548   BP: 122/77 122/84   BP Location: Right arm Left arm   Patient Position: Lying Lying   Cuff Size: Large Adult Large Adult   Pulse: 76    Resp: 18    Temp: 97.8 °F (36.6 °C)    TempSrc: Temporal    SpO2: 99%    Weight: 88.5 kg (195 lb)    Height: 180.3 cm (71\")    PainSc: 0-No pain       Body mass index is 27.2 kg/m².  BMI is >= 25 and <30. (Overweight) The following options were offered after discussion;: information on healthy weight added to patient's after visit summary      Physical Exam  Physical Exam  Constitutional:       Appearance:Normal.   HENT:      Head: Normocephalic and atraumatic.   Eyes:      Extraocular Movements: Extraocular " movements intact.      Pupils: Pupils are equal, round, and reactive to light.   Neck:      Comments:   Cardiovascular:      Rate and Rhythm: Normal rate and regular rhythm.   Pulmonary:      Effort: Pulmonary effort is normal.      Abdominal:      Palpations: Abdomen is soft.   Musculoskeletal:      Cervical back: Normal range of motion and neck supple.   Skin:     General: Skin is warm and dry.   Neurological:      General: No focal deficit present.      Mental Status: Alert and oriented to person, place, and time.     Imaging/Labs:  I have reviewed the pulmonary results of the arterial duplex performed today.  The right arteries are noted without significant stenosis however the left aortofemoral graft limb has a slight increase in the velocity. ABIs are 0.97 on the right and 0.9 on the left.      Assessment / Plan      Assessment / Plan:  Diagnoses and all orders for this visit:    1. Cellulitis and abscess of leg, except foot (Primary)    2. Peripheral vascular disease    3. History of aorto-femoral bypass    4. Quit smoking within past year    Mr. Jade is doing well, wounds have healed, he recently underwent a left groin abscess with I&D on 11/27/2024 by Dr. Finley.  He had previously had a aortobifemoral bypass by Dr. Gray in 2021 for significant disease with thrombus.  He denies any claudication or ischemic rest pain.  We have discussed in detail his symptoms, testing and interventions, we will plan on annual surveillance, follow-up in 1 year with bilateral lower extremity duplex.  However, I have explained should he have any additional concerns or worsening signs or symptoms that he should follow-up sooner.    I have congratulated him on his smoking cessation efforts and reinforced the benefits.  I have answered all of his questions and he is in agreement with the plan at this time.    Thank you for allowing me to participate in your patient's care.        Follow Up:   Return in about 1 year (around  1/17/2026) for bilateral duplex.   Or sooner for any further concerns or worsening sign and symptoms. If unable to reach us in the office please dial 911 or go to the nearest emergency department.      Eunice FOLEY  [facility] Vascular Surgery

## (undated) DEVICE — SOL NS 500ML

## (undated) DEVICE — SOL IRR NACL 0.9PCT 3000ML

## (undated) DEVICE — GLV SURG BIOGEL LTX PF 7 1/2

## (undated) DEVICE — SUT VIC 3/0 SH 27IN J416H

## (undated) DEVICE — GLV SURG BIOGEL LTX PF 7

## (undated) DEVICE — GENERAL LAPAROSCOPY-LF: Brand: MEDLINE INDUSTRIES, INC.

## (undated) DEVICE — SUT PROLN 6/0 BV1 D/A 30IN 8709H

## (undated) DEVICE — SUT VIC PLS CTD BR 0 TIE 18IN VIL

## (undated) DEVICE — HANDPIECE SET WITH HIGH FLOW TIP AND SUCTION TUBE: Brand: INTERPULSE

## (undated) DEVICE — SUT PDS 1 XLH LP 99IN Z881G

## (undated) DEVICE — DRSNG WND VAC GRANUFOAM SVR SENSATRAC SM

## (undated) DEVICE — ELECTRD BLD EXT EDGE 1P COAT 6.5IN STRL

## (undated) DEVICE — SLV SCD LEG COMFORT KENDALLSCD MD REPROC

## (undated) DEVICE — SUT SILK 3/0 SH CR8 18IN C013D

## (undated) DEVICE — ANTIBACTERIAL UNDYED BRAIDED (POLYGLACTIN 910), SYNTHETIC ABSORBABLE SUTURE: Brand: COATED VICRYL

## (undated) DEVICE — TOTAL TRAY, 16FR 10ML SIL FOLEY, URN: Brand: MEDLINE

## (undated) DEVICE — PROXIMATE RH ROTATING HEAD SKIN STAPLERS (35 WIDE) CONTAINS 35 STAINLESS STEEL STAPLES: Brand: PROXIMATE

## (undated) DEVICE — SUT PROLN SURGILENE 5.0 24IN BLU 9702H

## (undated) DEVICE — STERILE POLYISOPRENE POWDER-FREE SURGICAL GLOVES: Brand: PROTEXIS

## (undated) DEVICE — PENCL SMOKE/EVAC MEGADYNE TELESCP 10FT

## (undated) DEVICE — TRAP,MUCUS SPECIMEN,40CC: Brand: MEDLINE

## (undated) DEVICE — ABDOMINAL VASCULAR-LF: Brand: MEDLINE INDUSTRIES, INC.

## (undated) DEVICE — GLV SURG SENSICARE PI ORTHO SZ6.5 LF STRL

## (undated) DEVICE — 450 ML BOTTLE OF 0.05% CHLORHEXIDINE GLUCONATE IN 99.95% STERILE WATER FOR IRRIGATION, USP AND APPLICATOR.: Brand: IRRISEPT ANTIMICROBIAL WOUND LAVAGE

## (undated) DEVICE — SUT SILK 3/0 TIES 18IN A184H

## (undated) DEVICE — ELECTRD BLD STD SS 3/32X6.5IN

## (undated) DEVICE — INTENDED FOR TISSUE SEPARATION, AND OTHER PROCEDURES THAT REQUIRE A SHARP SURGICAL BLADE TO PUNCTURE OR CUT.: Brand: BARD-PARKER ® CARBON RIB-BACK BLADES

## (undated) DEVICE — DEV OPN LIGASURE CRV 180D 36MM 13.5CM  1P/U

## (undated) DEVICE — SUT SILK 2/0 TIES 18IN A185H

## (undated) DEVICE — KT CANSTR VAC WND W/ISOLYSER SENSATRAC 500CC 10CS

## (undated) DEVICE — BARRIER, ABSORBABLE, ADHESION: Brand: SEPRAFILM®

## (undated) DEVICE — STERILE POLYISOPRENE POWDER-FREE SURGICAL GLOVES WITH EMOLLIENT COATING: Brand: PROTEXIS

## (undated) DEVICE — GOWN,REINFORCE,POLY,SIRUS,BREATH SLV,XLG: Brand: MEDLINE

## (undated) DEVICE — PENCL E/S SMOKEEVAC W/TELESCP CANN

## (undated) DEVICE — SUT MNCRYL PLS ANTIB UD 4/0 PS2 18IN

## (undated) DEVICE — SUT MNCRYL 4/0 PS2 18 IN